# Patient Record
Sex: FEMALE | Race: WHITE | NOT HISPANIC OR LATINO | Employment: FULL TIME | URBAN - METROPOLITAN AREA
[De-identification: names, ages, dates, MRNs, and addresses within clinical notes are randomized per-mention and may not be internally consistent; named-entity substitution may affect disease eponyms.]

---

## 2024-10-24 ENCOUNTER — HOSPITAL ENCOUNTER (EMERGENCY)
Facility: HOSPITAL | Age: 24
Discharge: HOME | End: 2024-10-24
Attending: EMERGENCY MEDICINE
Payer: COMMERCIAL

## 2024-10-24 ENCOUNTER — APPOINTMENT (OUTPATIENT)
Dept: CARDIOLOGY | Facility: HOSPITAL | Age: 24
End: 2024-10-24
Payer: COMMERCIAL

## 2024-10-24 ENCOUNTER — APPOINTMENT (OUTPATIENT)
Dept: RADIOLOGY | Facility: HOSPITAL | Age: 24
End: 2024-10-24
Payer: COMMERCIAL

## 2024-10-24 VITALS
HEART RATE: 95 BPM | RESPIRATION RATE: 21 BRPM | DIASTOLIC BLOOD PRESSURE: 84 MMHG | BODY MASS INDEX: 20.81 KG/M2 | SYSTOLIC BLOOD PRESSURE: 113 MMHG | HEIGHT: 60 IN | OXYGEN SATURATION: 95 % | TEMPERATURE: 97.3 F | WEIGHT: 106 LBS

## 2024-10-24 DIAGNOSIS — R07.9 CHEST PAIN, UNSPECIFIED TYPE: Primary | ICD-10-CM

## 2024-10-24 LAB
ALBUMIN SERPL BCP-MCNC: 4.5 G/DL (ref 3.4–5)
ALP SERPL-CCNC: 42 U/L (ref 33–110)
ALT SERPL W P-5'-P-CCNC: 8 U/L (ref 7–45)
ANION GAP SERPL CALC-SCNC: 16 MMOL/L
AST SERPL W P-5'-P-CCNC: 20 U/L (ref 9–39)
ATRIAL RATE: 102 BPM
BASOPHILS # BLD AUTO: 0.04 X10*3/UL (ref 0–0.1)
BASOPHILS NFR BLD AUTO: 0.5 %
BILIRUB SERPL-MCNC: 0.6 MG/DL (ref 0–1.2)
BUN SERPL-MCNC: 10 MG/DL (ref 6–23)
CALCIUM SERPL-MCNC: 8.9 MG/DL (ref 8.6–10.3)
CARDIAC TROPONIN I PNL SERPL HS: <3 NG/L (ref 0–13)
CARDIAC TROPONIN I PNL SERPL HS: <3 NG/L (ref 0–13)
CHLORIDE SERPL-SCNC: 100 MMOL/L (ref 98–107)
CO2 SERPL-SCNC: 23 MMOL/L (ref 21–32)
CREAT SERPL-MCNC: 0.59 MG/DL (ref 0.5–1.05)
D DIMER PPP FEU-MCNC: <215 NG/ML FEU
EGFRCR SERPLBLD CKD-EPI 2021: >90 ML/MIN/1.73M*2
EOSINOPHIL # BLD AUTO: 0.04 X10*3/UL (ref 0–0.7)
EOSINOPHIL NFR BLD AUTO: 0.5 %
ERYTHROCYTE [DISTWIDTH] IN BLOOD BY AUTOMATED COUNT: 11.8 % (ref 11.5–14.5)
GLUCOSE SERPL-MCNC: 74 MG/DL (ref 74–99)
HCT VFR BLD AUTO: 39.3 % (ref 36–46)
HGB BLD-MCNC: 13.1 G/DL (ref 12–16)
IMM GRANULOCYTES # BLD AUTO: 0.04 X10*3/UL (ref 0–0.7)
IMM GRANULOCYTES NFR BLD AUTO: 0.5 % (ref 0–0.9)
LYMPHOCYTES # BLD AUTO: 2.14 X10*3/UL (ref 1.2–4.8)
LYMPHOCYTES NFR BLD AUTO: 24.3 %
MCH RBC QN AUTO: 29.7 PG (ref 26–34)
MCHC RBC AUTO-ENTMCNC: 33.3 G/DL (ref 32–36)
MCV RBC AUTO: 89 FL (ref 80–100)
MONOCYTES # BLD AUTO: 0.36 X10*3/UL (ref 0.1–1)
MONOCYTES NFR BLD AUTO: 4.1 %
NEUTROPHILS # BLD AUTO: 6.19 X10*3/UL (ref 1.2–7.7)
NEUTROPHILS NFR BLD AUTO: 70.1 %
NRBC BLD-RTO: 0 /100 WBCS (ref 0–0)
P AXIS: 75 DEGREES
P OFFSET: 206 MS
P ONSET: 159 MS
PLATELET # BLD AUTO: 362 X10*3/UL (ref 150–450)
POTASSIUM SERPL-SCNC: 3.7 MMOL/L (ref 3.5–5.3)
PR INTERVAL: 124 MS
PROT SERPL-MCNC: 7.7 G/DL (ref 6.4–8.2)
Q ONSET: 221 MS
QRS COUNT: 16 BEATS
QRS DURATION: 76 MS
QT INTERVAL: 338 MS
QTC CALCULATION(BAZETT): 440 MS
QTC FREDERICIA: 403 MS
R AXIS: 88 DEGREES
RBC # BLD AUTO: 4.41 X10*6/UL (ref 4–5.2)
SODIUM SERPL-SCNC: 135 MMOL/L (ref 136–145)
T AXIS: 18 DEGREES
T OFFSET: 390 MS
VENTRICULAR RATE: 102 BPM
WBC # BLD AUTO: 8.8 X10*3/UL (ref 4.4–11.3)

## 2024-10-24 PROCEDURE — 36415 COLL VENOUS BLD VENIPUNCTURE: CPT

## 2024-10-24 PROCEDURE — 80053 COMPREHEN METABOLIC PANEL: CPT

## 2024-10-24 PROCEDURE — 85379 FIBRIN DEGRADATION QUANT: CPT

## 2024-10-24 PROCEDURE — 93005 ELECTROCARDIOGRAM TRACING: CPT

## 2024-10-24 PROCEDURE — 84484 ASSAY OF TROPONIN QUANT: CPT

## 2024-10-24 PROCEDURE — 99283 EMERGENCY DEPT VISIT LOW MDM: CPT | Mod: 25

## 2024-10-24 PROCEDURE — 71046 X-RAY EXAM CHEST 2 VIEWS: CPT

## 2024-10-24 PROCEDURE — 71046 X-RAY EXAM CHEST 2 VIEWS: CPT | Performed by: RADIOLOGY

## 2024-10-24 PROCEDURE — 85025 COMPLETE CBC W/AUTO DIFF WBC: CPT

## 2024-10-24 ASSESSMENT — COLUMBIA-SUICIDE SEVERITY RATING SCALE - C-SSRS
2. HAVE YOU ACTUALLY HAD ANY THOUGHTS OF KILLING YOURSELF?: NO
1. IN THE PAST MONTH, HAVE YOU WISHED YOU WERE DEAD OR WISHED YOU COULD GO TO SLEEP AND NOT WAKE UP?: NO
6. HAVE YOU EVER DONE ANYTHING, STARTED TO DO ANYTHING, OR PREPARED TO DO ANYTHING TO END YOUR LIFE?: NO

## 2024-10-24 ASSESSMENT — PAIN DESCRIPTION - DESCRIPTORS: DESCRIPTORS: HEAVINESS

## 2024-10-24 ASSESSMENT — PAIN - FUNCTIONAL ASSESSMENT: PAIN_FUNCTIONAL_ASSESSMENT: 0-10

## 2024-10-24 ASSESSMENT — PAIN SCALES - GENERAL: PAINLEVEL_OUTOF10: 7

## 2024-10-24 NOTE — Clinical Note
Essence Almodovar was seen and treated in our emergency department on 10/24/2024.  She may return to work on 10/25/2024.       If you have any questions or concerns, please don't hesitate to call.      Tony Carroll, DO

## 2024-10-24 NOTE — DISCHARGE INSTRUCTIONS
You have been diagnosed with chest pain in the emergency department today.  Your labs and imaging did not show any concerning findings today and you will be discharged home.  You can take Tylenol and ibuprofen as needed for your pain.  A referral for primary care has been placed and you can expect a call from them in the next few days to set up an appointment.  Please return to the emergency department if you begin experiencing any severe crushing chest pain, shortness of breath on exertion, passout, severe/sudden onset headache, or strokelike symptoms.

## 2024-10-24 NOTE — ED PROVIDER NOTES
History of Present Illness   History provided by: Patient  Limitations to History: None  External Records Reviewed with Brief Summary: Outpatient progress note from 10/23/2024 which showed workup for palpitations and near syncopal episode with nausea where she was prescribed Zofran.    HPI:  Essence Almodovar is a 24 y.o. female with with a past medical history of SLE that presents emerged apartment for chest pain.  The patient states that this pain is located in the center of her chest and does not radiate anywhere else.  She describes the pain as pressure in nature with occasional bouts of sharpness but denies any ripping/tearing pain.  She does state that the pain is worse when she stands for prolonged periods of time but is not worse with exertion.  She does complain of some mild nausea with no vomiting.  2 days ago she did have an episode where she became flushed and had some associated dizziness and near syncope but the symptoms have completely resolved and have not recurred.  She does complain of some mild neck pain that is worse with changes in position but denies any associated shortness of breath, fevers, chills, cough, hemoptysis, lower extremity pain/swelling, abdominal pain, focal weakness, numbness/tingling, vision changes, loss of bowel or bladder dysfunction or any other concerning symptoms.  The patient is currently on birth control.  She denies any chance of being pregnant as she has not engaged in sexual activity yet.    Physical Exam   Triage vitals:  T 36.3 °C (97.3 °F)  HR (!) 107  /87  RR 16  O2 97 %      Vital signs reviewed in nursing triage note, EMR flow sheets, and at patient's bedside.   General: Awake, alert, in no acute distress  Eyes: Gaze conjugate.  No scleral icterus or injection  HENT: Normo-cephalic, atraumatic. No stridor. No rhinorrhea or epistaxis.  CV: Tachycardic with regular rhythm. No murmurs appreciated.  2+ radial and DP pulses bilaterally.  Respiratory: Breathing  non-labored, speaking in full sentences.  Lungs clear to auscultation bilaterally.  GI: Soft, non-distended, non-tender. No rebound or guarding.  MSK/Extremities: No gross bony deformities. Moving all extremities. No lower extremity edema.  Skin: Warm. Appropriate color  Neuro: Alert. Oriented. Face symmetric. Speech is fluent.  Gross strength and sensation intact in b/l UE and LEs  Psych: Appropriate mood and affect      Medical Decision Making & ED Course   Medical Decision Makin y.o. female with the above-stated past medical history that presents to the emergency department for chest pain.  Upon arrival to the emergency department the patient was tachycardic with a heart rate of 107 but had otherwise stable vital signs, was afebrile and saturating well on room air.  History and physical exam are as above but notable for nontoxic-appearing patient in no acute distress.  Exam otherwise is grossly unremarkable.  Patient had normal and equal pulses in all 4 extremities and a nonfocal neurologic exam and signs and symptoms are not consistent with aortic dissection as this is extremely unlikely in a patient this age without significant comorbidities.  There is concern for pulmonary embolism as the patient is on birth control and does have a history of lupus and will obtain D-dimer to rule out this pathology.  Appropriate chest pain workup has been ordered including EKG, chest x-ray and troponins x 2.  EKG was without evidence of ischemia.  Chest x-ray was grossly unremarkable.  Point-of-care ultrasound was personally performed and interpreted by me showing no evidence of pericardial effusion, depressed cardiac function, right heart strain or other concerning findings.  Given the overall negative workup, the patient is appropriate for discharge home.  See ED course below for further discharge details.    Going to use: Heart score 1    Differential diagnoses considered include but are not limited to: ACS, PE,  pneumonia, pericardial effusion, pneumothorax, reflux disease, anxiety, costochondritis, aortic dissection    ED Course:  ED Course as of 10/24/24 2203   Thu Oct 24, 2024   1035 EKG personally interpreted by me showing sinus tachycardia at a rate of 102 bpm with normal axis.  Intervals are within normal limits.  There is mild ST depression appreciated in V5 with no contiguous changes.  T wave inversions noted in the anterior and inferior leads.  There is no prior EKGs for comparison.  No GARCÍA. [RS]   1248 D-Dimer, VTE Exclusion  D-dimer negative.  Repeat troponin pending at this time. [RS]   1319 Troponin I Series, High Sensitivity (0, 1 HR)  Repeat troponin negative.  The patient is appropriate for discharge at this time and was provided with a prescription for Tylenol and ibuprofen which was sent to their pharmacy.  Strict return precautions were provided including  severe/crushing chest pain, shortness of breath on exertion, syncope, severe/sudden onset headaches or strokelike symptoms.  The patient verbalized their understanding of this and was discharged in stable condition.  Instructions to follow-up with their primary care provider within 5 days for reevaluation of her symptoms were provided. [RS]      ED Course User Index  [RS] Tony Carroll,          Diagnoses as of 10/24/24 2203   Chest pain, unspecified type        Social Determinants of Health which Significantly Impact Care: None identified     EKG Independent Interpretation: EKG interpreted by myself. Please see ED Course for full interpretation.    Independent Result Review and Interpretation: Relevant laboratory and radiographic results were reviewed and independently interpreted by myself.  As necessary, they are commented on in the ED Course.    Chronic conditions affecting the patient's care: As documented in the HPI    The patient was discussed with the following consultants/services: None    Care Considerations: As documented above in  MDM    Disposition   As a result of the work-up, the patient was discharged home.  she was informed of her diagnosis and instructed to come back with any concerns or worsening of condition.  she and was agreeable to the plan as discussed above.  she was given the opportunity to ask questions.  All of the patient's questions were answered.    Procedures   Procedures    Patient seen and discussed with ED attending physician.    Tony Carroll DO   Emergency Medicine, PGY-2     Disclaimer: This note was dictated by speech recognition. Minor errors in transcription may be present.     [unfilled] ? SmartLinks last updated 10/24/2024 10:41 AM        Tony Carroll DO  Resident  10/24/24 [id number]

## 2024-10-25 NOTE — ED PROCEDURE NOTE
Procedure    Performed by: Tony Carroll DO  Authorized by: Derian Newton MD  Cardiac Indications: chest pain                Procedure: Cardiac Ultrasound    Findings:   Views: parasternal long, parasternal short and apical four  The pericardial space was visualized and was NEGATIVE for a significant pericardial effusion.  Activity: Ventricular contractions were visualized.  LV: LV systolic function was NORMAL.  RV: RV size was NORMAL.    Impression:  Cardiac: The focused cardiac ultrasound exam was NORMAL.                   Tony Carroll DO  Resident  10/24/24 7559

## 2024-10-31 ENCOUNTER — APPOINTMENT (OUTPATIENT)
Dept: PRIMARY CARE | Facility: CLINIC | Age: 24
End: 2024-10-31
Payer: COMMERCIAL

## 2024-10-31 VITALS
BODY MASS INDEX: 20.81 KG/M2 | DIASTOLIC BLOOD PRESSURE: 72 MMHG | HEART RATE: 82 BPM | SYSTOLIC BLOOD PRESSURE: 124 MMHG | RESPIRATION RATE: 17 BRPM | HEIGHT: 60 IN | WEIGHT: 106 LBS

## 2024-10-31 DIAGNOSIS — R07.9 CHEST PAIN, UNSPECIFIED TYPE: Primary | ICD-10-CM

## 2024-10-31 DIAGNOSIS — M32.9 SYSTEMIC LUPUS ERYTHEMATOSUS, UNSPECIFIED SLE TYPE, UNSPECIFIED ORGAN INVOLVEMENT STATUS (MULTI): ICD-10-CM

## 2024-10-31 DIAGNOSIS — R94.31 ABNORMAL EKG: ICD-10-CM

## 2024-10-31 PROBLEM — I49.9 IRREGULAR HEART BEAT: Status: RESOLVED | Noted: 2020-09-02 | Resolved: 2024-10-31

## 2024-10-31 PROBLEM — F90.9 ADHD: Status: RESOLVED | Noted: 2020-09-02 | Resolved: 2024-10-31

## 2024-10-31 LAB
ATRIAL RATE: 102 BPM
P AXIS: 75 DEGREES
P OFFSET: 206 MS
P ONSET: 159 MS
PR INTERVAL: 124 MS
Q ONSET: 221 MS
QRS COUNT: 16 BEATS
QRS DURATION: 76 MS
QT INTERVAL: 338 MS
QTC CALCULATION(BAZETT): 440 MS
QTC FREDERICIA: 403 MS
R AXIS: 88 DEGREES
T AXIS: 18 DEGREES
T OFFSET: 390 MS
VENTRICULAR RATE: 102 BPM

## 2024-10-31 PROCEDURE — 99204 OFFICE O/P NEW MOD 45 MIN: CPT | Performed by: NURSE PRACTITIONER

## 2024-10-31 PROCEDURE — 3008F BODY MASS INDEX DOCD: CPT | Performed by: NURSE PRACTITIONER

## 2024-10-31 PROCEDURE — 1036F TOBACCO NON-USER: CPT | Performed by: NURSE PRACTITIONER

## 2024-10-31 RX ORDER — ONDANSETRON 4 MG/1
4 TABLET, ORALLY DISINTEGRATING ORAL EVERY 8 HOURS PRN
COMMUNITY
Start: 2024-10-23

## 2024-10-31 RX ORDER — NORELGESTROMIN AND ETHINYL ESTRADIOL 35; 150 UG/D; UG/D
1 PATCH TRANSDERMAL
COMMUNITY
Start: 2024-10-26

## 2024-10-31 RX ORDER — HYDROXYCHLOROQUINE SULFATE 200 MG/1
1 TABLET, FILM COATED ORAL DAILY
COMMUNITY

## 2024-10-31 ASSESSMENT — ENCOUNTER SYMPTOMS
TREMORS: 1
FATIGUE: 1
WHEEZING: 0
FEVER: 0
PALPITATIONS: 1
COUGH: 0
NAUSEA: 1
VOMITING: 0
NERVOUS/ANXIOUS: 0
CHILLS: 0
SHORTNESS OF BREATH: 0

## 2024-11-01 ENCOUNTER — TELEPHONE (OUTPATIENT)
Dept: CARDIOLOGY | Facility: CLINIC | Age: 24
End: 2024-11-01
Payer: COMMERCIAL

## 2024-11-01 NOTE — TELEPHONE ENCOUNTER
Rec'v a voice message from Janelle Fry CNP's office requesting a New Patient Visit for Chest Pain.  They requested we call the patient directly.    I called Essence, had to leave a detailed vm.  I asked that Essence call us back to schedule.    We will try calling her again later.  ---ssd.

## 2024-11-12 ENCOUNTER — HOSPITAL ENCOUNTER (EMERGENCY)
Facility: HOSPITAL | Age: 24
Discharge: LEFT BEFORE DISCHARGE | End: 2024-11-12
Attending: INTERNAL MEDICINE
Payer: COMMERCIAL

## 2024-11-12 VITALS
SYSTOLIC BLOOD PRESSURE: 114 MMHG | WEIGHT: 106 LBS | TEMPERATURE: 99.1 F | HEART RATE: 87 BPM | BODY MASS INDEX: 20.81 KG/M2 | OXYGEN SATURATION: 98 % | DIASTOLIC BLOOD PRESSURE: 87 MMHG | HEIGHT: 60 IN | RESPIRATION RATE: 18 BRPM

## 2024-11-12 DIAGNOSIS — R00.2 PALPITATIONS: Primary | ICD-10-CM

## 2024-11-12 DIAGNOSIS — R07.9 CHEST PAIN, UNSPECIFIED TYPE: ICD-10-CM

## 2024-11-12 DIAGNOSIS — R42 DIZZINESS: ICD-10-CM

## 2024-11-12 PROCEDURE — 99281 EMR DPT VST MAYX REQ PHY/QHP: CPT

## 2024-11-12 ASSESSMENT — COLUMBIA-SUICIDE SEVERITY RATING SCALE - C-SSRS
2. HAVE YOU ACTUALLY HAD ANY THOUGHTS OF KILLING YOURSELF?: NO
6. HAVE YOU EVER DONE ANYTHING, STARTED TO DO ANYTHING, OR PREPARED TO DO ANYTHING TO END YOUR LIFE?: NO
1. IN THE PAST MONTH, HAVE YOU WISHED YOU WERE DEAD OR WISHED YOU COULD GO TO SLEEP AND NOT WAKE UP?: NO

## 2024-11-12 ASSESSMENT — PAIN SCALES - GENERAL: PAINLEVEL_OUTOF10: 0 - NO PAIN

## 2024-11-12 ASSESSMENT — PAIN - FUNCTIONAL ASSESSMENT: PAIN_FUNCTIONAL_ASSESSMENT: 0-10

## 2024-11-12 NOTE — ED PROVIDER NOTES
"HPI     CC: Dizziness and Nausea     HPI: Essence Almodovar is a 24 y.o. female with a history of SLE on Plaquenil who presents with 2 weeks of intermittent chest pain, dizziness, and nausea.  She has been having intermittent spells of dizziness, palpitations, substernal chest pain, shakiness, and nausea.  It happened suddenly, without provocation, lasts up to 30 minutes at a time.  She describes the dizziness as both a lightheaded and \"room rocking\" sensation.  She intermittently has some shortness of breath associated with this.  A few days ago at work she lost consciousness briefly.  She denies leg pain or swelling.  She is on birth control.  She was seen for similar symptoms in the ED 2 weeks ago, had a workup that included labs, ECG, point-of-care echo and D-dimer which were reportedly negative.  Her p.o. intake has been good.  She is not on any new medications.  She states that she has a history of low blood pressure but her blood pressure has been running high during his episodes.    ROS: 10-point review of systems was performed and is otherwise negative except as noted in HPI.    Limitations to history: N/A    Independent Historians: N/A    External Records Reviewed: Outpatient notes in EMR    Past Medical History: Noncontributory except per HPI     Past Surgical History: Noncontributory except per HPI     Family History: Reviewed and noncontributory     Social History:  Denies tobacco. Denies ETOH. Denies illicit drugs.    Social Determinants Affecting Care: N/A    Allergies   Allergen Reactions    Pineapple Anaphylaxis, Unknown and Swelling    Amoxicillin Hives       Home Meds:   Current Outpatient Medications   Medication Instructions    hydroxychloroquine (Plaquenil) 200 mg tablet 1 tablet, Daily    ondansetron ODT (ZOFRAN-ODT) 4 mg, Every 8 hours PRN    Zafemy 150-35 mcg/24 hr 1 patch        Physical Exam     ED Triage Vitals [11/12/24 1200]   Temperature Heart Rate Respirations BP   37.3 °C (99.1 °F) 88 " 18 115/79      Pulse Ox Temp Source Heart Rate Source Patient Position   100 % Temporal Monitor Sitting      BP Location FiO2 (%)     Left arm --         Heart Rate:  [88]   Temperature:  [37.3 °C (99.1 °F)]   Respirations:  [18]   BP: (115)/(79)   Height:  [152.4 cm (5')]   Weight:  [48.1 kg (106 lb)]   Pulse Ox:  [100 %]      Physical Exam  Vitals and nursing note reviewed.     CONSTITUTIONAL: Well appearing, well nourished, in no acute distress.   HENMT: Head atraumatic. Airway patent. Nasal mucosa clear. Mouth with normal mucosa, clear oropharynx. Uvula midline. Neck supple.    EYES: Clear bilaterally, pupils equally round and reactive to light.   CARDIOVASCULAR: Normal rate, regular rhythm.  Heart sounds S1, S2.  No murmurs, rubs or gallops. Normal pulses. Capillary refill < 2 sec.   RESPIRATORY: No increased work of breathing. Breath sounds clear and equal bilaterally.  GASTROINTESTINAL: Abdomen soft, non-distended, non-tender. No rebound, no guarding. Normal bowel sounds. No palpable masses.  GENITOURINARY:  No CVA tenderness.  MUSCULOSKELETAL: Spine appears normal, range of motion is not limited, no muscle or joint tenderness. No edema.   NEUROLOGICAL: Alert and oriented, no asymmetry, moving all extremities equally.  SKIN: Warm, dry and intact. No rash or notable lesions.  PSYCHIATRIC: Normal mood and affect.  HEME/LYMPH: No adenopathy or splenomegaly.    Diagnostic Results      ECG: ECGs read and interpreted by me. See ED Course, below, for interpretation.    Labs Reviewed - No data to display      No orders to display                 No data recorded                Procedure  Procedures    ED Course & MDM   Assessment/Plan:   Essence Almodovar is a 24 y.o. female with a history of SLE on Plaquenil who presents with 2 weeks of intermittent isolated episodes of chest pain, dizziness, and nausea over the past 2 weeks with some associated shortness of breath and a syncopal event a few days ago.  She is  hemodynamically stable and well-appearing with an unremarkable exam.  ECG shows nonspecific T wave changes unchanged from prior.  Differential is broad, includes arrhythmia, orthostasis, vasovagal syncope, PE, less likely ACS.  I recommended repeating labs including a D-dimer and serial troponins as well as an x-ray.  She is not interested in repeating the workup from 2 weeks ago and actually wants to leave AGAINST MEDICAL ADVICE and return to work.  Patient was discharged home with anticipatory guidance and strict return precautions.    Medications - No data to display     ED Course as of 11/13/24 2037 Tue Nov 12, 2024   1345 ECG read interpreted by me.  Normal sinus rhythm, rate 96.  Rightward axis.  Normal intervals.  Isolated T wave inversion V3 and V4.  Unchanged from prior. [CG]      ED Course User Index  [CG] Marina Mendoza MD         Diagnoses as of 11/13/24 2037   Palpitations   Chest pain, unspecified type   Dizziness       Disposition:   Left against medical advice.. In my opinion, the patient has capacity to leave AMA. The patient is clinically sober, free from distracting injury, appears to have intact insight and judgment and reason, and in my opinion has capacity to make decisions. I explained to the patient that these symptoms may represent a serious underlying medical condition  and the patient verbalized understanding of my concerns and understands the consequences of leaving without complete evaluation.    I had a discussion with the patient about their workup and results, and informed the patient what the next step in diagnosis and treatment would be, and they verbalized understanding. I explained the risks of leaving without further workup or treatment, which included reasonably foreseeable complications such as death, serious injury, and permanent disability. I also offered alternatives to departing AMA such as assigning the patient a different provider or an alternate workup  pathway.    I have asked them to return as soon as possible to complete their evaluation, and also explained that they are welcome to return to the ED for further evaluation whenever they choose. I have asked the patient to follow up with their primary doctor as soon as possible. All of their questions were answered and they were given both oral and written discharge instructions.     ED Prescriptions    None         Marina Mendoza MD  EM/IM/Peds    This note was dictated by speech recognition. Minor errors in transcription may be present.     Marina Mendoza MD  11/13/24 2041

## 2024-11-12 NOTE — DISCHARGE INSTRUCTIONS
You were seen today for episodic dizziness, nausea, and palpitations.  You have opted to leave AGAINST MEDICAL ADVICE.  Keep your appointment with cardiology next week.  You may benefit from a Holter monitor.  Please see the attached information sheet for information about your condition, how to care for your condition at home, and reasons to return to the emergency department. Take any prescriptions written today as prescribed. You should call your primary care provider within 24 hours to tell them about today's visit, including any new medications or medication changes, as he or she may want to see you in the office for further evaluation. If you do not have a primary care provider, call  [phone] for an appointment. We offer in-person office visits as well as virtual options. Please do not hesitate to call  [phone] or return to the emergency department with any new or unresolved concerns or symptoms. Thank you for choosing [facility] for your care.

## 2024-11-18 ENCOUNTER — APPOINTMENT (OUTPATIENT)
Dept: CARDIOLOGY | Facility: CLINIC | Age: 24
End: 2024-11-18
Payer: COMMERCIAL

## 2024-11-18 VITALS
HEART RATE: 82 BPM | HEIGHT: 60 IN | DIASTOLIC BLOOD PRESSURE: 68 MMHG | BODY MASS INDEX: 21.2 KG/M2 | SYSTOLIC BLOOD PRESSURE: 110 MMHG | WEIGHT: 108 LBS

## 2024-11-18 DIAGNOSIS — R07.89 ATYPICAL CHEST PAIN: ICD-10-CM

## 2024-11-18 DIAGNOSIS — M32.19 OTHER SYSTEMIC LUPUS ERYTHEMATOSUS WITH OTHER ORGAN INVOLVEMENT: ICD-10-CM

## 2024-11-18 DIAGNOSIS — R55 SYNCOPE AND COLLAPSE: Primary | ICD-10-CM

## 2024-11-18 DIAGNOSIS — R07.9 CHEST PAIN, UNSPECIFIED TYPE: ICD-10-CM

## 2024-11-18 DIAGNOSIS — R00.2 PALPITATIONS: ICD-10-CM

## 2024-11-18 PROCEDURE — 1036F TOBACCO NON-USER: CPT | Performed by: INTERNAL MEDICINE

## 2024-11-18 PROCEDURE — 3008F BODY MASS INDEX DOCD: CPT | Performed by: INTERNAL MEDICINE

## 2024-11-18 PROCEDURE — 99204 OFFICE O/P NEW MOD 45 MIN: CPT | Performed by: INTERNAL MEDICINE

## 2024-11-18 PROCEDURE — 93000 ELECTROCARDIOGRAM COMPLETE: CPT | Performed by: INTERNAL MEDICINE

## 2024-11-18 RX ORDER — DIPHENHYDRAMINE HCL 25 MG
25 TABLET ORAL DAILY PRN
COMMUNITY

## 2024-11-18 RX ORDER — PSEUDOEPHEDRINE HCL 120 MG/1
120 TABLET, FILM COATED, EXTENDED RELEASE ORAL EVERY 12 HOURS PRN
COMMUNITY

## 2024-11-18 NOTE — PROGRESS NOTES
Referred by Dr. Wang ref. provider found for Please see below.     History Of Present Illness:    Essence Almodovar is a 24 y.o. 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  In the ER she had a variety of labs including D-dimer and troponins, and CXR, and EKGs.  She was released.  During the November 12, 2024 ER visit, she left AGAINST MEDICAL ADVICE.    The aforementioned symptoms occurred randomly at work.  On one occasion, the chest discomfort, palpitations, dizziness, and feeling hot occurred after she ate.  On the second occasion, she was taking to a patient.  She also had a feeling like an elephant sitting on her chest, with duration being about 30 minutes.  The chest discomfort was not pleuritic in nature.    She runs or does an aerobic dance activity for 30 minutes a day, five days a week.  Usually she feels well, but sometimes gets dizzy and nauseated during exercise as well.      She drinks about 64 oz of water per day.  She rarely consumes caffeine or alcohol.  She does not use recreational drugs.    She is taking Allegra and pseudoephedrine over-the-counter for allergy symptoms.      PMH: lupus (malar rash, fatigue, arthralgias), seasonal allergies (on Allegra, and pseudoephedrine)      Past Medical History:  She has a past medical history of ADHD (09/02/2020), ADHD (attention deficit hyperactivity disorder), Allergic, Eating disorder, Eczema, Headache, Irregular heart beat (09/02/2020), Scoliosis, and Visual impairment.    Past Surgical History:  She has a past surgical history that includes [place] tooth extraction.      Social History:  She reports that she has never smoked. She has never used smokeless tobacco. She reports current alcohol use.  "She reports that she does not use drugs.    Family History:  Family History   Problem Relation Name Age of Onset    No Known Problems Mother      No Known Problems Father          Allergies:  Pineapple and Amoxicillin    Outpatient Medications:  Current Outpatient Medications   Medication Instructions    diphenhydrAMINE (ALLERGY) 25 mg, Daily PRN    hydroxychloroquine (Plaquenil) 200 mg tablet 1 tablet, Daily    ondansetron ODT (ZOFRAN-ODT) 4 mg, Every 8 hours PRN    pseudoephedrine ER (SUDAFED-12 HOUR) 120 mg, Every 12 hours PRN    Zafemy 150-35 mcg/24 hr 1 patch        Last Recorded Vitals:  Vitals:    11/18/24 1300   BP: 110/68   BP Location: Left arm   Patient Position: Sitting   Pulse: 82   Weight: 49 kg (108 lb)   Height: 1.524 m (5')       Physical Exam:  GENERAL:  pleasant 24 year-old  HEENT: No xanthelasma  NECK: Supple, no palpable adenopathy or thyromegaly  CHEST: Clear to auscultation, respiratory effort unlabored; her chest discomfort is reproducible with palpation in the midsternal region.  CARDIAC: RRR, normal S1 and S2, no audible murmur, rub, gallop, carotids are brisk, PMI is not displaced  ABD: Active bowel sounds, nontender, no organomegaly, no evidence of ascites  EXT: No clubbing, cyanosis, edema, or tenderness  NEURO: Awake, alert, appropriate, speech is fluent         Last Labs:  CBC -  Lab Results   Component Value Date    WBC 8.8 10/24/2024    HGB 13.1 10/24/2024    HCT 39.3 10/24/2024    MCV 89 10/24/2024     10/24/2024       CMP -  Lab Results   Component Value Date    CALCIUM 8.9 10/24/2024    PROT 7.7 10/24/2024    ALBUMIN 4.5 10/24/2024    AST 20 10/24/2024    ALT 8 10/24/2024    ALKPHOS 42 10/24/2024    BILITOT 0.6 10/24/2024       LIPID PANEL -   No results found for: \"CHOL\", \"TRIG\", \"HDL\", \"CHHDL\", \"LDLF\", \"VLDL\", \"NHDL\"    RENAL FUNCTION PANEL -   Lab Results   Component Value Date    GLUCOSE 74 10/24/2024     (L) 10/24/2024    K 3.7 10/24/2024     10/24/2024    " "CO2 23 10/24/2024    ANIONGAP 16 10/24/2024    BUN 10 10/24/2024    CREATININE 0.59 10/24/2024    CALCIUM 8.9 10/24/2024    ALBUMIN 4.5 10/24/2024        No results found for: \"BNP\", \"HGBA1C\"      Lab review: I have Chemistry CMP:   Lab Results   Component Value Date    ALBUMIN 4.5 10/24/2024    CALCIUM 8.9 10/24/2024    CO2 23 10/24/2024    CREATININE 0.59 10/24/2024    GLUCOSE 74 10/24/2024    BILITOT 0.6 10/24/2024    PROT 7.7 10/24/2024    ALT 8 10/24/2024    AST 20 10/24/2024    ALKPHOS 42 10/24/2024   , Chemistry BMP   Lab Results   Component Value Date    GLUCOSE 74 10/24/2024    CALCIUM 8.9 10/24/2024    CO2 23 10/24/2024    CREATININE 0.59 10/24/2024   , CBC:  Lab Results   Component Value Date    WBC 8.8 10/24/2024    RBC 4.41 10/24/2024    HGB 13.1 10/24/2024    HCT 39.3 10/24/2024    MCV 89 10/24/2024    MCH 29.7 10/24/2024    MCHC 33.3 10/24/2024    RDW 11.8 10/24/2024    NRBC 0.0 10/24/2024   , and Cardiac Enzymes: No results found for: \"CKTOTAL\", \"CKMB\", \"CKMBINDEX\", \"TROPONINT\"  Diagnostic review: I have independently interpreted the EKG .  My findings are as summarized in the reports.    Assessment/Plan   Assessment & Plan  Chest pain, unspecified type  Symptoms of chest pain sound atypical, and are reproducible on exam.  Because of her history of lupus, the issue of pericardial disease is raised.  An echocardiogram will help us address this.  Orders:    ECG 12 lead (Clinic Performed)    Syncope and collapse  The episodes of syncope sound somewhat vasovagal in origin, or may be related to POTS.  I advised her to increase her intake of noncaffeinated, nonalcoholic beverages to at least 100-120 ounces daily.      I advised the patient that  the following should be avoided: driving, operating power equipment, climbing ladders, and situations where if the patient were to pass out, there could be potential harm to the patient or bystanders.     I advised the patient to contact me should the patient " develop persistent symptoms after making these lifestyle modifications.  The patient understands and agrees.    Orders:    Holter Or Event Cardiac Monitor; Future    Follow Up In Cardiology; Future    Atypical chest pain  As above, echocardiogram will be done to assess for possible pericardial effusion in light of her history of lupus.  Orders:    Transthoracic Echo Complete; Future    Follow Up In Cardiology; Future    Other systemic lupus erythematosus with other organ involvement    Orders:    Transthoracic Echo Complete; Future    Palpitations  Advised her to stop using pseudoephedrine and Allegra and other antihistamines.               Israel Silva MD

## 2024-11-18 NOTE — PATIENT INSTRUCTIONS
"It was my pleasure to meet you.  I look forward to being your cardiologist.  I am a huge believer in communicating with my patients.  Please contact me at any time, if anything is not clear to you regarding anything we have discussed, or if new questions occur to you.     You should increase your intake of fresh fruits and vegetables.  Try to consume 9-12 servings per day of such foods.  You should increase your intake of deep sea fish such as salmon and tuna.  Try to get two servings per week of fish, but if you are a pregnant woman, talk to your obstetrician before increasing your fish intake.  You should increase your intake of unprocessed nuts such as walnuts or almonds.  Increase your intake of plant-based protein.  You should avoid fried foods.  Don't consume sugary or starchy foods and sugary drinks.  Avoid saturated fats.  Try not to dine at restaurants more than once per month, and don't dine at fast food places.  Try to get 7-9 hours of sleep every night.  Try to get 150 minutes per week of moderate intensity exercise (after I have cleared you to start an exercise program).  Try to maintain the appropriate weight for your height based on body mass index (BMI). Maintain your cholesterol, blood sugar, and blood pressure in the recommended respective normal ranges.  There is a wealth of information on the American Heart Association's website regarding this.  Just Google \"Life's Essential 8\" for more information.   Ask me about any of these details  if you have questions.    As your cardiologist, I will be available to you at any time to answer any question you have concerning your heart health.  My staff, Juan can also answer any questions you may have.  Best of luck.       It is important for us to have an accurate list of the medications, supplements, and their doses.  It is also important for us to have an accurate list of your allergies.  Please bring this information to every appointment.  " This is a vital part of the quality of care you receive through all of your providers.

## 2024-11-18 NOTE — ASSESSMENT & PLAN NOTE
As above, echocardiogram will be done to assess for possible pericardial effusion in light of her history of lupus.  Orders:    Transthoracic Echo Complete; Future    Follow Up In Cardiology; Future

## 2024-11-18 NOTE — ASSESSMENT & PLAN NOTE
Symptoms of chest pain sound atypical, and are reproducible on exam.  Because of her history of lupus, the issue of pericardial disease is raised.  An echocardiogram will help us address this.  Orders:    ECG 12 lead (Clinic Performed)

## 2024-11-20 ENCOUNTER — HOSPITAL ENCOUNTER (OUTPATIENT)
Dept: CARDIOLOGY | Facility: HOSPITAL | Age: 24
Discharge: HOME | End: 2024-11-20
Payer: COMMERCIAL

## 2024-11-20 DIAGNOSIS — R55 SYNCOPE AND COLLAPSE: ICD-10-CM

## 2024-11-20 PROCEDURE — 93225 XTRNL ECG REC<48 HRS REC: CPT

## 2024-11-27 ENCOUNTER — HOSPITAL ENCOUNTER (OUTPATIENT)
Dept: CARDIOLOGY | Facility: HOSPITAL | Age: 24
Discharge: HOME | End: 2024-11-27
Payer: COMMERCIAL

## 2024-11-27 DIAGNOSIS — R07.9 CHEST PAIN, UNSPECIFIED: ICD-10-CM

## 2024-11-27 DIAGNOSIS — M32.19 OTHER SYSTEMIC LUPUS ERYTHEMATOSUS WITH OTHER ORGAN INVOLVEMENT: ICD-10-CM

## 2024-11-27 DIAGNOSIS — R07.89 ATYPICAL CHEST PAIN: ICD-10-CM

## 2024-11-27 LAB
AORTIC VALVE MEAN GRADIENT: 3 MMHG
AORTIC VALVE PEAK VELOCITY: 1.33 M/S
AV PEAK GRADIENT: 7 MMHG
AVA (PEAK VEL): 2.2 CM2
AVA (VTI): 2.26 CM2
EJECTION FRACTION APICAL 4 CHAMBER: 64.5
EJECTION FRACTION: 67 %
LEFT ATRIUM VOLUME AREA LENGTH INDEX BSA: 30.8 ML/M2
LEFT VENTRICLE INTERNAL DIMENSION DIASTOLE: 4.62 CM (ref 3.5–6)
LEFT VENTRICULAR OUTFLOW TRACT DIAMETER: 1.81 CM
MITRAL VALVE E/A RATIO: 1.57
RIGHT VENTRICLE FREE WALL PEAK S': 17 CM/S
RIGHT VENTRICLE PEAK SYSTOLIC PRESSURE: 18.6 MMHG
TRICUSPID ANNULAR PLANE SYSTOLIC EXCURSION: 2.3 CM

## 2024-11-27 PROCEDURE — 93306 TTE W/DOPPLER COMPLETE: CPT | Performed by: INTERNAL MEDICINE

## 2024-11-27 PROCEDURE — 93306 TTE W/DOPPLER COMPLETE: CPT

## 2024-12-02 ENCOUNTER — APPOINTMENT (OUTPATIENT)
Dept: CARDIOLOGY | Facility: CLINIC | Age: 24
End: 2024-12-02
Payer: COMMERCIAL

## 2024-12-02 VITALS
DIASTOLIC BLOOD PRESSURE: 60 MMHG | BODY MASS INDEX: 21.4 KG/M2 | HEIGHT: 60 IN | WEIGHT: 109 LBS | SYSTOLIC BLOOD PRESSURE: 100 MMHG | HEART RATE: 76 BPM

## 2024-12-02 DIAGNOSIS — G90.A POTS (POSTURAL ORTHOSTATIC TACHYCARDIA SYNDROME): Primary | ICD-10-CM

## 2024-12-02 DIAGNOSIS — R55 SYNCOPE AND COLLAPSE: ICD-10-CM

## 2024-12-02 DIAGNOSIS — R07.89 ATYPICAL CHEST PAIN: ICD-10-CM

## 2024-12-02 PROCEDURE — 1036F TOBACCO NON-USER: CPT | Performed by: INTERNAL MEDICINE

## 2024-12-02 PROCEDURE — 99215 OFFICE O/P EST HI 40 MIN: CPT | Performed by: INTERNAL MEDICINE

## 2024-12-02 PROCEDURE — 3008F BODY MASS INDEX DOCD: CPT | Performed by: INTERNAL MEDICINE

## 2024-12-02 NOTE — PROGRESS NOTES
"Chief Complaint:   Please see below.     History Of Present Illness:    Essence Almodovar is a 24 y.o. female presenting with palpitations, syncope.    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  In the ER she had a variety of labs including D-dimer and troponins, and CXR, and EKGs.  She was released.  During the November 12, 2024 ER visit, she left AGAINST MEDICAL ADVICE.  The aforementioned symptoms occurred randomly at work.  On one occasion, the chest discomfort, palpitations, dizziness, and feeling hot occurred after she ate.  On the second occasion, she was taking to a patient.  She also had a feeling like an elephant sitting on her chest, with duration being about 30 minutes.  The chest discomfort was not pleuritic in nature.    At her previous visit in November 2024, I ordered an echocardiogram in light of her history of lupus to assess for possible pericardial effusion, and a monitor study.  The patient's echocardiogram dated November 27, 2024 revealed an EF of 67%, without significant valvular or pericardial disease.  There is no evidence of pericardial effusion.  The patient's monitor study done November 20-22 2024 disclosed rare PACs, rare PVCs, no sustained pathologic arrhythmia, heart rate ranged from 55-1 52, with an average heart rate of 86.    She continues to experience symptoms of shortness ofr breath, dizziness and chest pain.  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  She drinks 40-60 oz of water daily.  She rarely consumes alcohol, and does not use recreatrional drugs.      She stopped her antihistamine for 10 days, \"and I was miserable, so I started taking it again\".  She has stopped the pseudoephedrine.          Last Recorded Vitals:  Vitals:    12/02/24 1500 "   BP: 100/60   BP Location: Left arm   Patient Position: Sitting   Pulse: 76   Weight: 49.4 kg (109 lb)   Height: 1.524 m (5')       Past Medical History:  She has a past medical history of ADHD (09/02/2020), ADHD (attention deficit hyperactivity disorder), Allergic, Eating disorder, Eczema, Headache, Irregular heart beat (09/02/2020), Scoliosis, and Visual impairment.    Past Surgical History:  She has a past surgical history that includes [place] tooth extraction.      Social History:  She reports that she has never smoked. She has never used smokeless tobacco. She reports current alcohol use. She reports that she does not use drugs.    Family History:  Family History   Problem Relation Name Age of Onset    No Known Problems Mother      No Known Problems Father          Allergies:  Pineapple and Amoxicillin    Outpatient Medications:  Current Outpatient Medications   Medication Instructions    diphenhydrAMINE (ALLERGY) 25 mg, Daily PRN    hydroxychloroquine (Plaquenil) 200 mg tablet 1 tablet, Daily    ondansetron ODT (ZOFRAN-ODT) 4 mg, Every 8 hours PRN    pseudoephedrine ER (SUDAFED-12 HOUR) 120 mg, Every 12 hours PRN    Zafemy 150-35 mcg/24 hr 1 patch       Physical Exam:  GENERAL:  pleasant 24 year-old  HEENT: No xanthelasma  NECK: Supple, no palpable adenopathy or thyromegaly  CHEST: Clear to auscultation, respiratory effort unlabored  CARDIAC: RRR, normal S1 and S2, no audible murmur, rub, gallop, carotids are brisk, PMI is not displaced  ABD: Active bowel sounds, nontender, no organomegaly, no evidence of ascites  EXT: No clubbing, cyanosis, edema, or tenderness  NEURO: Awake, alert, appropriate, speech is fluent       Last Labs:  CBC -  Lab Results   Component Value Date    WBC 8.8 10/24/2024    HGB 13.1 10/24/2024    HCT 39.3 10/24/2024    MCV 89 10/24/2024     10/24/2024       CMP -  Lab Results   Component Value Date    CALCIUM 8.9 10/24/2024    PROT 7.7 10/24/2024    ALBUMIN 4.5 10/24/2024    AST  "20 10/24/2024    ALT 8 10/24/2024    ALKPHOS 42 10/24/2024    BILITOT 0.6 10/24/2024       LIPID PANEL -   No results found for: \"CHOL\", \"TRIG\", \"HDL\", \"CHHDL\", \"LDLF\", \"VLDL\", \"NHDL\"    RENAL FUNCTION PANEL -   Lab Results   Component Value Date    GLUCOSE 74 10/24/2024     (L) 10/24/2024    K 3.7 10/24/2024     10/24/2024    CO2 23 10/24/2024    ANIONGAP 16 10/24/2024    BUN 10 10/24/2024    CREATININE 0.59 10/24/2024    CALCIUM 8.9 10/24/2024    ALBUMIN 4.5 10/24/2024        No results found for: \"BNP\", \"HGBA1C\"      Diagnostic review: I have independently interpreted the Echocardiogram and Holter Monitor .  My findings are as summarized in the reports.    Assessment/Plan   Assessment & Plan  Syncope and collapse    Orders:    Follow Up In Cardiology    Referral to Cardiac Electrophysiology; Future    Atypical chest pain    Orders:    Follow Up In Cardiology    POTS (postural orthostatic tachycardia syndrome)    Orders:    Referral to Cardiac Electrophysiology; Future    The recommendations we discussed at the previous visit were once again reiterated.      Israel Silva MD  "

## 2024-12-02 NOTE — PATIENT INSTRUCTIONS

## 2024-12-12 ENCOUNTER — APPOINTMENT (OUTPATIENT)
Dept: CARDIOLOGY | Facility: CLINIC | Age: 24
End: 2024-12-12
Payer: COMMERCIAL

## 2024-12-12 ENCOUNTER — APPOINTMENT (OUTPATIENT)
Dept: CARDIOLOGY | Facility: HOSPITAL | Age: 24
End: 2024-12-12
Payer: COMMERCIAL

## 2024-12-12 ENCOUNTER — HOSPITAL ENCOUNTER (EMERGENCY)
Facility: HOSPITAL | Age: 24
Discharge: HOME | End: 2024-12-12
Attending: EMERGENCY MEDICINE
Payer: COMMERCIAL

## 2024-12-12 ENCOUNTER — APPOINTMENT (OUTPATIENT)
Dept: RADIOLOGY | Facility: HOSPITAL | Age: 24
End: 2024-12-12
Payer: COMMERCIAL

## 2024-12-12 VITALS
SYSTOLIC BLOOD PRESSURE: 129 MMHG | HEART RATE: 81 BPM | OXYGEN SATURATION: 100 % | DIASTOLIC BLOOD PRESSURE: 82 MMHG | BODY MASS INDEX: 21.4 KG/M2 | RESPIRATION RATE: 18 BRPM | WEIGHT: 109 LBS | TEMPERATURE: 98 F | HEIGHT: 60 IN

## 2024-12-12 DIAGNOSIS — R55 VASOVAGAL SYNCOPE: Primary | ICD-10-CM

## 2024-12-12 LAB
ALBUMIN SERPL BCP-MCNC: 3.8 G/DL (ref 3.4–5)
ALP SERPL-CCNC: 38 U/L (ref 33–110)
ALT SERPL W P-5'-P-CCNC: 9 U/L (ref 7–45)
ANION GAP SERPL CALC-SCNC: 9 MMOL/L (ref 10–20)
AST SERPL W P-5'-P-CCNC: 17 U/L (ref 9–39)
BASOPHILS # BLD AUTO: 0.05 X10*3/UL (ref 0–0.1)
BASOPHILS NFR BLD AUTO: 0.7 %
BILIRUB DIRECT SERPL-MCNC: 0 MG/DL (ref 0–0.3)
BILIRUB SERPL-MCNC: 0.3 MG/DL (ref 0–1.2)
BUN SERPL-MCNC: 8 MG/DL (ref 6–23)
CALCIUM SERPL-MCNC: 8.5 MG/DL (ref 8.6–10.3)
CARDIAC TROPONIN I PNL SERPL HS: <3 NG/L (ref 0–13)
CHLORIDE SERPL-SCNC: 106 MMOL/L (ref 98–107)
CO2 SERPL-SCNC: 25 MMOL/L (ref 21–32)
CREAT SERPL-MCNC: 0.59 MG/DL (ref 0.5–1.05)
EGFRCR SERPLBLD CKD-EPI 2021: >90 ML/MIN/1.73M*2
EOSINOPHIL # BLD AUTO: 0.07 X10*3/UL (ref 0–0.7)
EOSINOPHIL NFR BLD AUTO: 1 %
ERYTHROCYTE [DISTWIDTH] IN BLOOD BY AUTOMATED COUNT: 12.1 % (ref 11.5–14.5)
GLUCOSE SERPL-MCNC: 82 MG/DL (ref 74–99)
HCT VFR BLD AUTO: 35.1 % (ref 36–46)
HGB BLD-MCNC: 12.3 G/DL (ref 12–16)
IMM GRANULOCYTES # BLD AUTO: 0.03 X10*3/UL (ref 0–0.7)
IMM GRANULOCYTES NFR BLD AUTO: 0.4 % (ref 0–0.9)
LYMPHOCYTES # BLD AUTO: 3.11 X10*3/UL (ref 1.2–4.8)
LYMPHOCYTES NFR BLD AUTO: 45.6 %
MCH RBC QN AUTO: 30 PG (ref 26–34)
MCHC RBC AUTO-ENTMCNC: 35 G/DL (ref 32–36)
MCV RBC AUTO: 86 FL (ref 80–100)
MONOCYTES # BLD AUTO: 0.44 X10*3/UL (ref 0.1–1)
MONOCYTES NFR BLD AUTO: 6.5 %
NEUTROPHILS # BLD AUTO: 3.12 X10*3/UL (ref 1.2–7.7)
NEUTROPHILS NFR BLD AUTO: 45.8 %
NRBC BLD-RTO: 0 /100 WBCS (ref 0–0)
PLATELET # BLD AUTO: 372 X10*3/UL (ref 150–450)
POTASSIUM SERPL-SCNC: 3.3 MMOL/L (ref 3.5–5.3)
PROT SERPL-MCNC: 7 G/DL (ref 6.4–8.2)
RBC # BLD AUTO: 4.1 X10*6/UL (ref 4–5.2)
SODIUM SERPL-SCNC: 137 MMOL/L (ref 136–145)
WBC # BLD AUTO: 6.8 X10*3/UL (ref 4.4–11.3)

## 2024-12-12 PROCEDURE — 72125 CT NECK SPINE W/O DYE: CPT | Performed by: RADIOLOGY

## 2024-12-12 PROCEDURE — 99285 EMERGENCY DEPT VISIT HI MDM: CPT | Mod: 25 | Performed by: EMERGENCY MEDICINE

## 2024-12-12 PROCEDURE — 85025 COMPLETE CBC W/AUTO DIFF WBC: CPT | Performed by: EMERGENCY MEDICINE

## 2024-12-12 PROCEDURE — 2500000004 HC RX 250 GENERAL PHARMACY W/ HCPCS (ALT 636 FOR OP/ED): Performed by: EMERGENCY MEDICINE

## 2024-12-12 PROCEDURE — 70450 CT HEAD/BRAIN W/O DYE: CPT

## 2024-12-12 PROCEDURE — 96361 HYDRATE IV INFUSION ADD-ON: CPT

## 2024-12-12 PROCEDURE — 80053 COMPREHEN METABOLIC PANEL: CPT | Performed by: EMERGENCY MEDICINE

## 2024-12-12 PROCEDURE — 70450 CT HEAD/BRAIN W/O DYE: CPT | Performed by: RADIOLOGY

## 2024-12-12 PROCEDURE — 96360 HYDRATION IV INFUSION INIT: CPT

## 2024-12-12 PROCEDURE — 72125 CT NECK SPINE W/O DYE: CPT

## 2024-12-12 PROCEDURE — 84484 ASSAY OF TROPONIN QUANT: CPT | Performed by: EMERGENCY MEDICINE

## 2024-12-12 PROCEDURE — 93005 ELECTROCARDIOGRAM TRACING: CPT

## 2024-12-12 PROCEDURE — 36415 COLL VENOUS BLD VENIPUNCTURE: CPT | Performed by: EMERGENCY MEDICINE

## 2024-12-12 RX ADMIN — SODIUM CHLORIDE 500 ML: 9 INJECTION, SOLUTION INTRAVENOUS at 14:40

## 2024-12-12 ASSESSMENT — COLUMBIA-SUICIDE SEVERITY RATING SCALE - C-SSRS
6. HAVE YOU EVER DONE ANYTHING, STARTED TO DO ANYTHING, OR PREPARED TO DO ANYTHING TO END YOUR LIFE?: NO
2. HAVE YOU ACTUALLY HAD ANY THOUGHTS OF KILLING YOURSELF?: NO
1. IN THE PAST MONTH, HAVE YOU WISHED YOU WERE DEAD OR WISHED YOU COULD GO TO SLEEP AND NOT WAKE UP?: NO

## 2024-12-12 ASSESSMENT — PAIN DESCRIPTION - DESCRIPTORS: DESCRIPTORS: ACHING

## 2024-12-12 ASSESSMENT — PAIN DESCRIPTION - LOCATION: LOCATION: HEAD

## 2024-12-12 ASSESSMENT — PAIN DESCRIPTION - PAIN TYPE: TYPE: ACUTE PAIN

## 2024-12-12 ASSESSMENT — PAIN SCALES - GENERAL: PAINLEVEL_OUTOF10: 4

## 2024-12-12 ASSESSMENT — PAIN - FUNCTIONAL ASSESSMENT: PAIN_FUNCTIONAL_ASSESSMENT: 0-10

## 2024-12-12 NOTE — ED TRIAGE NOTES
Pt to ed with c/o syncopal episode. Pt states she felt dizzy and lowered her self to the ground. Pt got back up since she felt better but then had a syncopal episode as soon as she got back up. Pt denies hitting head.

## 2024-12-12 NOTE — ED PROVIDER NOTES
HPI   Chief Complaint   Patient presents with    Syncope       HPI: []  24-year-old white female history of lupus on Plaquenil comes in with syncopal event.  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  Questionable head trauma.  It was brief.  She had no chest pain pressure heaviness.  She had some shortness of breath earlier but not anymore.  No headache vision changes.  Head neck pain.  She denies abdominal pain nausea vomiting history of POTS syndrome questionable.  She denies any tongue biting denies any incontinence and denies any altered mental status.    Past history: Lupus  Social: Patient denies current tobacco alcohol drug use.  REVIEW OF SYSTEMS:    GENERAL.: No weight loss, fatigue, anorexia, insomnia, fever.    EYES: No vision loss, double vision, drainage, eye pain.    ENT: No pharyngitis, dry mouth.    CARDIOPULMONARY: No chest pain, palpitations, positive for syncope, near syncope. No shortness of breath, cough, hemoptysis.    GI: No abdominal pain, change in bowel habits, melena, hematemesis, hematochezia, nausea, vomiting, diarrhea.    : No discharge, dysuria, frequency, urgency, hematuria.    MS: No limb pain, joint pain, joint swelling.  Positive neck pain    SKIN: No rashes.    PSYCH: No depression, anxiety, suicidality, homicidality.    Review of systems is otherwise negative unless stated above or in history of present illness.  Social history, family history, allergies reviewed.  PHYSICAL EXAM:    GENERAL: Vitals noted, no distress. Alert and oriented  x 3. Non-toxic.      EENT: TMs clear. Posterior oropharynx unremarkable. No meningismus. No LAD.     NECK: Supple. Nontender. No midline tenderness.     CARDIAC: Regular, rate, rhythm. No murmurs rubs or gallops. No JVD    PULMONARY: Lungs clear bilaterally with good aeration. No wheezes rales or rhonchi. No respiratory distress.     ABDOMEN: Soft, nonsurgical.  Nontender. No peritoneal signs. Normoactive bowel sounds. No pulsatile masses.     EXTREMITIES: No peripheral edema. Negative Homans bilaterally, no cords.  2+ bounding pulses well-perfused  Musculoskeletal: Patient no midline C, T, L-spine tenderness.  Pelvis stable good range of motion of both hips and ankles.  SKIN: No rash. Intact.     NEURO: No focal neurologic deficits, NIH score of 0. Cranial nerves normal as tested from II through XII.     MEDICAL DECISION MAKING:  EKG on my interpretation shows normal sinus rhythm normal axis rate in the mid 70s with no acute ischemic change.    CBC with differential chemistry LFTs are within normal limits troponin negative CT head negative CT C-spine negative.    Treatment: IV established placed on cardiac monitor given IV fluids    ED course: Patient kyaw stable hemodynamic.    Impression: Syncope mostly vasovagal    Plan set MDM: 24-year-old female with history of lupus comes in with syncopal event was most likely vasovagal event low concern for stroke TIA STEMI NSTEMI dissection pulm embolism or a malignant cardiac arrhythmia, no red flags in the history of examination that warrant any further workup or hospitalization patient feels better will be discharged home outpatient follow-up recommended with primary doctor with strict return precaution.              Patient History   Past Medical History:   Diagnosis Date    ADHD 09/02/2020    ADHD (attention deficit hyperactivity disorder)     Allergic     Eating disorder     Eczema     Headache     Irregular heart beat 09/02/2020    Scoliosis     Visual impairment      Past Surgical History:   Procedure Laterality Date    WISDOM TOOTH EXTRACTION       Family History   Problem Relation Name Age of Onset    No Known Problems Mother      No Known Problems Father       Social History     Tobacco Use    Smoking status: Never    Smokeless tobacco: Never   Substance Use Topics    Alcohol use: Yes     Comment: Occasional / Every  other Month.    Drug use: Never       Physical Exam   ED Triage Vitals [12/12/24 1418]   Temperature Heart Rate Respirations BP   36.7 °C (98 °F) 81 18 129/82      Pulse Ox Temp src Heart Rate Source Patient Position   100 % -- -- --      BP Location FiO2 (%)     -- --       Physical Exam      ED Course & [facility]   ED Course as of 12/12/24 1702   Thu Dec 12, 2024   1700 EKG has no acute ischemic changes.  CBC with differential chemistries LFTs are unremarkable potassium 3.3 troponin negative CT head negative CT C-spine negative patient reassured be discharged with supportive care mostly he had a vasovagal event low suspicion for STEMI NSTEMI dissection or malignant cardiac arrhythmia or pulm embolism. [MT]      ED Course User Index  [MT] Derian Newton MD         Diagnoses as of 12/12/24 1702   Vasovagal syncope                 No data recorded                                 Medical Decision Making      Procedure  Procedures     Derian Newton MD  12/12/24 1704

## 2024-12-15 LAB
ATRIAL RATE: 77 BPM
P AXIS: 74 DEGREES
P OFFSET: 204 MS
P ONSET: 157 MS
PR INTERVAL: 124 MS
Q ONSET: 219 MS
QRS COUNT: 13 BEATS
QRS DURATION: 76 MS
QT INTERVAL: 358 MS
QTC CALCULATION(BAZETT): 405 MS
QTC FREDERICIA: 389 MS
R AXIS: 87 DEGREES
T AXIS: 66 DEGREES
T OFFSET: 398 MS
VENTRICULAR RATE: 77 BPM

## 2024-12-16 ENCOUNTER — HOSPITAL ENCOUNTER (EMERGENCY)
Facility: HOSPITAL | Age: 24
Discharge: LEFT BEFORE DISCHARGE | End: 2024-12-16
Payer: COMMERCIAL

## 2024-12-16 ENCOUNTER — APPOINTMENT (OUTPATIENT)
Dept: CARDIOLOGY | Facility: HOSPITAL | Age: 24
End: 2024-12-16
Payer: COMMERCIAL

## 2024-12-16 ENCOUNTER — APPOINTMENT (OUTPATIENT)
Dept: RADIOLOGY | Facility: HOSPITAL | Age: 24
End: 2024-12-16
Payer: COMMERCIAL

## 2024-12-16 VITALS
WEIGHT: 109 LBS | TEMPERATURE: 99 F | OXYGEN SATURATION: 95 % | HEART RATE: 68 BPM | RESPIRATION RATE: 18 BRPM | SYSTOLIC BLOOD PRESSURE: 135 MMHG | BODY MASS INDEX: 21.4 KG/M2 | DIASTOLIC BLOOD PRESSURE: 97 MMHG | HEIGHT: 60 IN

## 2024-12-16 PROCEDURE — 71045 X-RAY EXAM CHEST 1 VIEW: CPT | Performed by: RADIOLOGY

## 2024-12-16 PROCEDURE — 71045 X-RAY EXAM CHEST 1 VIEW: CPT

## 2024-12-16 PROCEDURE — 99284 EMERGENCY DEPT VISIT MOD MDM: CPT

## 2024-12-16 PROCEDURE — 93005 ELECTROCARDIOGRAM TRACING: CPT

## 2024-12-16 ASSESSMENT — PAIN SCALES - GENERAL: PAINLEVEL_OUTOF10: 0 - NO PAIN

## 2024-12-16 ASSESSMENT — PAIN - FUNCTIONAL ASSESSMENT: PAIN_FUNCTIONAL_ASSESSMENT: 0-10

## 2024-12-16 NOTE — ED TRIAGE NOTES
This patient was seen in triage.     Vitals are noted.      HPI:  Patient is a 24-year-old female, presenting to the emergency department with near syncope, felt lightheaded, dizzy with some shortness of breath, chest discomfort, palpitations.  Was seen last week for syncopal episode while at work, had a negative workup at that time.  Takes Plaquenil, oral contraceptives.  Denies any history of PE or DVT.  Denies any fevers, chills, cough, hemoptysis.  Denies any unilateral leg pain or swelling.  Denies any chance of pregnancy.    Focused PE:  Gen: Well-appearing, not in acute distress  Cardiovascular: Regular rate, normal rhythm, no murmur, no gallop  Respiratory: No adventitious lung sounds auscultated.  Abdomen: No reproducible abdominal tenderness upon palpation,  physical exam may be limited by patient positioning sitting up in a chair  Neuro:  Alert and Oriented, speech clear and coherent     Plan:  IV, lab work, EKG, imaging        For the remainder of the patient's workup and ED course, please see the main ED provider note.  We discussed need for diagnostic testing including lab studies and imaging.  We also discussed that they may be asked to wait in the waiting room while these test are pending.  They understand that if they choose to leave without having the testing completed or resulted that we cannot rule out acute life-threatening illnesses and the risks involved to lead to worsening condition, permanent disability or even death.

## 2024-12-17 ENCOUNTER — TELEPHONE (OUTPATIENT)
Dept: PRIMARY CARE | Facility: CLINIC | Age: 24
End: 2024-12-17
Payer: COMMERCIAL

## 2024-12-17 LAB
ATRIAL RATE: 70 BPM
P AXIS: 68 DEGREES
P OFFSET: 206 MS
P ONSET: 159 MS
PR INTERVAL: 126 MS
Q ONSET: 222 MS
QRS COUNT: 11 BEATS
QRS DURATION: 78 MS
QT INTERVAL: 390 MS
QTC CALCULATION(BAZETT): 421 MS
QTC FREDERICIA: 410 MS
R AXIS: 82 DEGREES
T AXIS: 69 DEGREES
T OFFSET: 417 MS
VENTRICULAR RATE: 70 BPM

## 2024-12-18 ENCOUNTER — APPOINTMENT (OUTPATIENT)
Dept: RADIOLOGY | Facility: HOSPITAL | Age: 24
End: 2024-12-18
Payer: COMMERCIAL

## 2024-12-18 ENCOUNTER — HOSPITAL ENCOUNTER (OUTPATIENT)
Facility: HOSPITAL | Age: 24
Setting detail: OBSERVATION
Discharge: HOME | End: 2024-12-19
Attending: STUDENT IN AN ORGANIZED HEALTH CARE EDUCATION/TRAINING PROGRAM | Admitting: HOSPITALIST
Payer: COMMERCIAL

## 2024-12-18 DIAGNOSIS — R42 DIZZINESS: Primary | ICD-10-CM

## 2024-12-18 DIAGNOSIS — R51.9 ACUTE INTRACTABLE HEADACHE, UNSPECIFIED HEADACHE TYPE: ICD-10-CM

## 2024-12-18 DIAGNOSIS — R55 NEAR SYNCOPE: ICD-10-CM

## 2024-12-18 LAB
ALBUMIN SERPL BCP-MCNC: 4.1 G/DL (ref 3.4–5)
ALP SERPL-CCNC: 37 U/L (ref 33–110)
ALT SERPL W P-5'-P-CCNC: 9 U/L (ref 7–45)
AMPHETAMINES UR QL SCN: NORMAL
ANION GAP SERPL CALC-SCNC: 11 MMOL/L (ref 10–20)
APPEARANCE UR: CLEAR
AST SERPL W P-5'-P-CCNC: 19 U/L (ref 9–39)
B-HCG SERPL-ACNC: <2 MIU/ML
BACTERIA #/AREA URNS AUTO: ABNORMAL /HPF
BARBITURATES UR QL SCN: NORMAL
BASOPHILS # BLD AUTO: 0.07 X10*3/UL (ref 0–0.1)
BASOPHILS NFR BLD AUTO: 0.7 %
BENZODIAZ UR QL SCN: NORMAL
BILIRUB SERPL-MCNC: 0.4 MG/DL (ref 0–1.2)
BILIRUB UR STRIP.AUTO-MCNC: NEGATIVE MG/DL
BNP SERPL-MCNC: 13 PG/ML (ref 0–99)
BUN SERPL-MCNC: 11 MG/DL (ref 6–23)
BZE UR QL SCN: NORMAL
CALCIUM SERPL-MCNC: 8.7 MG/DL (ref 8.6–10.3)
CANNABINOIDS UR QL SCN: NORMAL
CARDIAC TROPONIN I PNL SERPL HS: <3 NG/L (ref 0–13)
CHLORIDE SERPL-SCNC: 105 MMOL/L (ref 98–107)
CO2 SERPL-SCNC: 25 MMOL/L (ref 21–32)
COLOR UR: COLORLESS
CREAT SERPL-MCNC: 0.59 MG/DL (ref 0.5–1.05)
CRP SERPL-MCNC: 0.58 MG/DL
EGFRCR SERPLBLD CKD-EPI 2021: >90 ML/MIN/1.73M*2
EOSINOPHIL # BLD AUTO: 0.11 X10*3/UL (ref 0–0.7)
EOSINOPHIL NFR BLD AUTO: 1 %
ERYTHROCYTE [DISTWIDTH] IN BLOOD BY AUTOMATED COUNT: 12.2 % (ref 11.5–14.5)
ERYTHROCYTE [SEDIMENTATION RATE] IN BLOOD BY WESTERGREN METHOD: 5 MM/H (ref 0–20)
FENTANYL+NORFENTANYL UR QL SCN: NORMAL
GLUCOSE SERPL-MCNC: 73 MG/DL (ref 74–99)
GLUCOSE UR STRIP.AUTO-MCNC: NORMAL MG/DL
HCG UR QL IA.RAPID: NEGATIVE
HCT VFR BLD AUTO: 35.9 % (ref 36–46)
HGB BLD-MCNC: 12 G/DL (ref 12–16)
IMM GRANULOCYTES # BLD AUTO: 0.06 X10*3/UL (ref 0–0.7)
IMM GRANULOCYTES NFR BLD AUTO: 0.6 % (ref 0–0.9)
KETONES UR STRIP.AUTO-MCNC: ABNORMAL MG/DL
LEUKOCYTE ESTERASE UR QL STRIP.AUTO: NEGATIVE
LYMPHOCYTES # BLD AUTO: 3.24 X10*3/UL (ref 1.2–4.8)
LYMPHOCYTES NFR BLD AUTO: 30.2 %
MAGNESIUM SERPL-MCNC: 2.28 MG/DL (ref 1.6–2.4)
MCH RBC QN AUTO: 30.6 PG (ref 26–34)
MCHC RBC AUTO-ENTMCNC: 33.4 G/DL (ref 32–36)
MCV RBC AUTO: 92 FL (ref 80–100)
METHADONE UR QL SCN: NORMAL
MONOCYTES # BLD AUTO: 0.62 X10*3/UL (ref 0.1–1)
MONOCYTES NFR BLD AUTO: 5.8 %
MUCOUS THREADS #/AREA URNS AUTO: ABNORMAL /LPF
NEUTROPHILS # BLD AUTO: 6.62 X10*3/UL (ref 1.2–7.7)
NEUTROPHILS NFR BLD AUTO: 61.7 %
NITRITE UR QL STRIP.AUTO: NEGATIVE
NRBC BLD-RTO: 0 /100 WBCS (ref 0–0)
OPIATES UR QL SCN: NORMAL
OXYCODONE+OXYMORPHONE UR QL SCN: NORMAL
PCP UR QL SCN: NORMAL
PH UR STRIP.AUTO: 7 [PH]
PLATELET # BLD AUTO: 372 X10*3/UL (ref 150–450)
POTASSIUM SERPL-SCNC: 4.2 MMOL/L (ref 3.5–5.3)
PROT SERPL-MCNC: 7.5 G/DL (ref 6.4–8.2)
PROT UR STRIP.AUTO-MCNC: ABNORMAL MG/DL
RBC # BLD AUTO: 3.92 X10*6/UL (ref 4–5.2)
RBC # UR STRIP.AUTO: ABNORMAL /UL
RBC #/AREA URNS AUTO: >20 /HPF
SODIUM SERPL-SCNC: 137 MMOL/L (ref 136–145)
SP GR UR STRIP.AUTO: >1.05
SQUAMOUS #/AREA URNS AUTO: ABNORMAL /HPF
TSH SERPL-ACNC: 0.57 MIU/L (ref 0.44–3.98)
UROBILINOGEN UR STRIP.AUTO-MCNC: NORMAL MG/DL
WBC # BLD AUTO: 10.7 X10*3/UL (ref 4.4–11.3)
WBC #/AREA URNS AUTO: ABNORMAL /HPF

## 2024-12-18 PROCEDURE — 96374 THER/PROPH/DIAG INJ IV PUSH: CPT

## 2024-12-18 PROCEDURE — 84443 ASSAY THYROID STIM HORMONE: CPT | Performed by: STUDENT IN AN ORGANIZED HEALTH CARE EDUCATION/TRAINING PROGRAM

## 2024-12-18 PROCEDURE — 2500000001 HC RX 250 WO HCPCS SELF ADMINISTERED DRUGS (ALT 637 FOR MEDICARE OP): Performed by: HOSPITALIST

## 2024-12-18 PROCEDURE — 70496 CT ANGIOGRAPHY HEAD: CPT | Performed by: RADIOLOGY

## 2024-12-18 PROCEDURE — 84702 CHORIONIC GONADOTROPIN TEST: CPT | Performed by: STUDENT IN AN ORGANIZED HEALTH CARE EDUCATION/TRAINING PROGRAM

## 2024-12-18 PROCEDURE — 84484 ASSAY OF TROPONIN QUANT: CPT | Performed by: STUDENT IN AN ORGANIZED HEALTH CARE EDUCATION/TRAINING PROGRAM

## 2024-12-18 PROCEDURE — 80307 DRUG TEST PRSMV CHEM ANLYZR: CPT | Performed by: STUDENT IN AN ORGANIZED HEALTH CARE EDUCATION/TRAINING PROGRAM

## 2024-12-18 PROCEDURE — G0378 HOSPITAL OBSERVATION PER HR: HCPCS

## 2024-12-18 PROCEDURE — 96375 TX/PRO/DX INJ NEW DRUG ADDON: CPT

## 2024-12-18 PROCEDURE — 2500000004 HC RX 250 GENERAL PHARMACY W/ HCPCS (ALT 636 FOR OP/ED): Performed by: EMERGENCY MEDICINE

## 2024-12-18 PROCEDURE — 83880 ASSAY OF NATRIURETIC PEPTIDE: CPT | Performed by: STUDENT IN AN ORGANIZED HEALTH CARE EDUCATION/TRAINING PROGRAM

## 2024-12-18 PROCEDURE — 2550000001 HC RX 255 CONTRASTS: Performed by: STUDENT IN AN ORGANIZED HEALTH CARE EDUCATION/TRAINING PROGRAM

## 2024-12-18 PROCEDURE — 85652 RBC SED RATE AUTOMATED: CPT | Performed by: STUDENT IN AN ORGANIZED HEALTH CARE EDUCATION/TRAINING PROGRAM

## 2024-12-18 PROCEDURE — 81025 URINE PREGNANCY TEST: CPT | Performed by: STUDENT IN AN ORGANIZED HEALTH CARE EDUCATION/TRAINING PROGRAM

## 2024-12-18 PROCEDURE — 70553 MRI BRAIN STEM W/O & W/DYE: CPT

## 2024-12-18 PROCEDURE — 86140 C-REACTIVE PROTEIN: CPT | Performed by: STUDENT IN AN ORGANIZED HEALTH CARE EDUCATION/TRAINING PROGRAM

## 2024-12-18 PROCEDURE — 80053 COMPREHEN METABOLIC PANEL: CPT | Performed by: STUDENT IN AN ORGANIZED HEALTH CARE EDUCATION/TRAINING PROGRAM

## 2024-12-18 PROCEDURE — 85025 COMPLETE CBC W/AUTO DIFF WBC: CPT | Performed by: STUDENT IN AN ORGANIZED HEALTH CARE EDUCATION/TRAINING PROGRAM

## 2024-12-18 PROCEDURE — 99285 EMERGENCY DEPT VISIT HI MDM: CPT | Mod: 25 | Performed by: STUDENT IN AN ORGANIZED HEALTH CARE EDUCATION/TRAINING PROGRAM

## 2024-12-18 PROCEDURE — 83735 ASSAY OF MAGNESIUM: CPT | Performed by: STUDENT IN AN ORGANIZED HEALTH CARE EDUCATION/TRAINING PROGRAM

## 2024-12-18 PROCEDURE — A9575 INJ GADOTERATE MEGLUMI 0.1ML: HCPCS | Performed by: STUDENT IN AN ORGANIZED HEALTH CARE EDUCATION/TRAINING PROGRAM

## 2024-12-18 PROCEDURE — 70496 CT ANGIOGRAPHY HEAD: CPT

## 2024-12-18 PROCEDURE — 36415 COLL VENOUS BLD VENIPUNCTURE: CPT | Performed by: STUDENT IN AN ORGANIZED HEALTH CARE EDUCATION/TRAINING PROGRAM

## 2024-12-18 PROCEDURE — 81001 URINALYSIS AUTO W/SCOPE: CPT | Mod: 59 | Performed by: STUDENT IN AN ORGANIZED HEALTH CARE EDUCATION/TRAINING PROGRAM

## 2024-12-18 PROCEDURE — 70553 MRI BRAIN STEM W/O & W/DYE: CPT | Performed by: STUDENT IN AN ORGANIZED HEALTH CARE EDUCATION/TRAINING PROGRAM

## 2024-12-18 PROCEDURE — G0426 INPT/ED TELECONSULT50: HCPCS | Performed by: HOSPITALIST

## 2024-12-18 PROCEDURE — 2500000004 HC RX 250 GENERAL PHARMACY W/ HCPCS (ALT 636 FOR OP/ED): Performed by: STUDENT IN AN ORGANIZED HEALTH CARE EDUCATION/TRAINING PROGRAM

## 2024-12-18 RX ORDER — KETOROLAC TROMETHAMINE 30 MG/ML
15 INJECTION, SOLUTION INTRAMUSCULAR; INTRAVENOUS EVERY 6 HOURS PRN
Status: DISCONTINUED | OUTPATIENT
Start: 2024-12-18 | End: 2024-12-19 | Stop reason: HOSPADM

## 2024-12-18 RX ORDER — GADOTERATE MEGLUMINE 376.9 MG/ML
10 INJECTION INTRAVENOUS
Status: COMPLETED | OUTPATIENT
Start: 2024-12-18 | End: 2024-12-18

## 2024-12-18 RX ORDER — ACETAMINOPHEN 325 MG/1
650 TABLET ORAL EVERY 6 HOURS PRN
Status: DISCONTINUED | OUTPATIENT
Start: 2024-12-18 | End: 2024-12-18

## 2024-12-18 RX ORDER — HYDROXYCHLOROQUINE SULFATE 200 MG/1
200 TABLET, FILM COATED ORAL DAILY
Status: DISCONTINUED | OUTPATIENT
Start: 2024-12-19 | End: 2024-12-19 | Stop reason: HOSPADM

## 2024-12-18 RX ORDER — POLYETHYLENE GLYCOL 3350 17 G/17G
17 POWDER, FOR SOLUTION ORAL 2 TIMES DAILY PRN
Status: DISCONTINUED | OUTPATIENT
Start: 2024-12-18 | End: 2024-12-19 | Stop reason: HOSPADM

## 2024-12-18 RX ORDER — BUTALBITAL, ACETAMINOPHEN AND CAFFEINE 50; 325; 40 MG/1; MG/1; MG/1
1 TABLET ORAL EVERY 4 HOURS PRN
Status: DISCONTINUED | OUTPATIENT
Start: 2024-12-18 | End: 2024-12-19 | Stop reason: HOSPADM

## 2024-12-18 RX ORDER — ACETAMINOPHEN 325 MG/1
650 TABLET ORAL EVERY 6 HOURS PRN
Status: DISCONTINUED | OUTPATIENT
Start: 2024-12-18 | End: 2024-12-19 | Stop reason: HOSPADM

## 2024-12-18 RX ORDER — ALUMINUM HYDROXIDE, MAGNESIUM HYDROXIDE, AND SIMETHICONE 1200; 120; 1200 MG/30ML; MG/30ML; MG/30ML
20 SUSPENSION ORAL EVERY 4 HOURS PRN
Status: DISCONTINUED | OUTPATIENT
Start: 2024-12-18 | End: 2024-12-19 | Stop reason: HOSPADM

## 2024-12-18 RX ORDER — SCOPOLAMINE 1 MG/3D
1 PATCH, EXTENDED RELEASE TRANSDERMAL
Status: DISCONTINUED | OUTPATIENT
Start: 2024-12-18 | End: 2024-12-19 | Stop reason: HOSPADM

## 2024-12-18 RX ORDER — DIPHENHYDRAMINE HYDROCHLORIDE 50 MG/ML
10 INJECTION INTRAMUSCULAR; INTRAVENOUS ONCE
Status: COMPLETED | OUTPATIENT
Start: 2024-12-18 | End: 2024-12-18

## 2024-12-18 RX ORDER — KETOROLAC TROMETHAMINE 30 MG/ML
15 INJECTION, SOLUTION INTRAMUSCULAR; INTRAVENOUS ONCE
Status: COMPLETED | OUTPATIENT
Start: 2024-12-18 | End: 2024-12-18

## 2024-12-18 RX ORDER — DIAZEPAM 5 MG/1
5 TABLET ORAL ONCE
Status: DISCONTINUED | OUTPATIENT
Start: 2024-12-18 | End: 2024-12-19

## 2024-12-18 RX ORDER — DEXAMETHASONE SODIUM PHOSPHATE 10 MG/ML
10 INJECTION INTRAMUSCULAR; INTRAVENOUS ONCE
Status: COMPLETED | OUTPATIENT
Start: 2024-12-18 | End: 2024-12-18

## 2024-12-18 RX ORDER — DIPHENHYDRAMINE HCL 25 MG
25 CAPSULE ORAL DAILY PRN
Status: DISCONTINUED | OUTPATIENT
Start: 2024-12-18 | End: 2024-12-19 | Stop reason: HOSPADM

## 2024-12-18 RX ORDER — TALC
9 POWDER (GRAM) TOPICAL NIGHTLY PRN
Status: DISCONTINUED | OUTPATIENT
Start: 2024-12-18 | End: 2024-12-19 | Stop reason: HOSPADM

## 2024-12-18 RX ORDER — ONDANSETRON HYDROCHLORIDE 2 MG/ML
4 INJECTION, SOLUTION INTRAVENOUS EVERY 4 HOURS PRN
Status: DISCONTINUED | OUTPATIENT
Start: 2024-12-18 | End: 2024-12-19 | Stop reason: HOSPADM

## 2024-12-18 RX ORDER — MECLIZINE HYDROCHLORIDE 25 MG/1
25 TABLET ORAL 3 TIMES DAILY PRN
Status: DISCONTINUED | OUTPATIENT
Start: 2024-12-18 | End: 2024-12-19 | Stop reason: HOSPADM

## 2024-12-18 RX ORDER — METOCLOPRAMIDE HYDROCHLORIDE 5 MG/ML
10 INJECTION INTRAMUSCULAR; INTRAVENOUS ONCE
Status: COMPLETED | OUTPATIENT
Start: 2024-12-18 | End: 2024-12-18

## 2024-12-18 SDOH — SOCIAL STABILITY: SOCIAL INSECURITY: HAS ANYONE EVER THREATENED TO HURT YOUR FAMILY OR YOUR PETS?: NO

## 2024-12-18 SDOH — SOCIAL STABILITY: SOCIAL INSECURITY
WITHIN THE LAST YEAR, HAVE YOU BEEN RAPED OR FORCED TO HAVE ANY KIND OF SEXUAL ACTIVITY BY YOUR PARTNER OR EX-PARTNER?: NO

## 2024-12-18 SDOH — SOCIAL STABILITY: SOCIAL INSECURITY: HAVE YOU HAD ANY THOUGHTS OF HARMING ANYONE ELSE?: NO

## 2024-12-18 SDOH — ECONOMIC STABILITY: FOOD INSECURITY: WITHIN THE PAST 12 MONTHS, YOU WORRIED THAT YOUR FOOD WOULD RUN OUT BEFORE YOU GOT THE MONEY TO BUY MORE.: OFTEN TRUE

## 2024-12-18 SDOH — SOCIAL STABILITY: SOCIAL INSECURITY: DO YOU FEEL UNSAFE GOING BACK TO THE PLACE WHERE YOU ARE LIVING?: NO

## 2024-12-18 SDOH — SOCIAL STABILITY: SOCIAL INSECURITY: ARE YOU OR HAVE YOU BEEN THREATENED OR ABUSED PHYSICALLY, EMOTIONALLY, OR SEXUALLY BY ANYONE?: NO

## 2024-12-18 SDOH — SOCIAL STABILITY: SOCIAL INSECURITY: ARE THERE ANY APPARENT SIGNS OF INJURIES/BEHAVIORS THAT COULD BE RELATED TO ABUSE/NEGLECT?: NO

## 2024-12-18 SDOH — ECONOMIC STABILITY: INCOME INSECURITY: IN THE PAST 12 MONTHS HAS THE ELECTRIC, GAS, OIL, OR WATER COMPANY THREATENED TO SHUT OFF SERVICES IN YOUR HOME?: NO

## 2024-12-18 SDOH — ECONOMIC STABILITY: FOOD INSECURITY: WITHIN THE PAST 12 MONTHS, THE FOOD YOU BOUGHT JUST DIDN'T LAST AND YOU DIDN'T HAVE MONEY TO GET MORE.: OFTEN TRUE

## 2024-12-18 SDOH — SOCIAL STABILITY: SOCIAL INSECURITY: WITHIN THE LAST YEAR, HAVE YOU BEEN AFRAID OF YOUR PARTNER OR EX-PARTNER?: NO

## 2024-12-18 SDOH — SOCIAL STABILITY: SOCIAL INSECURITY: WITHIN THE LAST YEAR, HAVE YOU BEEN HUMILIATED OR EMOTIONALLY ABUSED IN OTHER WAYS BY YOUR PARTNER OR EX-PARTNER?: NO

## 2024-12-18 SDOH — SOCIAL STABILITY: SOCIAL INSECURITY: ABUSE: ADULT

## 2024-12-18 SDOH — SOCIAL STABILITY: SOCIAL INSECURITY: DO YOU FEEL ANYONE HAS EXPLOITED OR TAKEN ADVANTAGE OF YOU FINANCIALLY OR OF YOUR PERSONAL PROPERTY?: NO

## 2024-12-18 SDOH — SOCIAL STABILITY: SOCIAL INSECURITY: HAVE YOU HAD THOUGHTS OF HARMING ANYONE ELSE?: NO

## 2024-12-18 SDOH — SOCIAL STABILITY: SOCIAL INSECURITY: DOES ANYONE TRY TO KEEP YOU FROM HAVING/CONTACTING OTHER FRIENDS OR DOING THINGS OUTSIDE YOUR HOME?: NO

## 2024-12-18 SDOH — SOCIAL STABILITY: SOCIAL INSECURITY
WITHIN THE LAST YEAR, HAVE YOU BEEN KICKED, HIT, SLAPPED, OR OTHERWISE PHYSICALLY HURT BY YOUR PARTNER OR EX-PARTNER?: NO

## 2024-12-18 ASSESSMENT — ACTIVITIES OF DAILY LIVING (ADL)
BATHING: INDEPENDENT
GROOMING: INDEPENDENT
FEEDING YOURSELF: INDEPENDENT
WALKS IN HOME: INDEPENDENT
HEARING - RIGHT EAR: FUNCTIONAL
JUDGMENT_ADEQUATE_SAFELY_COMPLETE_DAILY_ACTIVITIES: YES
ASSISTIVE_DEVICE: EYEGLASSES
ADEQUATE_TO_COMPLETE_ADL: YES
HEARING - LEFT EAR: FUNCTIONAL
DRESSING YOURSELF: INDEPENDENT
TOILETING: INDEPENDENT
PATIENT'S MEMORY ADEQUATE TO SAFELY COMPLETE DAILY ACTIVITIES?: YES
LACK_OF_TRANSPORTATION: NO

## 2024-12-18 ASSESSMENT — COGNITIVE AND FUNCTIONAL STATUS - GENERAL
DAILY ACTIVITIY SCORE: 24
MOBILITY SCORE: 24
PATIENT BASELINE BEDBOUND: NO

## 2024-12-18 ASSESSMENT — LIFESTYLE VARIABLES
AUDIT-C TOTAL SCORE: 1
HOW MANY STANDARD DRINKS CONTAINING ALCOHOL DO YOU HAVE ON A TYPICAL DAY: 1 OR 2
SKIP TO QUESTIONS 9-10: 1
AUDIT-C TOTAL SCORE: 1
HOW OFTEN DO YOU HAVE A DRINK CONTAINING ALCOHOL: MONTHLY OR LESS
HOW OFTEN DO YOU HAVE 6 OR MORE DRINKS ON ONE OCCASION: NEVER

## 2024-12-18 ASSESSMENT — PATIENT HEALTH QUESTIONNAIRE - PHQ9
SUM OF ALL RESPONSES TO PHQ9 QUESTIONS 1 & 2: 0
2. FEELING DOWN, DEPRESSED OR HOPELESS: NOT AT ALL
1. LITTLE INTEREST OR PLEASURE IN DOING THINGS: NOT AT ALL

## 2024-12-18 ASSESSMENT — PAIN SCALES - GENERAL
PAINLEVEL_OUTOF10: 0 - NO PAIN
PAINLEVEL_OUTOF10: 5 - MODERATE PAIN
PAINLEVEL_OUTOF10: 4
PAINLEVEL_OUTOF10: 5 - MODERATE PAIN
PAINLEVEL_OUTOF10: 5 - MODERATE PAIN

## 2024-12-18 ASSESSMENT — PAIN - FUNCTIONAL ASSESSMENT
PAIN_FUNCTIONAL_ASSESSMENT: 0-10
PAIN_FUNCTIONAL_ASSESSMENT: 0-10

## 2024-12-18 ASSESSMENT — PAIN SCALES - PAIN ASSESSMENT IN ADVANCED DEMENTIA (PAINAD): TOTALSCORE: MEDICATION (SEE MAR)

## 2024-12-18 ASSESSMENT — PAIN DESCRIPTION - LOCATION: LOCATION: HEAD

## 2024-12-18 NOTE — ED PROVIDER NOTES
HPI   Chief Complaint   Patient presents with    Dizziness    Chest Pain       24-year-old female with a pertinent past medical history of SLE on Plaquenil who presents with recurrent dizziness, headaches, neck pain and hallucinations.  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  She denies any other trauma, denies any fevers that been documented but notes that she will get shaking chills.  Denies any specific visual changes.  She does note that since she has been getting more and more dizzy she has noticed that she is getting a headache with these dizzy episodes and it is worse with standing up.  The pain radiates down her neck and into her back.  She describes the hallucinations as hearing things behind her and when she turns nothing is there or seeing things on her skin. She denies any drug use, any changes in medications. Denies any history of this occurring prior to October.              Patient History   Past Medical History:   Diagnosis Date    ADHD 09/02/2020    ADHD (attention deficit hyperactivity disorder)     Allergic     Eating disorder     Eczema     Headache     Irregular heart beat 09/02/2020    Scoliosis     Visual impairment      Past Surgical History:   Procedure Laterality Date    WISDOM TOOTH EXTRACTION       Family History   Problem Relation Name Age of Onset    No Known Problems Mother      No Known Problems Father       Social History     Tobacco Use    Smoking status: Never    Smokeless tobacco: Never   Substance Use Topics    Alcohol use: Yes     Comment: Occasional / Every other Month.    Drug use: Never       Physical Exam   ED Triage Vitals [12/18/24 1433]   Temperature Heart Rate Respirations BP   36.9 °C (98.5 °F) 74 16 108/80      Pulse Ox Temp Source Heart Rate Source Patient  Position   97 % Temporal Monitor Sitting      BP Location FiO2 (%)     Right arm --       Physical Exam  Vitals and nursing note reviewed.   Constitutional:       Appearance: She is not ill-appearing.   HENT:      Head: Atraumatic.      Nose: Nose normal.      Mouth/Throat:      Pharynx: Oropharynx is clear.   Eyes:      Extraocular Movements: Extraocular movements intact.      Pupils: Pupils are equal, round, and reactive to light.   Neck:      Vascular: No JVD.   Cardiovascular:      Rate and Rhythm: Normal rate.      Heart sounds: Normal heart sounds. No murmur heard.  Pulmonary:      Effort: Pulmonary effort is normal.      Breath sounds: Normal breath sounds.   Abdominal:      General: Abdomen is flat.      Palpations: Abdomen is soft.      Tenderness: There is no abdominal tenderness.   Musculoskeletal:         General: Normal range of motion.      Cervical back: Normal range of motion and neck supple.      Right lower leg: No edema.      Left lower leg: No edema.   Skin:     General: Skin is warm and dry.      Capillary Refill: Capillary refill takes less than 2 seconds.   Neurological:      General: No focal deficit present.      Mental Status: She is alert and oriented to person, place, and time.      GCS: GCS eye subscore is 4. GCS verbal subscore is 5. GCS motor subscore is 6.      Cranial Nerves: No cranial nerve deficit.      Sensory: No sensory deficit.      Motor: No weakness or pronator drift.      Coordination: Coordination normal. Finger-Nose-Finger Test normal. Rapid alternating movements normal.      Gait: Gait normal.           ED Course & MDM   ED Course as of 12/18/24 2320   Wed Dec 18, 2024   [id number] Patient is a 24-year-old female with a pertinent past medical history of SLE on Plaquenil who presents with recurrent dizziness, headaches, neck pain and hallucinations.  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  She denies any other trauma, denies any fevers that been documented but notes that she will get shaking chills.  Denies any specific visual changes.  She does note that since she has been getting more and more dizzy she has noticed that she is getting a headache with these dizzy episodes and it is worse with standing up.  The pain radiates down her neck and into her back.  She describes the hallucinations as hearing things behind her and when she turns nothing is there or seeing things on her skin. She denies any drug use, any changes in medications. Denies any history of this occurring prior to October.    On exam, patient is resting comfortably in no acute distress, neuroexam showing no focal deficits.  Not tachycardic or tachypneic, with standing does not have any orthostatic hypotension on the monitor, no increased tachycardia.  Does have increase in her headache with pain radiating down her neck and into her back with bring her chin to her chest, otherwise neck is supple.    Differential includes but not limited to dural venous thrombus, elevated intracranial pressures, low suspicion for mass with recent imaging, no concern for intracranial hemorrhage, could also consider vertigo, psychiatric illness, drug use, medication side effect, lower suspicion for meningitis. [KK]   1921 CT venogram head w and wo iv contrast  IMPRESSION:  1. Patent major intracranial venous structures.   [KK]   2025 Discussed the case with Dr. Ordoñez who agrees with the current management and feels that outpatient MRI and follow-up with a specialist would be the most appropriate management particular for an autonomic workup.  I agree with his assessment of the patient's current condition. [KK]      ED Course User Index  [KK] Jairo Pollack,          Diagnoses as of 12/18/24 2320   Dizziness   Near syncope    Acute intractable headache, unspecified headache type                 No data recorded     Armani Coma Scale Score: 15 (12/18/24 2302 : John Perez RN)                           Medical Decision Making  Patient presented after multiple prior workups for her headache and dizziness.  Exam largely unremarkable for any acute lateralizing process.  Lab work and CT venogram of the head to rule out insidious etiology unremarkable.  No concern for meningitis, intracranial abnormality largely ruled out.  I did discuss next steps and potential etiology of patient's symptoms with Dr. Ordoñez who agrees with the current management recommending an autonomic workup.  After discussion with the patient and she feels unsafe going home due to her dizziness and would have a hard time getting transport to and from appointments.  Because of her continued dizziness despite multiple medications, will admit for MRI imaging and neurology consult.        Procedure  Procedures     Jairo Pollack, DO  12/18/24 5146

## 2024-12-18 NOTE — ED TRIAGE NOTES
Pt reports to ED from work with complaint of chest pain and dizziness. Pt states it started about a month ago and she has been seen here 3 times but the dizziness is getting worse and the chest pain is not resolving.     Also experiencing nausea, chills, fatigue and palpitations. Pt also experiencing (visual and audio)  hallucinations (described as noises behind her, but when she turns nothing is there. Denies voices speaking to her).     Pt also LOC on Thursday (pt came to ED on Monday but left after triage), denies hitting head.       Hx: Lupus

## 2024-12-18 NOTE — LETTER
December 19, 2024     Patient: Essence Almodovar   YOB: 2000   Date of Visit: 12/18/2024       To Whom It May Concern:    Essence Almodovar was seen in the hospital on 12/19/2024. Please excuse Essence for her absence from work and can return to work on 12/23/2024.    If you have any questions or concerns, please don't hesitate to call.         Sincerely,         Matthew Tucker MD        CC: No Recipients

## 2024-12-19 VITALS
DIASTOLIC BLOOD PRESSURE: 73 MMHG | RESPIRATION RATE: 17 BRPM | OXYGEN SATURATION: 98 % | WEIGHT: 104.5 LBS | SYSTOLIC BLOOD PRESSURE: 110 MMHG | HEIGHT: 60 IN | BODY MASS INDEX: 20.52 KG/M2 | TEMPERATURE: 98.1 F | HEART RATE: 65 BPM

## 2024-12-19 LAB — GLUCOSE BLD MANUAL STRIP-MCNC: 121 MG/DL (ref 74–99)

## 2024-12-19 PROCEDURE — 99238 HOSP IP/OBS DSCHRG MGMT 30/<: CPT | Performed by: NURSE PRACTITIONER

## 2024-12-19 PROCEDURE — 2500000001 HC RX 250 WO HCPCS SELF ADMINISTERED DRUGS (ALT 637 FOR MEDICARE OP): Performed by: INTERNAL MEDICINE

## 2024-12-19 PROCEDURE — 99223 1ST HOSP IP/OBS HIGH 75: CPT | Performed by: PSYCHIATRY & NEUROLOGY

## 2024-12-19 PROCEDURE — 2500000001 HC RX 250 WO HCPCS SELF ADMINISTERED DRUGS (ALT 637 FOR MEDICARE OP): Performed by: HOSPITALIST

## 2024-12-19 PROCEDURE — 82947 ASSAY GLUCOSE BLOOD QUANT: CPT

## 2024-12-19 PROCEDURE — G0378 HOSPITAL OBSERVATION PER HR: HCPCS

## 2024-12-19 RX ORDER — SODIUM CHLORIDE 1000 MG
1000 TABLET, SOLUBLE MISCELLANEOUS
Status: DISCONTINUED | OUTPATIENT
Start: 2024-12-19 | End: 2024-12-19 | Stop reason: HOSPADM

## 2024-12-19 RX ORDER — SODIUM CHLORIDE 1000 MG
1000 TABLET, SOLUBLE MISCELLANEOUS
Qty: 60 TABLET | Refills: 0 | Status: SHIPPED | OUTPATIENT
Start: 2024-12-19 | End: 2025-01-18

## 2024-12-19 ASSESSMENT — PAIN SCALES - GENERAL: PAINLEVEL_OUTOF10: 0 - NO PAIN

## 2024-12-19 ASSESSMENT — ACTIVITIES OF DAILY LIVING (ADL): LACK_OF_TRANSPORTATION: NO

## 2024-12-19 NOTE — DISCHARGE SUMMARY
Discharge Diagnosis  Dizziness    Issues Requiring Follow-Up  Follow up with PCP    Hospital Course   Essence Almodovar is a 24 y.o. female with history of lupus on Plaquenil presents to [facility] emergency room  For dizziness and chest pain.  Patient has had multiple emergency room visits over the last last 2 months for episodes of chest pain, dizziness, and near syncope..  Her workups were unremarkable.  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  Today she reports similar complaints.  She states her headaches are worse with standing..  It is associated with pain radiating down her neck.  In addition, she is now reporting that she has been having auditory and visual hallucinations.  She hears someone speaking to her from behind, and and says she sees things on her skin.     On arrival to the emergency room, temperature 36.9, pulse 74, respirate 16, blood pressure 108/80, saturation 97% room air.  Orthstatic vitals: /81, HR 72, spo2 96% (lying) /76 HR 76 Spo2 96% (sitting) /82 HR 90 Spo2 98% (standing)      Lab work only remarkable for 1+ ketones in urine.  Urine drug screen negative.  Urine hCG negative.  Patient underwent a CT venogram of the head with and without contrast which showed patent major intracranial structures.      In the ER she was given Reglan 10 mg IV, Toradol 15 mg IV, Benadryl 10 mg IV, and Decadron 10 mg IV.     Patient states that she was diagnosed with migraine headaches approximately 5 years ago while living in [place].  She was only taking Excedrin for migraine relief.  She said her migraine headaches improved when she was started on Plaquenil last March.  Prior to leaving [place] she was seen by a neurologist early in the summer. and was told to follow-up with a neurologist in [place].  She states that she has been having hallucinations for months but has not expressed this to  anyone in [place] with her recent emergency room visits.  She states that she was never referred to a psychiatrist while living in [place].    Overnight patient felt much better headache. She was seen by neurology.  She deferred psych and is aware as an employee can reach out to employee assistance if needed as sessions allowed. She discussed with Dr Scott wallace. Will discharge with salt tabs and recommendations for TEDs/compression support.   Discharge       Pertinent Physical Exam At Time of Discharge  Physical Exam  Constitutional:       Appearance: Normal appearance.      Comments: thin   HENT:      Mouth/Throat:      Mouth: Mucous membranes are moist.   Cardiovascular:      Rate and Rhythm: Regular rhythm.   Pulmonary:      Effort: Pulmonary effort is normal.   Skin:     General: Skin is warm and dry.   Neurological:      General: No focal deficit present.      Mental Status: She is alert and oriented to person, place, and time.         Home Medications     Medication List      START taking these medications     sodium chloride 1,000 mg tablet; Take 1 tablet (1 g) by mouth 2 times   daily (morning and late afternoon).     ASK your doctor about these medications     Allergy 25 mg tablet; Generic drug: diphenhydrAMINE   hydroxychloroquine 200 mg tablet; Commonly known as: Plaquenil   ondansetron ODT 4 mg disintegrating tablet; Commonly known as:   Zofran-ODT   Zafemy 150-35 mcg/24 hr; Generic drug: norelgestromin-ethin.estradioL       Outpatient Follow-Up  Future Appointments   Date Time Provider Department Center   1/2/2025  3:20 PM MICHAEL Owusu [id number] [place]   1/27/2025  1:00 PM Dl Ott MD [id number] [place]       MICHAEL Campbell  Seen and discussed with Dr Franklin

## 2024-12-19 NOTE — CONSULTS
"INITIAL NEUROLOGY CONSULT NOTE    IMPRESSION:  Intermittent lightheadedness.  Given no change in BP or pulse with standing on orthostatics, POTS is unlikely.  Has cervicocranial syndrome and sinus headaches currently.  Completely normal neurological exam today.    RECOMMENDATIONS:  No acute neurological workup needed.  OK for discharge from neurological standpoint.  I advised cervical stretching exercises and sleeping with cervical support for the cervicocranial syndrome.  If not helpful, can use muscle relaxant, preferably tizanidine to start.  Management of sinus issues as the admitting team prefers.  Agree with referred to Cards-EP on an outpatient basis.  Discussed in person with Dr. Tucker.    Huey Ordoñez Jr., M.D., FAAN       History Of Present Illness  Essence Almodovar is a 24 y.o. female presenting with multiple somatic symptoms.    Positional dizziness daily, but not constantly, for the last two weeks, but more episodic for several weeks prior.  By \"dizzy\", she means she is lightheaded and imbalanced with sensation of heat, weak all over, severe fatigue, dyspnea, pounding and racing heart, but no blurred vision, chest pain/pressure.    The first episode was at work in October.  She presented to a [facility] ED the next day with palpitations, nausea and fatigue, an was recommended to go to an ED with worse symptoms.  The next day, she presented to the [facility] ED with chest pain, nausea, tachicardia, and neck pain.  EKG revealed ST depression but d-dimer and troponins were negative.  She saw her PCP a few days later, who referred her to Cardiology.    She was in the [facility] ED again on 11/12 for another lightheaded episode, but she left AMA.    She saw Cardiology shortly thereafter, and had workup including echo and Holter, both unremarkable.  She was then referred to Cards-Electrophysiology, and she hasn't had that appointment yet.    In the meantime, she has had more frequent episodes, which brought her to the ED " yesterday.    In addition, she has had headaches about a week ago are different in that they are crown and frontal pressure with nasal congestion.  Also has occipital pressure radiating down the posterior.    Her usual migraines are bifrontal and bitemporal pounding with photophobia, mild phonophobia, occasional nausea, no emesis.  Current frequency is once every three weeks.  Of note is that she was in urgent care with sinus headache three months ago.    She denies other focal neurological symptoms including dysarthria, dysphagia, diplopia, focal weakness, focal sensory change, ataxia, vertigo, or bowel/bladder incontinence, among others.      Past Medical History  Past Medical History:   Diagnosis Date    ADHD 09/02/2020    ADHD (attention deficit hyperactivity disorder)     Allergic     Eating disorder     Eczema     Headache     Irregular heart beat 09/02/2020    Scoliosis     Visual impairment      Surgical History  Past Surgical History:   Procedure Laterality Date    WISDOM TOOTH EXTRACTION       Social History  Social History     Tobacco Use    Smoking status: Never    Smokeless tobacco: Never   Substance Use Topics    Alcohol use: Yes     Comment: Occasional / Every other Month.    Drug use: Never       Scheduled medications  diazePAM, 5 mg, oral, Once  hydroxychloroquine, 200 mg, oral, Daily  scopolamine, 1 patch, transdermal, q72h      Continuous medications     PRN medications  PRN medications: acetaminophen, alum-mag hydroxide-simeth, benzocaine-menthol, butalbital-acetaminophen-caff, diphenhydrAMINE, ketorolac, meclizine, melatonin, ondansetron, polyethylene glycol      Family History   Problem Relation Name Age of Onset    No Known Problems Mother      No Known Problems Father       Allergies  Pineapple and Amoxicillin  Medications Prior to Admission   Medication Sig Dispense Refill Last Dose/Taking    diphenhydrAMINE (Allergy) 25 mg tablet Take 1 tablet (25 mg) by mouth once daily as needed for  sleep.   12/18/2024    hydroxychloroquine (Plaquenil) 200 mg tablet Take 1 tablet (200 mg) by mouth once daily.   12/18/2024 Morning    ondansetron ODT (Zofran-ODT) 4 mg disintegrating tablet Dissolve 1 tablet (4 mg) in the mouth every 8 hours if needed.   Past Month    Zafemy 150-35 mcg/24 hr Place 1 patch on the skin.   12/18/2024       Scheduled medications  diazePAM, 5 mg, oral, Once  hydroxychloroquine, 200 mg, oral, Daily  scopolamine, 1 patch, transdermal, q72h      Continuous medications     PRN medications  PRN medications: acetaminophen, alum-mag hydroxide-simeth, benzocaine-menthol, butalbital-acetaminophen-caff, diphenhydrAMINE, ketorolac, meclizine, melatonin, ondansetron, polyethylene glycol    Review of Systems    Last Recorded Vitals  Blood pressure 110/73, pulse 65, temperature 36.7 °C (98.1 °F), temperature source Temporal, resp. rate 17, height 1.524 m (5'), weight 47.4 kg (104 lb 8 oz), SpO2 98%.    CONSTITUTIONAL:  No acute distress    CARDIOVASCULAR:  Normal pulses in the distal legs, no edema of either arm or either leg.  No carotid bruits.    MENTAL STATUS:  Awake, alert, fully oriented to self, place, and time, with present short-term memory, good awareness of recent events, normal attention span, concentration, and fund of knowledge.    SPEECH AND LANGUAGE:  Can name and repeat, follows all commands, has no dysarthria    FUNDOSCOPIC:  No papilledema    CRANIAL NERVES:  II-Vision present, visual fields full to confrontational testing    III/IV/VI--EOMs are present in all directions.  Pupils are symmetrically reactive in dim light.  No ptosis.    V--Normal facial sensation.    VII--No facial asymmetry.    VIII--Hearing present to voice bilaterally.    IX/X--Symmetric soft palate rise.    XI--Normal trapezius power bilaterally.    XII--Tongue protrudes without deviation.    MOTOR:  Normal power, tone, and bulk in both arms and both legs.    SENSORY:  Normal pin sensation in both arms and both  legs without distal-proximal gradient, asymmetry, or spinal sensory level.    COORDINATION:  Normal finger-to-nose and heel-to-shin testing in both arms and both legs.    REFLEXES are normal and symmetric at the biceps, triceps, brachioradialis, patella, and ankle.  The plantar responses are flexor.    GAIT is normal, without steppage, ataxia, shuffling, or spasticity.    Relevant Results  Results for orders placed or performed during the hospital encounter of 12/18/24 (from the past 24 hours)   CBC and Auto Differential   Result Value Ref Range    WBC 10.7 4.4 - 11.3 x10*3/uL    nRBC 0.0 0.0 - 0.0 /100 WBCs    RBC 3.92 (L) 4.00 - 5.20 x10*6/uL    Hemoglobin 12.0 12.0 - 16.0 g/dL    Hematocrit 35.9 (L) 36.0 - 46.0 %    MCV 92 80 - 100 fL    MCH 30.6 26.0 - 34.0 pg    MCHC 33.4 32.0 - 36.0 g/dL    RDW 12.2 11.5 - 14.5 %    Platelets 372 150 - 450 x10*3/uL    Neutrophils % 61.7 40.0 - 80.0 %    Immature Granulocytes %, Automated 0.6 0.0 - 0.9 %    Lymphocytes % 30.2 13.0 - 44.0 %    Monocytes % 5.8 2.0 - 10.0 %    Eosinophils % 1.0 0.0 - 6.0 %    Basophils % 0.7 0.0 - 2.0 %    Neutrophils Absolute 6.62 1.20 - 7.70 x10*3/uL    Immature Granulocytes Absolute, Automated 0.06 0.00 - 0.70 x10*3/uL    Lymphocytes Absolute 3.24 1.20 - 4.80 x10*3/uL    Monocytes Absolute 0.62 0.10 - 1.00 x10*3/uL    Eosinophils Absolute 0.11 0.00 - 0.70 x10*3/uL    Basophils Absolute 0.07 0.00 - 0.10 x10*3/uL   Magnesium   Result Value Ref Range    Magnesium 2.28 1.60 - 2.40 mg/dL   Comprehensive metabolic panel   Result Value Ref Range    Glucose 73 (L) 74 - 99 mg/dL    Sodium 137 136 - 145 mmol/L    Potassium 4.2 3.5 - 5.3 mmol/L    Chloride 105 98 - 107 mmol/L    Bicarbonate 25 21 - 32 mmol/L    Anion Gap 11 10 - 20 mmol/L    Urea Nitrogen 11 6 - 23 mg/dL    Creatinine 0.59 0.50 - 1.05 mg/dL    eGFR >90 >60 mL/min/1.73m*2    Calcium 8.7 8.6 - 10.3 mg/dL    Albumin 4.1 3.4 - 5.0 g/dL    Alkaline Phosphatase 37 33 - 110 U/L    Total Protein  7.5 6.4 - 8.2 g/dL    AST 19 9 - 39 U/L    Bilirubin, Total 0.4 0.0 - 1.2 mg/dL    ALT 9 7 - 45 U/L   Troponin I, High Sensitivity   Result Value Ref Range    Troponin I, High Sensitivity <3 0 - 13 ng/L   C-Reactive Protein   Result Value Ref Range    C-Reactive Protein 0.58 <1.00 mg/dL   Sedimentation Rate   Result Value Ref Range    Sedimentation Rate 5 0 - 20 mm/h   B-Type Natriuretic Peptide   Result Value Ref Range    BNP 13 0 - 99 pg/mL   TSH with reflex to Free T4 if abnormal   Result Value Ref Range    Thyroid Stimulating Hormone 0.57 0.44 - 3.98 mIU/L   Human Chorionic Gonadotropin, Serum Quantitative   Result Value Ref Range    HCG, Beta-Quantitative <2 <5 mIU/mL   Urinalysis with Reflex Culture and Microscopic   Result Value Ref Range    Color, Urine Colorless (N) Light-Yellow, Yellow, Dark-Yellow    Appearance, Urine Clear Clear    Specific Gravity, Urine >1.050 (N) 1.005 - 1.035    pH, Urine 7.0 5.0, 5.5, 6.0, 6.5, 7.0, 7.5, 8.0    Protein, Urine 10 (TRACE) NEGATIVE, 10 (TRACE), 20 (TRACE) mg/dL    Glucose, Urine Normal Normal mg/dL    Blood, Urine 0.5 (2+) (A) NEGATIVE    Ketones, Urine 10 (1+) (A) NEGATIVE mg/dL    Bilirubin, Urine NEGATIVE NEGATIVE    Urobilinogen, Urine Normal Normal mg/dL    Nitrite, Urine NEGATIVE NEGATIVE    Leukocyte Esterase, Urine NEGATIVE NEGATIVE   Urinalysis Microscopic   Result Value Ref Range    WBC, Urine 6-10 (A) 1-5, NONE /HPF    RBC, Urine >20 (A) NONE, 1-2, 3-5 /HPF    Squamous Epithelial Cells, Urine 1-9 (SPARSE) Reference range not established. /HPF    Bacteria, Urine 1+ (A) NONE SEEN /HPF    Mucus, Urine FEW Reference range not established. /LPF   hCG, Urine, Qualitative   Result Value Ref Range    HCG, Urine NEGATIVE NEGATIVE   Drug Screen, Urine   Result Value Ref Range    Amphetamine Screen, Urine Presumptive Negative Presumptive Negative    Barbiturate Screen, Urine Presumptive Negative Presumptive Negative    Benzodiazepines Screen, Urine Presumptive Negative  Presumptive Negative    Cannabinoid Screen, Urine Presumptive Negative Presumptive Negative    Cocaine Metabolite Screen, Urine Presumptive Negative Presumptive Negative    Fentanyl Screen, Urine Presumptive Negative Presumptive Negative    Opiate Screen, Urine Presumptive Negative Presumptive Negative    Oxycodone Screen, Urine Presumptive Negative Presumptive Negative    PCP Screen, Urine Presumptive Negative Presumptive Negative    Methadone Screen, Urine Presumptive Negative Presumptive Negative   POCT GLUCOSE   Result Value Ref Range    POCT Glucose 121 (H) 74 - 99 mg/dL         I have personally reviewed the following imaging results:  CT head wo IV contrast    Result Date: 12/12/2024  Interpreted By:  Usha Wilson, STUDY: CT HEAD WO IV CONTRAST;  12/12/2024 3:01 pm   INDICATION: Signs/Symptoms:syncope.     COMPARISON: None.   ACCESSION NUMBER(S): [id number]   ORDERING CLINICIAN: AUGUSTUS ROGERS   TECHNIQUE: Noncontrast axial CT scan of head was performed. Angled reformats in brain and bone windows were generated. The images were reviewed in bone, brain, blood and soft tissue windows.   FINDINGS: CSF Spaces: The ventricles, sulci and basal cisterns are within normal limits. There is no extraaxial fluid collection.   Parenchyma:  The grey-white differentiation is intact. There is no mass effect or midline shift.  There is no intracranial hemorrhage.   Calvarium: The calvarium is unremarkable.   Paranasal sinuses and mastoids: Visualized paranasal sinuses and mastoids are clear.       No evidence of acute cortical infarct or intracranial hemorrhage.   No evidence of intracranial hemorrhage or displaced skull fracture.   MACRO: None   Signed by: Usha Wilson 12/12/2024 3:11 PM Dictation workstation:   [id number]    MR brain w and wo IV contrast    Result Date: 12/18/2024  Interpreted By:  Montrell Brown, STUDY: MR BRAIN W AND WO IV CONTRAST;  12/18/2024 9:33 pm   INDICATION: Signs/Symptoms:dizziness.      COMPARISON: Same day CT venogram of the head.   ACCESSION NUMBER(S): [id number]   ORDERING CLINICIAN: DERIC IVAN   TECHNIQUE: Multisequence and multiplanar MRI of the brain was performed before and after intravenous administration of 10 mL of Dotarem gadolinium contrast.   FINDINGS: No diffusion restriction abnormality is present intracranially to suggest an acute infarct.   There is no evidence of recent intracranial hemorrhage. No mass effect or midline shift is identified.   No abnormal ventricular dilatation is present. Basal cisterns are patent. No extra-axial fluid collections are identified.   There are several subtle punctate foci of nonenhancing hyperintense FLAIR signal are present in the periventricular and subcortical white matter of bilateral cerebral hemispheres (axial FLAIR images 16 of 41) without evidence of pathologic intracranial enhancement.   Visualized large arterial flow voids are preserved.   No abnormal fluid signal is present in the paranasal sinuses or mastoid air cells.       1.  No acute intracranial abnormality is identified. 2. Several tiny nonenhancing foci of hyperintense FLAIR signal are present in the periventricular white matter of bilateral cerebral hemispheres, nonspecific findings and may be seen in the setting of frequent migraine type headaches.     MACRO: None   Signed by: Montrell Brown 12/18/2024 10:20 PM Dictation workstation:   [id number]    No results found for this or any previous visit.          Huey Ordoñez Jr., M.D., FAAN

## 2024-12-19 NOTE — PROGRESS NOTES
Emergency Medicine Transition of Care Note.    I received Essence Almodovar in signout from Dr. Pollack.  Please see the previous ED provider note for all HPI, PE and MDM up to the time of signout. This is in addition to the primary record.    In brief Essence Almodovar is an 24 y.o. female presenting for   Chief Complaint   Patient presents with    Dizziness    Chest Pain     At the time of signout we were awaiting: admission    ED Course as of 12/18/24 2132   Wed Dec 18, 2024   1604 Patient is a 24-year-old female with a pertinent past medical history of SLE on Plaquenil who presents with recurrent dizziness, headaches, neck pain and hallucinations.  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  She denies any other trauma, denies any fevers that been documented but notes that she will get shaking chills.  Denies any specific visual changes.  She does note that since she has been getting more and more dizzy she has noticed that she is getting a headache with these dizzy episodes and it is worse with standing up.  The pain radiates down her neck and into her back.  She describes the hallucinations as hearing things behind her and when she turns nothing is there or seeing things on her skin. She denies any drug use, any changes in medications. Denies any history of this occurring prior to October.    On exam, patient is resting comfortably in no acute distress, neuroexam showing no focal deficits.  Not tachycardic or tachypneic, with standing does not have any orthostatic hypotension on the monitor, no increased tachycardia.  Does have increase in her headache with pain radiating down her neck and into her back with bring her chin to her chest, otherwise neck is supple.    Differential includes but not limited to dural  venous thrombus, elevated intracranial pressures, low suspicion for mass with recent imaging, no concern for intracranial hemorrhage, could also consider vertigo, psychiatric illness, drug use, medication side effect, lower suspicion for meningitis. [KK]   1921 CT venogram head w and wo iv contrast  IMPRESSION:  1. Patent major intracranial venous structures.   [KK]   2025 Discussed the case with Dr. Ordoñez who agrees with the current management and feels that outpatient MRI and follow-up with a specialist would be the most appropriate management particular for an autonomic workup.  I agree with his assessment of the patient's current condition. [KK]      ED Course User Index  [KK] Jairo Pollack, DO         Diagnoses as of 12/18/24 2132   Dizziness   Near syncope   Acute intractable headache, unspecified headache type       Medical Decision Making  Patient to be admitted.    Final diagnoses:   [R42] Dizziness   [R55] Near syncope   [R51.9] Acute intractable headache, unspecified headache type           Procedure  Procedures    Franck Kraft MD

## 2024-12-19 NOTE — PROGRESS NOTES
Transitional Care Coordination Progress Note:  Plan per Medical/Surgical team: treatment of CP, HA, syncope, neck pain with benadryl, decadron, toradol, reglan, scopolamine patch & neuro consult   Status: Observation  Payor source: [place]   Discharge disposition: Home alone  Potential Barriers: audible hallucinations with psych consult pending   ADOD: 12/19/2024  SHARLA Jimenez RN, BSN Transitional Care Coordinator ED# [phone]     When patient is medically ready for discharge  They are being discharged to: home  Friend will pick patient up  No SNF  No HHC  No DME  No O2  No Wounds      12/19/24 0906   Discharge Planning   Living Arrangements Alone   Support Systems Friends/neighbors   Assistance Needed Neuro & psych consults   Type of Residence Private residence   Number of Stairs to Enter Residence 0   Number of Stairs Within Residence 0   Do you have animals or pets at home? No   Home or Post Acute Services None   Expected Discharge Disposition Home   Does the patient need discharge transport arranged? No   Financial Resource Strain   How hard is it for you to pay for the very basics like food, housing, medical care, and heating? Not hard   Housing Stability   In the last 12 months, was there a time when you were not able to pay the mortgage or rent on time? N   In the past 12 months, how many times have you moved where you were living? 1   At any time in the past 12 months, were you homeless or living in a shelter (including now)? N   Transportation Needs   In the past 12 months, has lack of transportation kept you from medical appointments or from getting medications? no   In the past 12 months, has lack of transportation kept you from meetings, work, or from getting things needed for daily living? No   Patient Choice   Patient / Family choosing to utilize agency / facility established prior to hospitalization No   Stroke Family Assessment   Stroke Family Assessment Needed No   Intensity of Service    Intensity of Service 0-30 min

## 2024-12-19 NOTE — H&P
History Of Present Illness    Essence Almodovar is a 24 y.o. female with history of lupus on Plaquenil presents to [facility] emergency room  For dizziness and chest pain.  Patient has had multiple emergency room visits over the last last 2 months for episodes of chest pain, dizziness, and near syncope..  Her workups were unremarkable.  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  Today she reports similar complaints.  She states her headaches are worse with standing..  It is associated with pain radiating down her neck.  In addition, she is now reporting that she has been having auditory and visual hallucinations.  She hears someone speaking to her from behind, and and says she sees things on her skin.    On arrival to the emergency room, temperature 36.9, pulse 74, respirate 16, blood pressure 108/80, saturation 97% room air.  Orthstatic vitals: /81, HR 72, spo2 96% (lying) /76 HR 76 Spo2 96% (sitting) /82 HR 90 Spo2 98% (standing)     Lab work only remarkable for 1+ ketones in urine.  Urine drug screen negative.  Urine hCG negative.  Patient underwent a CT venogram of the head with and without contrast which showed patent major intracranial structures.     In the ER she was given Reglan 10 mg IV, Toradol 15 mg IV, Benadryl 10 mg IV, and Decadron 10 mg IV.    Patient states that she was diagnosed with migraine headaches approximately 5 years ago while living in [place].  She was only taking Excedrin for migraine relief.  She said her migraine headaches improved when she was started on Plaquenil last March.  Prior to leaving [place] she was seen by a neurologist early in the summer. and was told to follow-up with a neurologist in [place].  She states that she has been having hallucinations for months but has not expressed this to anyone in [place] with her recent emergency room visits.  She states that  she was never referred to a psychiatrist while living in [place].      Past Medical History  She has a past medical history of ADHD (09/02/2020), ADHD (attention deficit hyperactivity disorder), Allergic, Eating disorder, Eczema, Headache, Irregular heart beat (09/02/2020), Scoliosis, and Visual impairment.    Surgical History  She has a past surgical history that includes [place] tooth extraction.     Social History  She reports that she has never smoked. She has never used smokeless tobacco. She reports current alcohol use. She reports that she does not use drugs.    Family History  Family History   Problem Relation Name Age of Onset    No Known Problems Mother      No Known Problems Father          Allergies  Pineapple and Amoxicillin    Review of Systems  10 point organ system reviewed, pertinent positives mentioned HPI, others negative.    Physical Exam  Gen.: Alert and oriented ×3, no acute distress  Head: Normocephalic atraumatic  Eyes:  Pupils equal reactive to light, extraocular muscle intact  Neck: No cervical lymphadenopathy thyromegaly, trachea midline   Heart: Regular rate and rhythm, no murmurs rubs or gallops  Lungs: Clear to auscultation bilaterally, no wheezes, rales, or rhonchi  Abdomen: Soft nontender positive bowel sounds, no rebound or guarding  Extremities: No peripheral edema cyanosis or clubbing  Skin: Warm and intact  Neuro: Cranial nerves II 2 through 12 grossly intact, no focal deficits  Psych: Insight and judgment intact, flat affect     Last Recorded Vitals  /72   Pulse 82   Temp 36.9 °C (98.5 °F) (Temporal)   Resp 18   Wt 49.4 kg (109 lb)   SpO2 98%     Relevant Results  Results for orders placed or performed during the hospital encounter of 12/18/24 (from the past 96 hours)   CBC and Auto Differential   Result Value Ref Range    WBC 10.7 4.4 - 11.3 x10*3/uL    nRBC 0.0 0.0 - 0.0 /100 WBCs    RBC 3.92 (L) 4.00 - 5.20 x10*6/uL    Hemoglobin 12.0 12.0 - 16.0 g/dL    Hematocrit  35.9 (L) 36.0 - 46.0 %    MCV 92 80 - 100 fL    MCH 30.6 26.0 - 34.0 pg    MCHC 33.4 32.0 - 36.0 g/dL    RDW 12.2 11.5 - 14.5 %    Platelets 372 150 - 450 x10*3/uL    Neutrophils % 61.7 40.0 - 80.0 %    Immature Granulocytes %, Automated 0.6 0.0 - 0.9 %    Lymphocytes % 30.2 13.0 - 44.0 %    Monocytes % 5.8 2.0 - 10.0 %    Eosinophils % 1.0 0.0 - 6.0 %    Basophils % 0.7 0.0 - 2.0 %    Neutrophils Absolute 6.62 1.20 - 7.70 x10*3/uL    Immature Granulocytes Absolute, Automated 0.06 0.00 - 0.70 x10*3/uL    Lymphocytes Absolute 3.24 1.20 - 4.80 x10*3/uL    Monocytes Absolute 0.62 0.10 - 1.00 x10*3/uL    Eosinophils Absolute 0.11 0.00 - 0.70 x10*3/uL    Basophils Absolute 0.07 0.00 - 0.10 x10*3/uL   Magnesium   Result Value Ref Range    Magnesium 2.28 1.60 - 2.40 mg/dL   Comprehensive metabolic panel   Result Value Ref Range    Glucose 73 (L) 74 - 99 mg/dL    Sodium 137 136 - 145 mmol/L    Potassium 4.2 3.5 - 5.3 mmol/L    Chloride 105 98 - 107 mmol/L    Bicarbonate 25 21 - 32 mmol/L    Anion Gap 11 10 - 20 mmol/L    Urea Nitrogen 11 6 - 23 mg/dL    Creatinine 0.59 0.50 - 1.05 mg/dL    eGFR >90 >60 mL/min/1.73m*2    Calcium 8.7 8.6 - 10.3 mg/dL    Albumin 4.1 3.4 - 5.0 g/dL    Alkaline Phosphatase 37 33 - 110 U/L    Total Protein 7.5 6.4 - 8.2 g/dL    AST 19 9 - 39 U/L    Bilirubin, Total 0.4 0.0 - 1.2 mg/dL    ALT 9 7 - 45 U/L   Troponin I, High Sensitivity   Result Value Ref Range    Troponin I, High Sensitivity <3 0 - 13 ng/L   C-Reactive Protein   Result Value Ref Range    C-Reactive Protein 0.58 <1.00 mg/dL   Sedimentation Rate   Result Value Ref Range    Sedimentation Rate 5 0 - 20 mm/h   B-Type Natriuretic Peptide   Result Value Ref Range    BNP 13 0 - 99 pg/mL   TSH with reflex to Free T4 if abnormal   Result Value Ref Range    Thyroid Stimulating Hormone 0.57 0.44 - 3.98 mIU/L   Human Chorionic Gonadotropin, Serum Quantitative   Result Value Ref Range    HCG, Beta-Quantitative <2 <5 mIU/mL   Urinalysis with  Reflex Culture and Microscopic   Result Value Ref Range    Color, Urine Colorless (N) Light-Yellow, Yellow, Dark-Yellow    Appearance, Urine Clear Clear    Specific Gravity, Urine >1.050 (N) 1.005 - 1.035    pH, Urine 7.0 5.0, 5.5, 6.0, 6.5, 7.0, 7.5, 8.0    Protein, Urine 10 (TRACE) NEGATIVE, 10 (TRACE), 20 (TRACE) mg/dL    Glucose, Urine Normal Normal mg/dL    Blood, Urine 0.5 (2+) (A) NEGATIVE    Ketones, Urine 10 (1+) (A) NEGATIVE mg/dL    Bilirubin, Urine NEGATIVE NEGATIVE    Urobilinogen, Urine Normal Normal mg/dL    Nitrite, Urine NEGATIVE NEGATIVE    Leukocyte Esterase, Urine NEGATIVE NEGATIVE   Urinalysis Microscopic   Result Value Ref Range    WBC, Urine 6-10 (A) 1-5, NONE /HPF    RBC, Urine >20 (A) NONE, 1-2, 3-5 /HPF    Squamous Epithelial Cells, Urine 1-9 (SPARSE) Reference range not established. /HPF    Bacteria, Urine 1+ (A) NONE SEEN /HPF    Mucus, Urine FEW Reference range not established. /LPF   hCG, Urine, Qualitative   Result Value Ref Range    HCG, Urine NEGATIVE NEGATIVE   Drug Screen, Urine   Result Value Ref Range    Amphetamine Screen, Urine Presumptive Negative Presumptive Negative    Barbiturate Screen, Urine Presumptive Negative Presumptive Negative    Benzodiazepines Screen, Urine Presumptive Negative Presumptive Negative    Cannabinoid Screen, Urine Presumptive Negative Presumptive Negative    Cocaine Metabolite Screen, Urine Presumptive Negative Presumptive Negative    Fentanyl Screen, Urine Presumptive Negative Presumptive Negative    Opiate Screen, Urine Presumptive Negative Presumptive Negative    Oxycodone Screen, Urine Presumptive Negative Presumptive Negative    PCP Screen, Urine Presumptive Negative Presumptive Negative    Methadone Screen, Urine Presumptive Negative Presumptive Negative      CT venogram head w and wo iv contrast  Result Date: 12/18/2024  Interpreted By:  Waldo Acosta, STUDY: CT VENOGRAM HEAD W AND WO IV CONTRAST AND POST PROCEDURE;  12/18/2024 6:38 pm    INDICATION: Signs/Symptoms:hallucinations and headaches with pain down the spine, r/o dural venous thrombosis.     COMPARISON: CT head 12/12/2024.   ACCESSION NUMBER(S): [id number]   ORDERING CLINICIAN: DERIC IVAN   TECHNIQUE: Noncontrast axial CT scan of the head performed. Subsequently, 90 ML of Omnipaque 350 was administered intravenously. Axial images of the head were reacquired time to the predominant venous phase of contrast. Coronal, sagittal, and 3-D reconstructions were provided for review.   FINDINGS: Vascular: Major intracranial venous structures opacify including superior sagittal sinus, transverse sinuses, sigmoid sinus, straight sinus, vein of Elpidio, internal cerebral veins, basal veins of Desirae. Both cavernous sinuses opacify.   There is gross patency of the zbtxke-ar-Pjsqol structures. No aneurysms identified. No vascular malformations.   Nonvascular: No intracranial mass, hydrocephalus or evidence of large evolving cortical infarction. No intracranial hemorrhage. Osseous structures are grossly intact.     1. Patent major intracranial venous structures.     MACRO: None   Signed by: Waldo Acosta 12/18/2024 7:16 PM Dictation workstation:   [id number]    XR chest 1 view  Result Date: 12/16/2024  Interpreted By:  Lizbeth Pierre, STUDY: Chest, single AP view.   INDICATION: Signs/Symptoms:near syncope.   COMPARISON: 10/24/2024     1. No acute cardiopulmonary process.   MACRO: None.   Signed by: Lizbeth Pierre 12/16/2024 7:12 PM Dictation workstation:   [id number]    CT cervical spine wo IV contrast  Result Date: 12/12/2024  Interpreted By:  Usha Wilson, STUDY: CT CERVICAL SPINE WO IV CONTRAST;  12/12/2024 3:01 pm   INDICATION: Signs/Symptoms:syncope neck pain. No inferior no warranted     COMPARISON: None.   ACCESSION NUMBER(S): [id number]   ORDERING CLINICIAN: AUGUSTUS ROGERS   TECHNIQUE: Axial CT images of the cervical spine are obtained. Axial, coronal and sagittal reconstructions  are provided for review.   FINDINGS:     Fractures: There is no evidence for an acute fracture of the cervical spine.   Vertebral Alignment: Partial fusion of C5 and C6 vertebral bodies.   Craniocervical Junction: The odontoid process and craniocervical junction are intact.   Vertebrae/Disc Spaces:  The cervical vertebral body heights are intact and the disc spaces are preserved.   Prevertebral/Paraspinal Soft Tissues: The prevertebral and paraspinal soft tissues are unremarkable.       No evidence for an acute fracture or subluxation of the cervical spine.   MACRO: None   Signed by: Usha Wilson 12/12/2024 3:15 PM Dictation workstation:   [id number]    CT head wo IV contrast  Result Date: 12/12/2024  Interpreted By:  Usha Wilson, STUDY: CT HEAD WO IV CONTRAST;  12/12/2024 3:01 pm   INDICATION: Signs/Symptoms:syncope.       No evidence of acute cortical infarct or intracranial hemorrhage.   No evidence of intracranial hemorrhage or displaced skull fracture.   MACRO: None   Signed by: Usha Wilson 12/12/2024 3:11 PM Dictation workstation:   [id number]    Holter Or Event Cardiac Monitor  Result Date: 12/2/2024  1.  Short-term Holter monitoring was performed between November 20 and November 22, 2024.  Heart rate ranged from , with an average heart rate of 86. 2.  Rare PACs were recorded.  There is no evidence of SVT or PAF. 3.  Rare PVCs were recorded.  There is no evidence of VT. 4.  There is no evidence of high-grade AV block or pauses. 5.  A total of 11 events were recorded, 3 correlating with sinus tachycardia or PVCs, and the remainder correlating with artifact.  No sustained pathologic arrhythmia was recorded.     Transthoracic Echo Complete  Result Date: 11/27/2024  CONCLUSIONS:  1. Left ventricular ejection fraction is normal, calculated by Salazar's biplane at 67%.  2. There is normal right ventricular global systolic function.  3. The left atrium is mildly dilated.  4. Right ventricular  systolic pressure is within normal limits. **        Assessment/Plan   Migraine headache  Dizziness  Near syncope  Auditory/visual hallucinations  Lupus  DVT prophylaxis    PLAN  Admission to the observation unit.  Consultation for neurology and psychiatry.  Check MRI of the brain.  Treat headache with Tylenol, Fioricet, and Toradol as needed.  Meclizine as needed and scopolamine patch have been ordered.  Continue Plaquenil.  Pharmacological DVT prophylaxis not indicated, patient low risk.  Encourage ambulation.    Hilton Carmen, DO

## 2024-12-19 NOTE — CARE PLAN
The clinical goals for the shift include remain hemodynamically stable    Problem: Pain - Adult  Goal: Verbalizes/displays adequate comfort level or baseline comfort level  Outcome: Progressing     Problem: Safety - Adult  Goal: Free from fall injury  Outcome: Progressing     Problem: Discharge Planning  Goal: Discharge to home or other facility with appropriate resources  Outcome: Progressing     Problem: Fall/Injury  Goal: Not fall by end of shift  Outcome: Progressing  Goal: Be free from injury by end of the shift  Outcome: Progressing     Problem: Pain  Goal: Walks with improved pain control throughout the shift  Outcome: Progressing  Goal: Performs ADL's with improved pain control throughout shift  Outcome: Progressing  Goal: Free from opioid side effects throughout the shift  Outcome: Progressing

## 2024-12-19 NOTE — CARE PLAN
The patient's goals for the shift include      The clinical goals for the shift include to remain HDS      Problem: Pain - Adult  Goal: Verbalizes/displays adequate comfort level or baseline comfort level  12/19/2024 1151 by Candelaria Dupree RN  Outcome: Met  12/19/2024 0834 by Candelaria Dupree RN  Outcome: Progressing     Problem: Safety - Adult  Goal: Free from fall injury  12/19/2024 1151 by Candelaria Dupree RN  Outcome: Met  12/19/2024 0834 by Candelaria Dupree RN  Outcome: Progressing     Problem: Discharge Planning  Goal: Discharge to home or other facility with appropriate resources  12/19/2024 1151 by Candelaria Dupree RN  Outcome: Met  12/19/2024 0834 by Candelaria Dupree RN  Outcome: Progressing     Problem: Fall/Injury  Goal: Not fall by end of shift  12/19/2024 1151 by Candelaria Dupree RN  Outcome: Met  12/19/2024 0834 by Candelaria Dupree RN  Outcome: Progressing  Goal: Be free from injury by end of the shift  12/19/2024 1151 by Candelaria Dupree RN  Outcome: Met  12/19/2024 0834 by Candelaria Dupree RN  Outcome: Progressing     Problem: Pain  Goal: Walks with improved pain control throughout the shift  12/19/2024 1151 by Candelaria Dupree RN  Outcome: Met  12/19/2024 0834 by Candelaria Dupree RN  Outcome: Progressing  Goal: Performs ADL's with improved pain control throughout shift  12/19/2024 1151 by Candelaria Dupree RN  Outcome: Met  12/19/2024 0834 by Candelaria Dupree RN  Outcome: Progressing  Goal: Free from opioid side effects throughout the shift  12/19/2024 1151 by Candelaria Dupree RN  Outcome: Met  12/19/2024 0834 by Candelaria Dupree RN  Outcome: Progressing

## 2024-12-19 NOTE — PROGRESS NOTES
12/19/24 0905   [facility] Disability Status   Are you deaf or do you have serious difficulty hearing? N   Are you blind or do you have serious difficulty seeing, even when wearing glasses? N   Because of a physical, mental, or emotional condition, do you have serious difficulty concentrating, remembering, or making decisions? (5 years old or older) Y  (visual & audible hallucinations)   Do you have serious difficulty walking or climbing stairs? Y  (dizziness since October)   Do you have serious difficulty dressing or bathing? N   Because of a physical, mental, or emotional condition, do you have serious difficulty doing errands alone such as visiting the doctor? N        Event Note

## 2024-12-19 NOTE — CARE PLAN
The patient's goals for the shift include      The clinical goals for the shift include remain hemodynamically stable      Problem: Pain - Adult  Goal: Verbalizes/displays adequate comfort level or baseline comfort level  Outcome: Progressing     Problem: Safety - Adult  Goal: Free from fall injury  Outcome: Progressing     Problem: Discharge Planning  Goal: Discharge to home or other facility with appropriate resources  Outcome: Progressing     Problem: Fall/Injury  Goal: Not fall by end of shift  Outcome: Progressing  Goal: Be free from injury by end of the shift  Outcome: Progressing     Problem: Pain  Goal: Walks with improved pain control throughout the shift  Outcome: Progressing  Goal: Performs ADL's with improved pain control throughout shift  Outcome: Progressing  Goal: Free from opioid side effects throughout the shift  Outcome: Progressing

## 2024-12-20 ENCOUNTER — PATIENT OUTREACH (OUTPATIENT)
Dept: PRIMARY CARE | Facility: CLINIC | Age: 24
End: 2024-12-20
Payer: COMMERCIAL

## 2024-12-20 ENCOUNTER — APPOINTMENT (OUTPATIENT)
Dept: CARDIOLOGY | Facility: HOSPITAL | Age: 24
End: 2024-12-20
Payer: COMMERCIAL

## 2024-12-20 NOTE — PROGRESS NOTES
Discharge Facility: [facility]  Discharge Diagnosis: dizziness and headache  Admission Date: 12/18/2024  Discharge Date: 12/19/2024    PCP Appointment Date: 1/2/2025  Specialist Appointment Date:    -rheum 1/2/2025  -cardiology 1/27/2025    Hospital Encounter and Summary Linked: Yes  See discharge assessment below for further details      Issues Requiring Follow-Up  Follow up with PCP    START taking these medications     sodium chloride 1,000 mg tablet; Take 1 tablet (1 g) by mouth 2 times daily (morning and late afternoon).    Engagement  Call Start Time: 1423 (12/20/2024  2:23 PM)    Medications  Medications reviewed with patient/caregiver?: Yes (12/20/2024  2:23 PM)  Is the patient having any side effects they believe may be caused by any medication additions or changes?: No (12/20/2024  2:23 PM)  Care Management Interventions: No intervention needed (12/20/2024  2:23 PM)  Prescription Comments: new: sodium chloride (12/20/2024  2:23 PM)  Is the patient taking all medications as directed (includes completed medication regime)?: Yes (12/20/2024  2:23 PM)  Medication Comments: see med list (12/20/2024  2:23 PM)    Appointments  Does the patient have a primary care provider?: Yes (12/20/2024  2:23 PM)  Care Management Interventions: Verified appointment date/time/provider (12/20/2024  2:23 PM)  Has the patient kept scheduled appointments due by today?: Yes (12/20/2024  2:23 PM)  Care Management Interventions: Advised patient to keep appointment (12/20/2024  2:23 PM)    Self Management  What is the home health agency?: none (12/20/2024  2:23 PM)  Has home health visited the patient within 72 hours of discharge?: Not applicable (12/20/2024  2:23 PM)  What Durable Medical Equipment (DME) was ordered?: n/a (12/20/2024  2:23 PM)    Patient Teaching  Does the patient have access to their discharge instructions?: Yes (12/20/2024  2:23 PM)  Care Management Interventions: Reviewed instructions with patient (12/20/2024  2:23  PM)  What is the patient's perception of their health status since discharge?: Same (12/20/2024  2:23 PM)  Is the patient/caregiver able to teach back the hierarchy of who to call/visit for symptoms/problems? PCP, Specialist, Home Health nurse, Urgent Care, ED, 911: Yes (12/20/2024  2:23 PM)  Patient/Caregiver Education Comments: see wrap up (12/20/2024  2:23 PM)    Wrap Up  Wrap Up Additional Comments: CTS spoke with patient. She was admitted to [facility] on 12/18/2024 with headache and dizziness. Discharged on 12/19/2024 with no home care needs. Patient stated that she still has a headache and some slight dizziness. It seems to be getting a little better. Reviewed medications., There were charges to meds. Added a sodium chloride tablet. No issues with obtaining medications. Understands discharge instructions. Patient is scheduled to see PCP in January 2025 . There were no available appts sooner than January. No questions or concerns at this time. (12/20/2024  2:23 PM)  Call End Time: 1428 (12/20/2024  2:23 PM)

## 2024-12-27 ENCOUNTER — APPOINTMENT (OUTPATIENT)
Dept: CARDIOLOGY | Facility: CLINIC | Age: 24
End: 2024-12-27
Payer: COMMERCIAL

## 2025-01-02 ENCOUNTER — APPOINTMENT (OUTPATIENT)
Dept: PRIMARY CARE | Facility: CLINIC | Age: 25
End: 2025-01-02
Payer: COMMERCIAL

## 2025-01-02 VITALS
OXYGEN SATURATION: 98 % | DIASTOLIC BLOOD PRESSURE: 71 MMHG | TEMPERATURE: 98.4 F | WEIGHT: 106 LBS | HEART RATE: 82 BPM | HEIGHT: 60 IN | BODY MASS INDEX: 20.81 KG/M2 | RESPIRATION RATE: 18 BRPM | SYSTOLIC BLOOD PRESSURE: 104 MMHG

## 2025-01-02 DIAGNOSIS — R42 DIZZINESS: ICD-10-CM

## 2025-01-02 DIAGNOSIS — R44.1 HALLUCINATIONS, VISUAL: ICD-10-CM

## 2025-01-02 DIAGNOSIS — R44.0 AUDITORY HALLUCINATIONS: ICD-10-CM

## 2025-01-02 DIAGNOSIS — Z86.59 H/O ANOREXIA NERVOSA: ICD-10-CM

## 2025-01-02 DIAGNOSIS — M32.9 SYSTEMIC LUPUS ERYTHEMATOSUS, UNSPECIFIED SLE TYPE, UNSPECIFIED ORGAN INVOLVEMENT STATUS (MULTI): ICD-10-CM

## 2025-01-02 DIAGNOSIS — R55 SYNCOPE AND COLLAPSE: Primary | ICD-10-CM

## 2025-01-02 PROBLEM — G90.A POTS (POSTURAL ORTHOSTATIC TACHYCARDIA SYNDROME): Status: ACTIVE | Noted: 2025-01-02

## 2025-01-02 PROCEDURE — 99214 OFFICE O/P EST MOD 30 MIN: CPT | Performed by: NURSE PRACTITIONER

## 2025-01-02 ASSESSMENT — ENCOUNTER SYMPTOMS
DIZZINESS: 1
SHORTNESS OF BREATH: 0
DYSPHORIC MOOD: 0
COUGH: 0
PALPITATIONS: 1
NERVOUS/ANXIOUS: 0
SLEEP DISTURBANCE: 1
LIGHT-HEADEDNESS: 1
APPETITE CHANGE: 1
FATIGUE: 1
UNEXPECTED WEIGHT CHANGE: 1

## 2025-01-02 NOTE — PROGRESS NOTES
Subjective   Essence Almodovar is a 24 y.o. female who presents for Dizziness and Nausea.    HPI  Review of Systems   Constitutional:  Positive for appetite change (decreased), fatigue and unexpected weight change (weight loss).   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    Cardiovascular:  Positive for palpitations. Negative for chest pain.   Musculoskeletal:  Positive for arthralgias.   Neurological:  Positive for dizziness, syncope and light-headedness.   Psychiatric/Behavioral:  Positive for hallucinations (auditory and visual since approx July or August 2024 per pt.) and sleep disturbance. Negative for dysphoric mood and suicidal ideas. The patient is not nervous/anxious.    Objective     Physical Exam  Vitals reviewed.   Constitutional:       Appearance: Normal appearance. She is not ill-appearing.   HENT:      Head: Normocephalic.   Eyes:      Conjunctiva/sclera: Conjunctivae normal.   Cardiovascular:      Rate and Rhythm: Normal rate and regular rhythm.      Pulses: Normal pulses.      Heart sounds: Normal heart sounds. No murmur heard.  Pulmonary:      Effort: Pulmonary effort is normal.      Breath sounds: Normal breath sounds.   Abdominal:      General: Bowel sounds are normal.      Palpations: Abdomen is soft.   Musculoskeletal:      Cervical back: Neck supple.      Right lower leg: No edema.      Left lower leg: No edema.   Skin:     General: Skin is warm and dry.   Neurological:      General: No focal deficit present.      Mental Status: She is alert and oriented to person, place, and time.      Motor: No weakness.   Psychiatric:         Attention and Perception: Attention normal. She does not perceive auditory or visual hallucinations.         Mood and Affect: Mood normal. Mood is not anxious or depressed.         Speech: Speech normal.         Behavior: Behavior normal. Behavior is cooperative.          Thought Content: Thought content normal. Thought content is not paranoid. Thought content does not include homicidal or suicidal ideation.         Cognition and Memory: Cognition normal.         Judgment: Judgment is not impulsive.   /71   Pulse 82   Temp 36.9 °C (98.4 °F)   Resp 18   Ht 1.524 m (5')   Wt 48.1 kg (106 lb)   SpO2 98%   BMI 20.70 kg/m²     Assessment/Plan     Problem List Items Addressed This Visit       SLE (systemic lupus erythematosus) (Multi)    Overview     Managed w/ Plaquenil 200 mg daily since 2/2024.  Follows w/ Dr. Jyoti Hansen, Rheumatology [facility]         Current Assessment & Plan     Pt. Saw her rheumatologist earlier today and was referred to ophthalmology for exam. Rheumatology note reviewed, and Dr. Hansen is aware of patient's visual and auditory hallucinations.         Syncope and collapse - Primary    Overview     Multiple ER visits starting mid-October, 2024 for c/o dizziness, near-syncope, and syncope with collapse. Has seen Dr. Silva for cardiology. Echo completed 11/27/2024 showed EF 67% and no valvular abnormalities or pericardial effusion. 48 hour monitor study showed rare PACs and PVCs but no sustained pathologic arrhythmias.  Dr. Silva suspects POTS and referred to EP.          Current Assessment & Plan     Pt. Scheduled w/ EP Dr. Ott 1/7/2025. She was also started on sodium chloride 1 gm tablets at most recent ER visit at [facility]. Follow-up w/ EP as ordered by cardiology. Pt. Was also referred to neuromuscular specialist through [facility] by her rheumatologist, Dr. Jyoti Hansen at [facility], for further evaluation.         Relevant Orders    Referral to Neurology    Dizziness    Hallucinations, visual    Overview     Pt. Admits to visual and auditory hallucinations for several months (onset approx. 6/2024).         Current Assessment & Plan     Unclear etiology, may be a neurological manifestation of her lupus, adverse effect of  hydroxychloroquine, or an underlying psychiatric disorder. Rheumatology is aware of the hallucinations and has referred pt to a neuromuscular specialist at [facility]. Pt. Also referred to psychiatry through  and also was referred to psychiatry at [facility], and she will go to whichever can get her on the schedule first. She is also scheduled w/ ophthalmology on 1/17/2025.         Relevant Orders    Referral to Psychiatry    Auditory hallucinations    Overview     Pt. Admits to visual and auditory hallucinations for several months (onset approx. 6/2024).         Current Assessment & Plan     As described above, etiology has not yet been determined. Referred to psychiatry for eval. Will consider adding antipsychotic if symptoms persist and if          Relevant Orders    Referral to Psychiatry    H/O anorexia nervosa    Overview     This was during HS. She did not have formal treatment at the time.

## 2025-01-02 NOTE — ASSESSMENT & PLAN NOTE
Suspected POTS per Dr. Silva (cardiology). Has appt w/ EP Dr. Ott at [facility] 1/7/2025.

## 2025-01-04 PROBLEM — Z86.59 H/O ANOREXIA NERVOSA: Status: ACTIVE | Noted: 2025-01-04

## 2025-01-04 ASSESSMENT — ENCOUNTER SYMPTOMS
ARTHRALGIAS: 1
HALLUCINATIONS: 1

## 2025-01-04 NOTE — ASSESSMENT & PLAN NOTE
Pt. Scheduled w/ EP Dr. Ott 1/7/2025. She was also started on sodium chloride 1 gm tablets at most recent ER visit at [facility]. Follow-up w/ EP as ordered by cardiology. Pt. Was also referred to neuromuscular specialist through [facility] by her rheumatologist, Dr. Jyoti Hansen at [facility], for further evaluation.

## 2025-01-04 NOTE — ASSESSMENT & PLAN NOTE
Pt. Saw her rheumatologist earlier today and was referred to ophthalmology for exam. Rheumatology note reviewed, and Dr. Hansen is aware of patient's visual and auditory hallucinations.

## 2025-01-05 NOTE — ASSESSMENT & PLAN NOTE
Unclear etiology, may be a neurological manifestation of her lupus, adverse effect of hydroxychloroquine, or an underlying psychiatric disorder. Rheumatology is aware of the hallucinations and has referred pt to a neuromuscular specialist at [facility]. Pt. Also referred to psychiatry through  and also was referred to psychiatry at [facility], and she will go to whichever can get her on the schedule first. She is also scheduled w/ ophthalmology on 1/17/2025.

## 2025-01-05 NOTE — ASSESSMENT & PLAN NOTE
As described above, etiology has not yet been determined. Referred to psychiatry for eval. Would like recommendations from psychiatry prior to adding antipsychotics and may also need cardiology clearance for certain antipsychotics, such as Seroquel.

## 2025-01-07 ENCOUNTER — OFFICE VISIT (OUTPATIENT)
Dept: CARDIOLOGY | Facility: HOSPITAL | Age: 25
End: 2025-01-07
Payer: COMMERCIAL

## 2025-01-07 ENCOUNTER — PATIENT OUTREACH (OUTPATIENT)
Dept: PRIMARY CARE | Facility: CLINIC | Age: 25
End: 2025-01-07
Payer: COMMERCIAL

## 2025-01-07 VITALS
DIASTOLIC BLOOD PRESSURE: 87 MMHG | BODY MASS INDEX: 20.79 KG/M2 | HEIGHT: 60 IN | WEIGHT: 105.9 LBS | SYSTOLIC BLOOD PRESSURE: 117 MMHG | HEART RATE: 73 BPM

## 2025-01-07 DIAGNOSIS — G90.A POTS (POSTURAL ORTHOSTATIC TACHYCARDIA SYNDROME): ICD-10-CM

## 2025-01-07 DIAGNOSIS — R55 SYNCOPE AND COLLAPSE: ICD-10-CM

## 2025-01-07 DIAGNOSIS — R00.2 PALPITATIONS: ICD-10-CM

## 2025-01-07 LAB
ATRIAL RATE: 82 BPM
P AXIS: 81 DEGREES
P OFFSET: 207 MS
P ONSET: 159 MS
PR INTERVAL: 128 MS
Q ONSET: 223 MS
QRS COUNT: 14 BEATS
QRS DURATION: 82 MS
QT INTERVAL: 374 MS
QTC CALCULATION(BAZETT): 436 MS
QTC FREDERICIA: 415 MS
R AXIS: 94 DEGREES
T AXIS: 81 DEGREES
T OFFSET: 410 MS
VENTRICULAR RATE: 82 BPM

## 2025-01-07 PROCEDURE — 3008F BODY MASS INDEX DOCD: CPT | Performed by: INTERNAL MEDICINE

## 2025-01-07 PROCEDURE — 99214 OFFICE O/P EST MOD 30 MIN: CPT | Performed by: INTERNAL MEDICINE

## 2025-01-07 PROCEDURE — 93005 ELECTROCARDIOGRAM TRACING: CPT | Performed by: INTERNAL MEDICINE

## 2025-01-07 PROCEDURE — 93010 ELECTROCARDIOGRAM REPORT: CPT | Performed by: INTERNAL MEDICINE

## 2025-01-07 PROCEDURE — 99204 OFFICE O/P NEW MOD 45 MIN: CPT | Performed by: INTERNAL MEDICINE

## 2025-01-07 PROCEDURE — 1036F TOBACCO NON-USER: CPT | Performed by: INTERNAL MEDICINE

## 2025-01-07 ASSESSMENT — ENCOUNTER SYMPTOMS
LOSS OF SENSATION IN FEET: 0
DEPRESSION: 0
OCCASIONAL FEELINGS OF UNSTEADINESS: 0

## 2025-01-07 ASSESSMENT — PATIENT HEALTH QUESTIONNAIRE - PHQ9
SUM OF ALL RESPONSES TO PHQ9 QUESTIONS 1 AND 2: 0
2. FEELING DOWN, DEPRESSED OR HOPELESS: NOT AT ALL
1. LITTLE INTEREST OR PLEASURE IN DOING THINGS: NOT AT ALL

## 2025-01-07 NOTE — PROGRESS NOTES
Call regarding appt. with PCP on 1/2/2025 after hospitalization.  At time of outreach call the patient feels as if their condition has improved since last visit. Headache is better but not gone.  Reviewed the PCP appointment with the pt and addressed any questions or concerns.

## 2025-01-07 NOTE — PROGRESS NOTES
Referred by Israel Barragan MD provider found for   Chief Complaint   Patient presents with    New Patient Visit     Referral from Shira  Syncope  POTS        Essence Almodovar is a 24 y.o. year old female patient with h/o Syncope and no other cardiac issues. Was seen by cardiologist at [facility] and performed echocardiogram that showed a LVEF 67% and otherwise normal findings. Also underwent a heart monitor with PACs and PVCs of no significance. Was referred to me for evaluation of her syncope.     PMHx/PSHx: As above    FamHx: unremarkable     Allergies:  Allergies   Allergen Reactions    Pineapple Anaphylaxis, Unknown and Swelling    Amoxicillin Hives        Review of Systems    Constitutional: not feeling tired.   Eyes: no eyesight problems.   ENT: no hearing loss and no nosebleeds.   Cardiovascular: no intermittent leg claudication and as noted in HPI.   Respiratory: no chronic cough and no shortness of breath.   Gastrointestinal: no change in bowel habits and no blood in stools.   Genitourinary: no urinary frequency and no hematuria.   Skin: no skin rashes.   Neurological: no seizures and no frequent falls.   Psychiatric: no depression and not suicidal.   All other systems have been reviewed and are negative for complaint.     Outpatient Medications:  Current Outpatient Medications   Medication Instructions    diphenhydrAMINE (ALLERGY) 25 mg, Daily PRN    hydroxychloroquine (Plaquenil) 200 mg tablet 1 tablet, Daily    ondansetron ODT (ZOFRAN-ODT) 4 mg, Every 8 hours PRN    sodium chloride 1 g, oral, 2 times daily (morning and late afternoon)    Zafemy 150-35 mcg/24 hr 1 patch         Last Recorded Vitals:      12/18/2024    11:53 PM 12/19/2024     3:10 AM 12/19/2024     7:34 AM 12/19/2024     9:20 AM 12/19/2024     9:21 AM 12/19/2024     9:22 AM 1/2/2025     4:10 PM   Vitals   Systolic 108 87 105 110 112 110 104   Diastolic 68 50 65 73 70 73 71   BP Location Left arm Left arm Left arm       Heart Rate 63 50  61 61 62 65 82   Temp 36.2 °C (97.2 °F) 36.2 °C (97.2 °F) 36.7 °C (98.1 °F)    36.9 °C (98.4 °F)   Resp 17 17 17    18   Height       1.524 m (5')   Weight (lb)       106   BMI       20.7 kg/m2   BSA (m2)       1.43 m2   Visit Report       Report    Visit Vitals  Smoking Status Never        Physical Exam:  Constitutional: alert and in no acute distress.   Eyes: no erythema, swelling or discharge from the eye .   Neck: neck is supple, symmetric, trachea midline, no masses  and no thyromegaly .   Pulmonary: no increased work of breathing or signs of respiratory distress  and lungs clear to auscultation.    Cardiovascular: carotid pulses 2+ bilaterally with no bruit , JVP was normal, no thrills , regular rhythm, normal S1 and S2, no murmurs , pedal pulses 2+ bilaterally  and no edema .   Abdomen: abdomen non-tender, no masses  and no hepatomegaly .   Skin: skin warm and dry, normal skin turgor .   Psychiatric judgment and insight is normal  and oriented to person, place and time .        Assessment/Plan   Problem List Items Addressed This Visit             ICD-10-CM    Syncope and collapse R55    Palpitations R00.2    Relevant Orders    ECG 12 lead (Clinic Performed)    POTS (postural orthostatic tachycardia syndrome) [id number] Almodovar is a 24 y.o. year old female patient with h/o Syncope and no other cardiac issues. Was seen by cardiologist at [facility] and performed echocardiogram that showed a LVEF 67% and otherwise normal findings. Also underwent a heart monitor with PACs and PVCs of no significance. Was referred to me for evaluation of her syncope.   Her current ECG shows NSR with narrow QRS and HR of 82 bpm. At this time is still unclear the reason of her episodes and POTS is within the possible diagnoses. Will request a tilt table test ad follow up with the results.         Dl Ott MD  Cardiac Electrophysiology      Thank you very much for allowing me to participate in the care of this pleasant patient.  Please do not hesitate to contact me with any further questions or concerns regarding his care.    **Disclaimer: This note was dictated by speech recognition, and every effort has been made to prevent any error in transcription, however minor errors may be present**

## 2025-01-10 ENCOUNTER — OFFICE VISIT (OUTPATIENT)
Dept: URGENT CARE | Age: 25
End: 2025-01-10
Payer: COMMERCIAL

## 2025-01-10 VITALS
SYSTOLIC BLOOD PRESSURE: 117 MMHG | TEMPERATURE: 97.6 F | RESPIRATION RATE: 16 BRPM | OXYGEN SATURATION: 98 % | HEART RATE: 59 BPM | DIASTOLIC BLOOD PRESSURE: 70 MMHG

## 2025-01-10 DIAGNOSIS — Z20.822 ENCOUNTER FOR LABORATORY TESTING FOR COVID-19 VIRUS: Primary | ICD-10-CM

## 2025-01-10 DIAGNOSIS — R05.1 ACUTE COUGH: ICD-10-CM

## 2025-01-10 DIAGNOSIS — J34.89 SINUS PRESSURE: ICD-10-CM

## 2025-01-10 DIAGNOSIS — R09.81 NASAL CONGESTION: ICD-10-CM

## 2025-01-10 LAB
POC BINAX EXPIRATION: NORMAL
POC BINAX NOW COVID SERIAL NUMBER: NORMAL
POC RAPID INFLUENZA A: NEGATIVE
POC RAPID INFLUENZA B: NEGATIVE
POC SARS-COV-2 AG BINAX: NORMAL

## 2025-01-10 RX ORDER — AZITHROMYCIN 250 MG/1
TABLET, FILM COATED ORAL
Qty: 6 TABLET | Refills: 0 | Status: SHIPPED | OUTPATIENT
Start: 2025-01-10 | End: 2025-01-15

## 2025-01-10 RX ORDER — CETIRIZINE HYDROCHLORIDE 10 MG/1
10 TABLET ORAL DAILY
Qty: 30 TABLET | Refills: 0 | Status: SHIPPED | OUTPATIENT
Start: 2025-01-10 | End: 2025-02-09

## 2025-01-10 RX ORDER — FLUTICASONE PROPIONATE 50 MCG
1 SPRAY, SUSPENSION (ML) NASAL 2 TIMES DAILY
Qty: 16 G | Refills: 0 | Status: SHIPPED | OUTPATIENT
Start: 2025-01-10 | End: 2025-02-09

## 2025-01-10 ASSESSMENT — PATIENT HEALTH QUESTIONNAIRE - PHQ9
1. LITTLE INTEREST OR PLEASURE IN DOING THINGS: NOT AT ALL
2. FEELING DOWN, DEPRESSED OR HOPELESS: NOT AT ALL
SUM OF ALL RESPONSES TO PHQ9 QUESTIONS 1 AND 2: 0

## 2025-01-10 NOTE — PROGRESS NOTES
HPI:  Patient states that for the past 3 days she has congestion, dry cough, ST.  Pt states that she has no smell or taste for a few days.  No cp or sob. No n/v/diarrhea.  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      ROS:  No fever  No cp  No sob  No ear pain    PE:    A&O x3  NCAT  PERRLA, EOMI  TM clear bl  No pharyngeal erythema, +pnd  +boggy mucosa  Mild tndop over maxillary sinuses  RRR  CTAB  MOEx4  No focal deficit  Judgement normal  +submandibular nodes    Results:   Covid-  Flu-    A/P:   Nasal congestion  Cough  Sinus pressure    Increase fluids.  Rest.  If no improvement in 5 days start antibiotics.  Eat yogurt and take probiotics when on medication.  Tylenol as needed for pain.  Keep a diary of symptoms.  Recheck with your doctor in a week if no improvement. Go to the ER if starts getting worse.

## 2025-01-27 ENCOUNTER — APPOINTMENT (OUTPATIENT)
Dept: CARDIOLOGY | Facility: HOSPITAL | Age: 25
End: 2025-01-27
Payer: COMMERCIAL

## 2025-02-05 ENCOUNTER — HOSPITAL ENCOUNTER (OUTPATIENT)
Dept: NEUROLOGY | Facility: HOSPITAL | Age: 25
Discharge: HOME | End: 2025-02-05
Payer: COMMERCIAL

## 2025-02-05 DIAGNOSIS — R55 SYNCOPE AND COLLAPSE: ICD-10-CM

## 2025-02-05 PROCEDURE — 95923 AUTONOMIC NRV SYST FUNJ TEST: CPT | Performed by: STUDENT IN AN ORGANIZED HEALTH CARE EDUCATION/TRAINING PROGRAM

## 2025-02-05 PROCEDURE — 95924 ANS PARASYMP & SYMP W/TILT: CPT | Performed by: STUDENT IN AN ORGANIZED HEALTH CARE EDUCATION/TRAINING PROGRAM

## 2025-02-06 ENCOUNTER — PATIENT OUTREACH (OUTPATIENT)
Dept: PRIMARY CARE | Facility: CLINIC | Age: 25
End: 2025-02-06
Payer: COMMERCIAL

## 2025-02-07 ENCOUNTER — OFFICE VISIT (OUTPATIENT)
Dept: PRIMARY CARE | Facility: CLINIC | Age: 25
End: 2025-02-07
Payer: COMMERCIAL

## 2025-02-07 VITALS
HEIGHT: 60 IN | OXYGEN SATURATION: 98 % | SYSTOLIC BLOOD PRESSURE: 114 MMHG | RESPIRATION RATE: 17 BRPM | HEART RATE: 77 BPM | DIASTOLIC BLOOD PRESSURE: 76 MMHG | BODY MASS INDEX: 20.81 KG/M2 | TEMPERATURE: 98.1 F | WEIGHT: 106 LBS

## 2025-02-07 DIAGNOSIS — B96.89 ACUTE BACTERIAL SINUSITIS: ICD-10-CM

## 2025-02-07 DIAGNOSIS — R05.3 PERSISTENT COUGH FOR 3 WEEKS OR LONGER: Primary | ICD-10-CM

## 2025-02-07 DIAGNOSIS — J01.90 ACUTE BACTERIAL SINUSITIS: ICD-10-CM

## 2025-02-07 DIAGNOSIS — R11.0 NAUSEA: ICD-10-CM

## 2025-02-07 PROCEDURE — 99214 OFFICE O/P EST MOD 30 MIN: CPT | Performed by: NURSE PRACTITIONER

## 2025-02-07 RX ORDER — ONDANSETRON 4 MG/1
4 TABLET, ORALLY DISINTEGRATING ORAL EVERY 8 HOURS PRN
Qty: 20 TABLET | Refills: 0 | Status: SHIPPED | OUTPATIENT
Start: 2025-02-07 | End: 2025-02-14

## 2025-02-07 RX ORDER — BROMPHENIRAMINE MALEATE, PSEUDOEPHEDRINE HYDROCHLORIDE, AND DEXTROMETHORPHAN HYDROBROMIDE 2; 30; 10 MG/5ML; MG/5ML; MG/5ML
10 SYRUP ORAL 4 TIMES DAILY PRN
Qty: 240 ML | Refills: 0 | Status: SHIPPED | OUTPATIENT
Start: 2025-02-07 | End: 2025-02-17

## 2025-02-07 RX ORDER — DOXYCYCLINE 100 MG/1
100 CAPSULE ORAL 2 TIMES DAILY
Qty: 14 CAPSULE | Refills: 0 | Status: SHIPPED | OUTPATIENT
Start: 2025-02-07 | End: 2025-02-14

## 2025-02-07 ASSESSMENT — ENCOUNTER SYMPTOMS
NAUSEA: 1
WHEEZING: 0
TROUBLE SWALLOWING: 0
COUGH: 1
SORE THROAT: 1
SINUS PRESSURE: 1
DIARRHEA: 0
PALPITATIONS: 0
RHINORRHEA: 1
FATIGUE: 1
SHORTNESS OF BREATH: 0

## 2025-02-07 NOTE — PROGRESS NOTES
Subjective   Essence Almodovar is a 24 y.o. female who presents for URI.    HPI The patient was seen in an urgent care on 1/10/2025 for URI symptoms and was negative for COVID-19 and flu A or B at that time. She did complete a Z-pack which temporarily improved her sinus congestion, but the cough never improved and is now worsening. She now has a recurrence of rhinitis, rhinorrhea, sneezing, mild ST, right ear pain.    Review of Systems   Constitutional:  Positive for fatigue.   HENT:  Positive for congestion, ear pain (right ear, sharp pain), postnasal drip, rhinorrhea, sinus pressure, sneezing and sore throat. Negative for ear discharge and trouble swallowing.    Respiratory:  Positive for cough. Negative for shortness of breath and wheezing.    Cardiovascular:  Negative for chest pain and palpitations.   Gastrointestinal:  Positive for nausea. Negative for diarrhea.   Objective     Physical Exam  Vitals reviewed.   Constitutional:       General: She is in acute distress.      Appearance: Normal appearance. She is normal weight. She is ill-appearing. She is not toxic-appearing.   HENT:      Head: Normocephalic.      Right Ear: Ear canal and external ear normal. No drainage or tenderness. A middle ear effusion is present. No mastoid tenderness. No hemotympanum. Tympanic membrane is not perforated or erythematous.      Left Ear: Ear canal and external ear normal. No drainage or tenderness.  No middle ear effusion. No mastoid tenderness. No hemotympanum. Tympanic membrane is not perforated or erythematous.      Nose: Congestion and rhinorrhea present.      Right Turbinates: Swollen. Not pale.      Left Turbinates: Not swollen or pale.      Right Sinus: Maxillary sinus tenderness (greater on right than left) present. No frontal sinus tenderness.      Left Sinus: Maxillary sinus tenderness present. No frontal sinus tenderness.      Mouth/Throat:      Mouth: Mucous membranes are moist.      Pharynx: Posterior oropharyngeal  erythema present.   Eyes:      Conjunctiva/sclera: Conjunctivae normal.   Cardiovascular:      Rate and Rhythm: Normal rate and regular rhythm.      Pulses: Normal pulses.      Heart sounds: No murmur heard.  Pulmonary:      Effort: Pulmonary effort is normal. No tachypnea or respiratory distress.      Breath sounds: No decreased air movement. No wheezing, rhonchi or rales.      Comments: Persistent dry cough throughout her visit.  Abdominal:      General: Bowel sounds are normal.      Palpations: Abdomen is soft.   Musculoskeletal:      Cervical back: Neck supple.      Right lower leg: No edema.      Left lower leg: No edema.   Lymphadenopathy:      Cervical: Cervical adenopathy present.   Skin:     General: Skin is warm and dry.   Neurological:      General: No focal deficit present.      Mental Status: She is alert and oriented to person, place, and time.   Psychiatric:         Mood and Affect: Mood normal.         Thought Content: Thought content normal.     /76   Pulse 77   Temp 36.7 °C (98.1 °F)   Resp 17   Ht 1.524 m (5')   Wt 48.1 kg (106 lb)   SpO2 98%   BMI 20.70 kg/m²     Assessment/Plan     Problem List Items Addressed This Visit    None  Visit Diagnoses       Persistent cough for 3 weeks or longer    -  Primary    Relevant Medications    brompheniramine-pseudoeph-DM 2-30-10 mg/5 mL syrup    Nausea        Relevant Medications    ondansetron ODT (Zofran-ODT) 4 mg disintegrating tablet    Acute bacterial sinusitis        Relevant Medications    doxycycline (Vibramycin) 100 mg capsule          Increase oral fluids/water, at least eight 8-oz glasses/day.  Get plenty of rest.  Cepacol lozenges and/or Chloraseptic spray PRN for sore throat. Salt water gargle or broth for comfort.  Alternate Tylenol/Ibuprofen PRN for body aches and/or fever  Saline nasal spray PRN for rhinitis.  Bromphed DM PRN for cough

## 2025-02-10 ENCOUNTER — TELEPHONE (OUTPATIENT)
Dept: PRIMARY CARE | Facility: CLINIC | Age: 25
End: 2025-02-10
Payer: COMMERCIAL

## 2025-02-10 DIAGNOSIS — J01.90 ACUTE BACTERIAL SINUSITIS: Primary | ICD-10-CM

## 2025-02-10 DIAGNOSIS — R05.3 PERSISTENT COUGH FOR 3 WEEKS OR LONGER: ICD-10-CM

## 2025-02-10 DIAGNOSIS — B96.89 ACUTE BACTERIAL SINUSITIS: Primary | ICD-10-CM

## 2025-02-10 RX ORDER — METHYLPREDNISOLONE 4 MG/1
TABLET ORAL
Qty: 21 TABLET | Refills: 0 | Status: SHIPPED | OUTPATIENT
Start: 2025-02-10 | End: 2025-02-16

## 2025-02-10 NOTE — TELEPHONE ENCOUNTER
Patient was seen on 02/07/2025 for sinusitis and persistent cough for 3+ weeks.   She has called in stating the the medication she was prescribed are not helping and feels her symptoms are worsening. She is not experiencing muscle aches, chills.  Temp is 95.

## 2025-02-17 DIAGNOSIS — G90.A POTS (POSTURAL ORTHOSTATIC TACHYCARDIA SYNDROME): ICD-10-CM

## 2025-02-27 ENCOUNTER — OFFICE VISIT (OUTPATIENT)
Dept: PRIMARY CARE | Facility: CLINIC | Age: 25
End: 2025-02-27
Payer: COMMERCIAL

## 2025-02-27 VITALS
RESPIRATION RATE: 17 BRPM | BODY MASS INDEX: 21.2 KG/M2 | DIASTOLIC BLOOD PRESSURE: 76 MMHG | HEIGHT: 60 IN | TEMPERATURE: 98.6 F | SYSTOLIC BLOOD PRESSURE: 112 MMHG | HEART RATE: 85 BPM | WEIGHT: 108 LBS | OXYGEN SATURATION: 97 %

## 2025-02-27 DIAGNOSIS — R05.3 PERSISTENT COUGH FOR 3 WEEKS OR LONGER: Primary | ICD-10-CM

## 2025-02-27 LAB
POC RAPID INFLUENZA A: NEGATIVE
POC RAPID INFLUENZA B: NEGATIVE
POC SARS-COV-2 AG BINAX: NORMAL

## 2025-02-27 PROCEDURE — 99214 OFFICE O/P EST MOD 30 MIN: CPT | Performed by: NURSE PRACTITIONER

## 2025-02-27 PROCEDURE — 87811 SARS-COV-2 COVID19 W/OPTIC: CPT | Performed by: NURSE PRACTITIONER

## 2025-02-27 PROCEDURE — 87804 INFLUENZA ASSAY W/OPTIC: CPT | Performed by: NURSE PRACTITIONER

## 2025-02-27 RX ORDER — MOXIFLOXACIN HYDROCHLORIDE 400 MG/1
400 TABLET ORAL DAILY
Qty: 7 TABLET | Refills: 0 | Status: SHIPPED | OUTPATIENT
Start: 2025-02-27 | End: 2025-02-27 | Stop reason: ENTERED-IN-ERROR

## 2025-02-27 RX ORDER — ALBUTEROL SULFATE 90 UG/1
2 INHALANT RESPIRATORY (INHALATION) EVERY 4 HOURS PRN
Qty: 8.5 G | Refills: 0 | Status: SHIPPED | OUTPATIENT
Start: 2025-02-27 | End: 2026-02-27

## 2025-02-27 RX ORDER — ONDANSETRON 4 MG/1
TABLET, ORALLY DISINTEGRATING ORAL
COMMUNITY
Start: 2025-02-08

## 2025-02-27 ASSESSMENT — ENCOUNTER SYMPTOMS
SWEATS: 1
CHILLS: 0
HEADACHES: 1
SORE THROAT: 0
COUGH: 1
FEVER: 0
SHORTNESS OF BREATH: 0

## 2025-02-27 NOTE — PROGRESS NOTES
Subjective   Essence Almodovar is a 24 y.o. female who presents for Cough.    Cough  This is a recurrent problem. Episode onset: 1 month ago. The problem has been unchanged. The problem occurs constantly. The cough is Non-productive. Associated symptoms include headaches, nasal congestion and sweats. Pertinent negatives include no chills, ear congestion, ear pain, fever, sore throat or shortness of breath. Associated symptoms comments: Sinus pain, sinus pressure.   She was last seen here for this on 2/7/2025 and was diagnosed with acute bacterial sinusitis. She was treated w/ a 7-day course of doxycycline, which did resolve the sinus concerns, but the cough persisted. The cough syrup has been helpful. She was then prescribed a medrol dose wero on 2/10/2025, which helped the sinusitis, but not the cough. The cough never improved, and 3 days ago, she developed recurrent sinus congestion.   Review of Systems   Constitutional:  Negative for chills and fever.   HENT:  Negative for ear pain and sore throat.    Respiratory:  Positive for cough. Negative for shortness of breath.    Neurological:  Positive for headaches.   Objective     Physical Exam  Vitals reviewed.   Constitutional:       General: She is not in acute distress.     Appearance: Normal appearance. She is normal weight. She is ill-appearing. She is not toxic-appearing.   HENT:      Head: Normocephalic.      Right Ear: Ear canal and external ear normal. No drainage or tenderness. A middle ear effusion is present. There is no impacted cerumen. No mastoid tenderness. No hemotympanum. Tympanic membrane is not perforated or erythematous.      Left Ear: Ear canal and external ear normal. No drainage or tenderness.  No middle ear effusion. There is no impacted cerumen. No mastoid tenderness. No hemotympanum. Tympanic membrane is not perforated or erythematous.      Nose: Congestion present. No rhinorrhea.      Right Turbinates: Swollen. Not pale.      Left Turbinates:  Not swollen or pale.      Right Sinus: Maxillary sinus tenderness (greater on right than left) present. No frontal sinus tenderness.      Left Sinus: Maxillary sinus tenderness present. No frontal sinus tenderness.      Mouth/Throat:      Mouth: Mucous membranes are moist.      Pharynx: No posterior oropharyngeal erythema.   Eyes:      Conjunctiva/sclera: Conjunctivae normal.   Cardiovascular:      Rate and Rhythm: Normal rate and regular rhythm.      Pulses: Normal pulses.      Heart sounds: No murmur heard.  Pulmonary:      Effort: Pulmonary effort is normal. No tachypnea or respiratory distress.      Breath sounds: No decreased air movement. Wheezing present. No rhonchi or rales.      Comments: Persistent dry cough throughout her visit.  Abdominal:      General: Bowel sounds are normal.      Palpations: Abdomen is soft.   Musculoskeletal:      Cervical back: Neck supple.      Right lower leg: No edema.      Left lower leg: No edema.   Lymphadenopathy:      Cervical: Cervical adenopathy present.   Skin:     General: Skin is warm and dry.   Neurological:      General: No focal deficit present.      Mental Status: She is alert and oriented to person, place, and time.   Psychiatric:         Mood and Affect: Mood normal.         Thought Content: Thought content normal.         /76   Pulse 85   Temp 37 °C (98.6 °F)   Resp 17   Ht 1.524 m (5')   Wt 49 kg (108 lb)   SpO2 97%   BMI 21.09 kg/m²     Assessment/Plan     Problem List Items Addressed This Visit    None  Visit Diagnoses       Persistent cough for 3 weeks or longer    -  Primary    Relevant Medications    albuterol (ProAir HFA) 90 mcg/actuation inhaler    Other Relevant Orders    POCT BinaxNOW Covid-19 Ag Card manually resulted (Completed)    POCT Influenza A/B manually resulted (Completed)    XR chest 2 views    CBC and Auto Differential    Basic Metabolic Panel    QUEST MISCELLANEOUS TEST (ROOM TEMPERATURE)    Respiratory Allergy Profile IgE     Food Allergy Profile IgE        POCT COVID-19 and influenza A&B are both negative.

## 2025-02-28 ENCOUNTER — HOSPITAL ENCOUNTER (OUTPATIENT)
Dept: RADIOLOGY | Facility: CLINIC | Age: 25
Discharge: HOME | End: 2025-02-28
Payer: COMMERCIAL

## 2025-02-28 DIAGNOSIS — R05.3 PERSISTENT COUGH FOR 3 WEEKS OR LONGER: ICD-10-CM

## 2025-02-28 PROCEDURE — 71046 X-RAY EXAM CHEST 2 VIEWS: CPT

## 2025-03-01 PROBLEM — R05.3 CHRONIC COUGH: Status: ACTIVE | Noted: 2025-03-01

## 2025-03-01 LAB
A ALTERNATA IGE QN: NORMAL
A ALTERNATA IGE RAST: NORMAL
A FUMIGATUS IGE QN: NORMAL
A FUMIGATUS IGE RAST: NORMAL
ALMOND IGE QN: NORMAL
ALMOND IGE RAST: NORMAL
ANION GAP SERPL CALCULATED.4IONS-SCNC: 13 MMOL/L (CALC) (ref 7–17)
BASOPHILS # BLD AUTO: 69 CELLS/UL (ref 0–200)
BASOPHILS NFR BLD AUTO: 0.8 %
BERMUDA GRASS IGE QN: NORMAL
BERMUDA GRASS IGE RAST: NORMAL
BOXELDER IGE QN: NORMAL
BOXELDER IGE RAST: NORMAL
BUN SERPL-MCNC: 10 MG/DL (ref 7–25)
BUN/CREAT SERPL: NORMAL (CALC) (ref 6–22)
C HERBARUM IGE QN: NORMAL
C HERBARUM IGE RAST: NORMAL
CALCIUM SERPL-MCNC: 9 MG/DL (ref 8.6–10.2)
CALIF WALNUT POLN IGE QN: NORMAL
CALIF WALNUT POLN IGE RAST: NORMAL
CASHEW NUT IGE QN: NORMAL
CASHEW NUT IGE RAST: NORMAL
CAT DANDER IGE QN: NORMAL
CAT DANDER IGE RAST: NORMAL
CHLORIDE SERPL-SCNC: 104 MMOL/L (ref 98–110)
CMN PIGWEED IGE QN: NORMAL
CMN PIGWEED IGE RAST: NORMAL
CO2 SERPL-SCNC: 24 MMOL/L (ref 20–32)
CODFISH IGE QN: NORMAL
CODFISH IGE RAST: NORMAL
COMMON RAGWEED IGE QN: NORMAL
COMMON RAGWEED IGE RAST: NORMAL
COTTONWOOD IGE QN: NORMAL
COTTONWOOD IGE RAST: NORMAL
CREAT SERPL-MCNC: 0.74 MG/DL (ref 0.5–0.96)
D FARINAE IGE QN: NORMAL
D FARINAE IGE RAST: NORMAL
D PTERONYSS IGE QN: NORMAL
D PTERONYSS IGE RAST: NORMAL
DOG DANDER IGE QN: NORMAL
DOG DANDER IGE RAST: NORMAL
EGFRCR SERPLBLD CKD-EPI 2021: 117 ML/MIN/1.73M2
EGG WHITE IGE QN: NORMAL
EGG WHITE IGE RAST: NORMAL
EOSINOPHIL # BLD AUTO: 258 CELLS/UL (ref 15–500)
EOSINOPHIL NFR BLD AUTO: 3 %
ERYTHROCYTE [DISTWIDTH] IN BLOOD BY AUTOMATED COUNT: 11.7 % (ref 11–15)
GLUCOSE SERPL-MCNC: 99 MG/DL (ref 65–99)
HAZELNUT IGE QN: NORMAL
HAZELNUT IGE RAST: NORMAL
HCT VFR BLD AUTO: 37.6 % (ref 35–45)
HGB BLD-MCNC: 12.4 G/DL (ref 11.7–15.5)
IGE SERPL-ACNC: NORMAL [IU]/L
JOHNSON GRASS IGE QN: NORMAL
JOHNSON GRASS IGE RAST: NORMAL
LONDON PLANE IGE QN: NORMAL
LONDON PLANE IGE RAST: NORMAL
LYMPHOCYTES # BLD AUTO: 3724 CELLS/UL (ref 850–3900)
LYMPHOCYTES NFR BLD AUTO: 43.3 %
MCH RBC QN AUTO: 30.2 PG (ref 27–33)
MCHC RBC AUTO-ENTMCNC: 33 G/DL (ref 32–36)
MCV RBC AUTO: 91.7 FL (ref 80–100)
MILK IGE QN: NORMAL
MILK IGE RAST: NORMAL
MONOCYTES # BLD AUTO: 559 CELLS/UL (ref 200–950)
MONOCYTES NFR BLD AUTO: 6.5 %
MOUSE URINE PROT IGE QN: NORMAL
MOUSE URINE PROT IGE RAST: NORMAL
MT JUNIPER IGE QN: NORMAL
MT JUNIPER IGE RAST: NORMAL
NEUTROPHILS # BLD AUTO: 3990 CELLS/UL (ref 1500–7800)
NEUTROPHILS NFR BLD AUTO: 46.4 %
P NOTATUM IGE QN: NORMAL
P NOTATUM IGE RAST: NORMAL
PEANUT IGE QN: NORMAL
PEANUT IGE RAST: NORMAL
PECAN/HICK TREE IGE QN: NORMAL
PECAN/HICK TREE IGE RAST: NORMAL
PLATELET # BLD AUTO: 331 THOUSAND/UL (ref 140–400)
PMV BLD REES-ECKER: 9.6 FL (ref 7.5–12.5)
POTASSIUM SERPL-SCNC: 4.4 MMOL/L (ref 3.5–5.3)
RBC # BLD AUTO: 4.1 MILLION/UL (ref 3.8–5.1)
REF LAB TEST REF RANGE: NORMAL
ROACH IGE QN: NORMAL
ROACH IGE RAST: NORMAL
SALMON IGE QN: NORMAL
SALMON IGE RAST: NORMAL
SALTWORT IGE QN: NORMAL
SALTWORT IGE RAST: NORMAL
SCALLOP IGE QN: NORMAL
SCALLOP IGE RAST: NORMAL
SESAME SEED IGE QN: NORMAL
SESAME SEED IGE RAST: NORMAL
SHEEP SORREL IGE QN: NORMAL
SHEEP SORREL IGE RAST: NORMAL
SHRIMP IGE QN: NORMAL
SHRIMP IGE RAST: NORMAL
SILVER BIRCH IGE QN: NORMAL
SILVER BIRCH IGE RAST: NORMAL
SODIUM SERPL-SCNC: 141 MMOL/L (ref 135–146)
SOYBEAN IGE QN: NORMAL
SOYBEAN IGE RAST: NORMAL
TIMOTHY IGE QN: NORMAL
TIMOTHY IGE RAST: NORMAL
TUNA IGE QN: NORMAL
TUNA IGE RAST: NORMAL
WALNUT IGE QN: NORMAL
WALNUT IGE RAST: NORMAL
WBC # BLD AUTO: 8.6 THOUSAND/UL (ref 3.8–10.8)
WHEAT IGE QN: NORMAL
WHEAT IGE RAST: NORMAL
WHITE ASH IGE QN: NORMAL
WHITE ASH IGE RAST: NORMAL
WHITE ELM IGE QN: NORMAL
WHITE ELM IGE RAST: NORMAL
WHITE MULBERRY IGE QN: NORMAL
WHITE MULBERRY IGE RAST: NORMAL
WHITE OAK IGE QN: NORMAL
WHITE OAK IGE RAST: NORMAL

## 2025-03-04 LAB
A ALTERNATA IGE QN: 4.82 KU/L
A ALTERNATA IGE RAST: 3
A FUMIGATUS IGE QN: <0.1 KU/L
A FUMIGATUS IGE RAST: 0
ALMOND IGE QN: <0.1 KU/L
ALMOND IGE RAST: 0
ANION GAP SERPL CALCULATED.4IONS-SCNC: 13 MMOL/L (CALC) (ref 7–17)
BASOPHILS # BLD AUTO: 69 CELLS/UL (ref 0–200)
BASOPHILS NFR BLD AUTO: 0.8 %
BERMUDA GRASS IGE QN: 0.21 KU/L
BERMUDA GRASS IGE RAST: ABNORMAL
BOXELDER IGE QN: <0.1 KU/L
BOXELDER IGE RAST: 0
BUN SERPL-MCNC: 10 MG/DL (ref 7–25)
BUN/CREAT SERPL: NORMAL (CALC) (ref 6–22)
C HERBARUM IGE QN: <0.1 KU/L
C HERBARUM IGE RAST: 0
CALCIUM SERPL-MCNC: 9 MG/DL (ref 8.6–10.2)
CALIF WALNUT POLN IGE QN: 1.3 KU/L
CALIF WALNUT POLN IGE RAST: 2
CASHEW NUT IGE QN: <0.1 KU/L
CASHEW NUT IGE RAST: 0
CAT (RFEL D) 1 IGE QN: 1.36 KU/L
CAT (RFEL D) 4 IGE QN: <0.1 KU/L
CAT (RFEL D) 7 IGE QN: 0.11 KU/L
CAT DANDER IGE QN: 1.31 KU/L
CAT DANDER IGE RAST: 2
CHLORIDE SERPL-SCNC: 104 MMOL/L (ref 98–110)
CMN PIGWEED IGE QN: 0.17 KU/L
CMN PIGWEED IGE RAST: ABNORMAL
CO2 SERPL-SCNC: 24 MMOL/L (ref 20–32)
CODFISH IGE QN: <0.1 KU/L
CODFISH IGE RAST: 0
COMMON RAGWEED IGE QN: 5.04 KU/L
COMMON RAGWEED IGE RAST: 3
COTTONWOOD IGE QN: <0.1 KU/L
COTTONWOOD IGE RAST: 0
CREAT SERPL-MCNC: 0.74 MG/DL (ref 0.5–0.96)
D FARINAE IGE QN: 3.25 KU/L
D FARINAE IGE RAST: 2
D PTERONYSS IGE QN: 3.09 KU/L
D PTERONYSS IGE RAST: 2
DOG (RCAN F) 1 IGE QN: 0.27 KU/L
DOG (RCAN F) 2 IGE QN: <0.1 KU/L
DOG (RCAN F) 4 IGE QN: <0.1 KU/L
DOG (RCAN F) 5 IGE QN: <0.1 KU/L
DOG (RCAN F) 6 IGE QN: <0.1 KU/L
DOG DANDER IGE QN: 0.22 KU/L
DOG DANDER IGE RAST: ABNORMAL
EGFRCR SERPLBLD CKD-EPI 2021: 117 ML/MIN/1.73M2
EGG WHITE IGE QN: <0.1 KU/L
EGG WHITE IGE RAST: 0
EOSINOPHIL # BLD AUTO: 258 CELLS/UL (ref 15–500)
EOSINOPHIL NFR BLD AUTO: 3 %
ERYTHROCYTE [DISTWIDTH] IN BLOOD BY AUTOMATED COUNT: 11.7 % (ref 11–15)
GLUCOSE SERPL-MCNC: 99 MG/DL (ref 65–99)
HAZELNUT IGE QN: <0.1 KU/L
HAZELNUT IGE RAST: 0
HCT VFR BLD AUTO: 37.6 % (ref 35–45)
HGB BLD-MCNC: 12.4 G/DL (ref 11.7–15.5)
IGE SERPL-ACNC: 104 KU/L
JOHNSON GRASS IGE QN: 0.69 KU/L
JOHNSON GRASS IGE RAST: 1
LONDON PLANE IGE QN: 0.14 KU/L
LONDON PLANE IGE RAST: ABNORMAL
LYMPHOCYTES # BLD AUTO: 3724 CELLS/UL (ref 850–3900)
LYMPHOCYTES NFR BLD AUTO: 43.3 %
MCH RBC QN AUTO: 30.2 PG (ref 27–33)
MCHC RBC AUTO-ENTMCNC: 33 G/DL (ref 32–36)
MCV RBC AUTO: 91.7 FL (ref 80–100)
MILK IGE QN: <0.1 KU/L
MILK IGE RAST: 0
MONOCYTES # BLD AUTO: 559 CELLS/UL (ref 200–950)
MONOCYTES NFR BLD AUTO: 6.5 %
MOUSE URINE PROT IGE QN: <0.1 KU/L
MOUSE URINE PROT IGE RAST: 0
MT JUNIPER IGE QN: <0.1 KU/L
MT JUNIPER IGE RAST: 0
NEUTROPHILS # BLD AUTO: 3990 CELLS/UL (ref 1500–7800)
NEUTROPHILS NFR BLD AUTO: 46.4 %
P NOTATUM IGE QN: <0.1 KU/L
P NOTATUM IGE RAST: 0
PEANUT IGE QN: <0.1 KU/L
PEANUT IGE RAST: 0
PECAN/HICK TREE IGE QN: 3.59 KU/L
PECAN/HICK TREE IGE RAST: 3
PLATELET # BLD AUTO: 331 THOUSAND/UL (ref 140–400)
PMV BLD REES-ECKER: 9.6 FL (ref 7.5–12.5)
POTASSIUM SERPL-SCNC: 4.4 MMOL/L (ref 3.5–5.3)
QUEST CAT DANDER COMP PNL FEL D 2 (E220) IGE: <0.1 KU/L
QUEST DOG DANDER COMP PNL CAN F 3 (E221) IGE: <0.1 KU/L
RBC # BLD AUTO: 4.1 MILLION/UL (ref 3.8–5.1)
REF LAB TEST REF RANGE: NORMAL
ROACH IGE QN: 0.41 KU/L
ROACH IGE RAST: 1
SALMON IGE QN: <0.1 KU/L
SALMON IGE RAST: 0
SALTWORT IGE QN: 0.14 KU/L
SALTWORT IGE RAST: ABNORMAL
SCALLOP IGE QN: <0.1 KU/L
SCALLOP IGE RAST: 0
SESAME SEED IGE QN: 0.26 KU/L
SESAME SEED IGE RAST: ABNORMAL
SHEEP SORREL IGE QN: <0.1 KU/L
SHEEP SORREL IGE RAST: 0
SHRIMP IGE QN: 0.14 KU/L
SHRIMP IGE RAST: ABNORMAL
SILVER BIRCH IGE QN: 0.27 KU/L
SILVER BIRCH IGE RAST: ABNORMAL
SODIUM SERPL-SCNC: 141 MMOL/L (ref 135–146)
SOYBEAN IGE QN: <0.1 KU/L
SOYBEAN IGE RAST: 0
TIMOTHY IGE QN: 2.55 KU/L
TIMOTHY IGE RAST: 2
TUNA IGE QN: <0.1 KU/L
TUNA IGE RAST: 0
WALNUT IGE QN: <0.1 KU/L
WALNUT IGE RAST: 0
WBC # BLD AUTO: 8.6 THOUSAND/UL (ref 3.8–10.8)
WHEAT IGE QN: 0.17 KU/L
WHEAT IGE RAST: ABNORMAL
WHITE ASH IGE QN: 1.11 KU/L
WHITE ASH IGE RAST: 2
WHITE ELM IGE QN: 0.83 KU/L
WHITE ELM IGE RAST: 2
WHITE MULBERRY IGE QN: <0.1 KU/L
WHITE MULBERRY IGE RAST: 0
WHITE OAK IGE QN: <0.1 KU/L
WHITE OAK IGE RAST: 0

## 2025-03-05 ENCOUNTER — PATIENT MESSAGE (OUTPATIENT)
Dept: PRIMARY CARE | Facility: CLINIC | Age: 25
End: 2025-03-05
Payer: COMMERCIAL

## 2025-03-05 DIAGNOSIS — R11.0 NAUSEA: ICD-10-CM

## 2025-03-05 RX ORDER — ONDANSETRON 4 MG/1
4 TABLET, ORALLY DISINTEGRATING ORAL EVERY 8 HOURS PRN
Qty: 20 TABLET | Refills: 0 | OUTPATIENT
Start: 2025-03-05

## 2025-03-05 NOTE — PATIENT COMMUNICATION
Patient requests prescription below    Last Office Visit: 2/27/2025   Next Office Visit: Visit date not found     Requested Prescriptions     Pending Prescriptions Disp Refills    ondansetron ODT (Zofran-ODT) 4 mg disintegrating tablet 20 tablet 0     Sig: Dissolve 1 tablet (4 mg) in the mouth every 8 hours if needed for nausea or vomiting.

## 2025-03-10 ENCOUNTER — PATIENT OUTREACH (OUTPATIENT)
Dept: PRIMARY CARE | Facility: CLINIC | Age: 25
End: 2025-03-10

## 2025-03-10 ENCOUNTER — OFFICE VISIT (OUTPATIENT)
Dept: PRIMARY CARE | Facility: CLINIC | Age: 25
End: 2025-03-10
Payer: COMMERCIAL

## 2025-03-10 VITALS
TEMPERATURE: 99 F | HEART RATE: 79 BPM | SYSTOLIC BLOOD PRESSURE: 108 MMHG | WEIGHT: 107 LBS | BODY MASS INDEX: 21.01 KG/M2 | OXYGEN SATURATION: 96 % | HEIGHT: 60 IN | RESPIRATION RATE: 17 BRPM | DIASTOLIC BLOOD PRESSURE: 73 MMHG

## 2025-03-10 DIAGNOSIS — M32.9 SYSTEMIC LUPUS ERYTHEMATOSUS, UNSPECIFIED SLE TYPE, UNSPECIFIED ORGAN INVOLVEMENT STATUS (MULTI): ICD-10-CM

## 2025-03-10 DIAGNOSIS — J01.01 ACUTE RECURRENT MAXILLARY SINUSITIS: ICD-10-CM

## 2025-03-10 DIAGNOSIS — R55 SYNCOPE AND COLLAPSE: ICD-10-CM

## 2025-03-10 DIAGNOSIS — R44.0 AUDITORY HALLUCINATIONS: ICD-10-CM

## 2025-03-10 DIAGNOSIS — R05.3 CHRONIC COUGH: Primary | ICD-10-CM

## 2025-03-10 DIAGNOSIS — G90.A POTS (POSTURAL ORTHOSTATIC TACHYCARDIA SYNDROME): ICD-10-CM

## 2025-03-10 DIAGNOSIS — R11.0 NAUSEA: ICD-10-CM

## 2025-03-10 PROCEDURE — 87804 INFLUENZA ASSAY W/OPTIC: CPT | Performed by: NURSE PRACTITIONER

## 2025-03-10 PROCEDURE — 87811 SARS-COV-2 COVID19 W/OPTIC: CPT | Performed by: NURSE PRACTITIONER

## 2025-03-10 PROCEDURE — 99214 OFFICE O/P EST MOD 30 MIN: CPT | Performed by: NURSE PRACTITIONER

## 2025-03-10 RX ORDER — ONDANSETRON 4 MG/1
4 TABLET, ORALLY DISINTEGRATING ORAL EVERY 8 HOURS PRN
Qty: 20 TABLET | Refills: 0 | Status: SHIPPED | OUTPATIENT
Start: 2025-03-10 | End: 2025-03-17

## 2025-03-10 ASSESSMENT — ENCOUNTER SYMPTOMS
RHINORRHEA: 0
SORE THROAT: 1
SWEATS: 1
CHILLS: 1
FEVER: 0
HEADACHES: 1
COUGH: 1

## 2025-03-10 NOTE — PROGRESS NOTES
Subjective   Essence Almodovar is a 24 y.o. female who presents for Cough and Nausea.    Cough  This is a recurrent problem. Associated symptoms include chills, ear congestion, ear pain, headaches, nasal congestion, a sore throat and sweats. Pertinent negatives include no fever, postnasal drip or rhinorrhea. Associated symptoms comments: Sinus pressure..   Review of Systems   Constitutional:  Positive for chills. Negative for fever.   HENT:  Positive for ear pain and sore throat. Negative for postnasal drip and rhinorrhea.    Respiratory:  Positive for cough.    Neurological:  Positive for headaches.   Objective   Physical Exam  Vitals reviewed.   Constitutional:       General: She is not in acute distress.     Appearance: Normal appearance. She is not ill-appearing.   HENT:      Head: Normocephalic.      Right Ear: Tympanic membrane normal.      Left Ear: Tympanic membrane normal.      Nose: Congestion present.      Mouth/Throat:      Pharynx: Posterior oropharyngeal erythema present.   Eyes:      Conjunctiva/sclera: Conjunctivae normal.   Neck:      Thyroid: No thyroid mass, thyromegaly or thyroid tenderness.   Cardiovascular:      Rate and Rhythm: Normal rate and regular rhythm.      Pulses: Normal pulses.      Heart sounds: No murmur heard.  Pulmonary:      Effort: Pulmonary effort is normal. No tachypnea or respiratory distress.      Breath sounds: Normal breath sounds. No decreased air movement.      Comments: Persistent dry cough  Abdominal:      General: Bowel sounds are normal.      Palpations: Abdomen is soft.   Musculoskeletal:      Cervical back: Neck supple.      Right lower leg: No edema.      Left lower leg: No edema.   Lymphadenopathy:      Cervical: Cervical adenopathy present.   Skin:     General: Skin is warm and dry.   Neurological:      General: No focal deficit present.      Mental Status: She is alert and oriented to person, place, and time.   Psychiatric:         Mood and Affect: Mood normal.          Thought Content: Thought content normal.       /73   Pulse 79   Temp 37.2 °C (99 °F)   Resp 17   Ht 1.524 m (5')   Wt 48.5 kg (107 lb)   SpO2 96%   BMI 20.90 kg/m²   Assessment/Plan     Problem List Items Addressed This Visit       SLE (systemic lupus erythematosus) (Multi)    Overview     Managed w/ Plaquenil 200 mg daily since 2/2024.  Follows w/ Dr. Jyoti Hansen, Rheumatology [facility]         Syncope and collapse    Overview     Multiple ER visits starting mid-October, 2024 for c/o dizziness, near-syncope, and syncope with collapse. Has seen Dr. Silva for cardiology. Echo completed 11/27/2024 showed EF 67% and no valvular abnormalities or pericardial effusion. 48 hour monitor study showed rare PACs and PVCs but no sustained pathologic arrhythmias.  Dr. Silva suspects POTS and referred to EP. Tilt table test on 2/10/2025 w/ Dr. Ott confirmed POTS diagnosis, and pt was referred to the neurology autonomic clinic and has an appointment scheduled 6/2025.         Current Assessment & Plan     Patient has not c/o syncope or near-syncope in the last few months, but her tilt table test did confirm POTS         POTS (postural orthostatic tachycardia syndrome)    Overview     POTS confirmed by tilt table test 2/10/2025. Dr. Ott  Scheduled with [facility] 6/17/2025.          Auditory hallucinations    Overview     Pt. Admits to visual and auditory hallucinations for several months (onset approx. 6/2024).         Current Assessment & Plan     Pt. States this has been better recently. She has a referral to psychiatry, but not yet scheduled.          Chronic cough - Primary    Overview     Onset 1/7/2025. Initially thought to be infectious origin. Has failed treatment w/ doxycycline hyclate, moxifloxacin, and a medrol dose pack. Tested negative for COVID-19 and influenza A&B at symptom onset in 1/10/2025, again in 2/2025, and again 3/10/2025.  CBC w/ diff normal. Resp IgE  and Food IgE completed and does show some positive findings.          Current Assessment & Plan     This is a dry, nonproductive cough that is frequent and persistent. Has not had adventitious lung sounds. Continue Zyrtec and Flonase. Appears to have an allergic component based on Resp and Food IgE results. Referred to Allergy/Immunology. Also concern for possible pulmonary manifestation of her lupus. Will check PFTs and CT chest. Referred to pulmonology.          Relevant Orders    Referral to Allergy    Referral to Pulmonology    CT chest wo IV contrast    Complete Pulmonary Function Test (Spirometry/DLCO/Lung Volumes)    POCT Influenza A/B manually resulted    POCT BinaxNOW Covid-19 Ag Card manually resulted     Other Visit Diagnoses       Nausea        Relevant Medications    ondansetron ODT (Zofran-ODT) 4 mg disintegrating tablet    Acute recurrent maxillary sinusitis        Relevant Orders    POCT Influenza A/B manually resulted    POCT BinaxNOW Covid-19 Ag Card manually resulted        Increase oral fluids/water, at least eight 8-oz glasses/day.  Get plenty of rest.  Cepacol lozenges and/or Chloraseptic spray PRN for sore throat. Salt water gargle or broth for comfort.  Alternate Tylenol/Ibuprofen PRN for body aches and/or fever  Saline nasal spray PRN for rhinitis.  Guaifenessin/Guaifenissin DM PRN for cough  Flonase nasal spray.

## 2025-03-10 NOTE — ASSESSMENT & PLAN NOTE
This is a dry, nonproductive cough that is frequent and persistent. Has not had adventitious lung sounds. Continue Zyrtec and Flonase. Appears to have an allergic component based on Resp and Food IgE results. Referred to Allergy/Immunology. Also concern for possible pulmonary manifestation of her lupus. Will check PFTs and CT chest. Referred to pulmonology.

## 2025-03-10 NOTE — ASSESSMENT & PLAN NOTE
Pt. States this has been better recently. She has a referral to psychiatry, but not yet scheduled.

## 2025-03-10 NOTE — ASSESSMENT & PLAN NOTE
Patient has not c/o syncope or near-syncope in the last few months, but her tilt table test did confirm POTS

## 2025-03-11 LAB
POC RAPID INFLUENZA A: NEGATIVE
POC RAPID INFLUENZA B: NEGATIVE
POC SARS-COV-2 AG BINAX: NORMAL

## 2025-03-11 NOTE — PROGRESS NOTES
Pulmonary Consult    Request of Pulmonary Consult by Janelle Fry APRN-*, [unfilled] to evaluate [unfilled] for {AHGREASONSFORVISIT (Optional):11700}. I have independently interviewed and examined the patient in the office and reviewed available records.    Physician HPI (3/11/2025):    Immunization History:  Immunization History   Administered Date(s) Administered    [employer] SARS-CoV-2 Vaccination 04/08/2021    Pfizer COVID-19 vaccine, age 5y-11y (10mcg/0.3mL)(Comirnaty) 07/08/2024       Family History:  Family History   Problem Relation Name Age of Onset    No Known Problems Mother      No Known Problems Father         Social History:  Social History     Socioeconomic History    Marital status: Single   Tobacco Use    Smoking status: Never    Smokeless tobacco: Never   Substance and Sexual Activity    Alcohol use: Yes     Comment: Occasional / Every other Month.    Drug use: Never    Sexual activity: Never     Social Drivers of Health     Financial Resource Strain: Low Risk  (12/19/2024)    Overall Financial Resource Strain (CARDIA)     Difficulty of Paying Living Expenses: Not hard at all   Recent Concern: Financial Resource Strain - Medium Risk (12/18/2024)    Overall Financial Resource Strain (CARDIA)     Difficulty of Paying Living Expenses: Somewhat hard   Food Insecurity: Food Insecurity Present (12/18/2024)    Hunger Vital Sign     Worried About Running Out of Food in the Last Year: Often true     Ran Out of Food in the Last Year: Often true   Transportation Needs: No Transportation Needs (12/19/2024)    PRAPARE - Transportation     Lack of Transportation (Medical): No     Lack of Transportation (Non-Medical): No   Intimate Partner Violence: Not At Risk (12/18/2024)    Humiliation, Afraid, Rape, and Kick questionnaire     Fear of Current or Ex-Partner: No     Emotionally Abused: No     Physically Abused: No     Sexually Abused: No   Housing Stability: Low Risk  (12/19/2024)    Housing Stability  Vital Sign     Unable to Pay for Housing in the Last Year: No     Number of Times Moved in the Last Year: 1     Homeless in the Last Year: No   Recent Concern: Housing Stability - High Risk (12/18/2024)    Housing Stability Vital Sign     Unable to Pay for Housing in the Last Year: Yes     Number of Times Moved in the Last Year: 3     Homeless in the Last Year: No       Current Medications:  Current Outpatient Medications   Medication Instructions    albuterol (ProAir HFA) 90 mcg/actuation inhaler 2 puffs, inhalation, Every 4 hours PRN    cetirizine (ZYRTEC) 10 mg, oral, Daily    fluticasone (Flonase) 50 mcg/actuation nasal spray 1 spray, Each Nostril, 2 times daily, Shake gently. Before first use, prime pump. After use, clean tip and replace cap.    hydroxychloroquine (Plaquenil) 200 mg tablet 1 tablet, Daily    ondansetron ODT (ZOFRAN-ODT) 4 mg, oral, Every 8 hours PRN    Zafemy 150-35 mcg/24 hr 1 patch        Drug Allergies/Intolerances:  Allergies   Allergen Reactions    Pineapple Anaphylaxis, Unknown and Swelling    Amoxicillin Hives    Azithromycin Diarrhea     Pt reports severe diarrhea and yellowing of her skin with the last course of azithromycin that she took 1/10/2025        Review of Systems:  Review of Systems     All other review of systems are negative and/or non-contributory.    Physical Examination:  There were no vitals taken for this visit.     General: ambulated independently; no acute distress; well-nourished; work of breathing was not increased; normal vocal character  HEENT: normocephalic; anicteric sclerae; conjunctivae not injected; nasal mucosa was unremarkable; oropharynx was clear without evidence of thrush; dentition was good.  Neck: supple; no lymphadenopathy or thyromegaly.  Chest: clear to auscultation bilaterally; no chest wall deformity.  Cardiac: regular rhythm; no gallop or murmur.  Abdomen: soft; non-tender; non-distended; no hepatosplenomegaly.  Extremities: no leg edema; no  digital clubbing; 2+ pulses  Psychiatric: did not appear depressed or anxious.    Pulmonary Function Test Results     No results found for this or any previous visit from the past 365 days.      Chest Radiograph     XR chest 2 views 02/28/2025    Narrative  Interpreted By:  Krzysztof Ivey,  STUDY:  XR CHEST 2 VIEWS;  2/28/2025 2:01 pm    INDICATION:  Signs/Symptoms:persistent cough x 6 weeks.    ,R05.3 Chronic cough    COMPARISON:  12/16/2020    ACCESSION NUMBER(S):  [id number]    ORDERING CLINICIAN:  JOYCE HERNANDEZ    FINDINGS:          CARDIOMEDIASTINAL SILHOUETTE:  Cardiomediastinal silhouette is normal in size and configuration.    LUNGS:  Lungs are clear.    ABDOMEN:  No remarkable upper abdominal findings.    BONES:  There is mild scoliosis. No acute osseous changes.    Impression  1.  No evidence of acute cardiopulmonary process.        MACRO:  None    Signed by: Krzysztof Ivey 3/1/2025 9:00 AM  Dictation workstation:   [id number]      Echocardiogram     Transthoracic Echo (TTE) Complete   11/27/2024    Height: 1.524 m (5')   Weight: 49 kg (108 lb)   Blood Pressure: Not recorded    Date of Study: 11/27/24   Ordering Provider: Israel Silva MD   Clinical Indications: Atypical chest pain [R07.89 (ICD-10-CM)], Other Systemic Lupus Erythematosus With Other Organ Involvement [M32.19 (ICD-10-CM)], Chest pain, unspecified [R07.9 (ICD-10-CM)]       [facility]  Performing Staff   Cardiology: Francisco Mueller MD    Tech: Calista Padilla        Indications  Priority: Routine  Dx: Atypical chest pain [R07.89 (ICD-10-CM)]; Other systemic lupus erythematosus with other organ involvement [M32.19 (ICD-10-CM)]; Chest pain, unspecified [R07.9 (ICD-10-CM)]     PACS Images     Show images for Transthoracic Echo Complete  Interpretation Summary       [facility], [address] [place] [address]               Tel [phone] and Fax [phone]     TRANSTHORACIC ECHOCARDIOGRAM REPORT         Patient Name:       JAMEE ZUÑIGA      [facility] Physician:    [id number] Francisco Mueller MD  Study Date:         11/27/2024          Ordering Provider:    [id number] STACEY FONTENOTAR  MRN/PID:            [id number]            Fellow:  Accession#:         [id number]        Nurse:  Date of Birth/Age:  2000 / 24      Sonographer:          Calista Pimentel RDCS                      years  Gender assigned at  F                   Additional Staff:  Birth:  Height:             152.00 cm           Admit Date:           11/27/2024  Weight:             49.00 kg            Admission Status:     Outpatient  BSA / BMI:          1.43 m2 / 21.21     Encounter#:           [id number]                      kg/m2  Blood Pressure:     110/68 mmHg         Department Location:  [facility] Non                                                                Invasive     Study Type:    TRANSTHORACIC ECHO (TTE) COMPLETE  Diagnosis/ICD: Chest pain, unspecified-R07.9  Indication:    CP  CPT Code:      Echo Complete w Full Doppler-87738     Patient History:  Pertinent History: Chest Pain.     Study Detail: The following Echo studies were performed: 2D, M-Mode, Doppler and                color flow.        PHYSICIAN INTERPRETATION:  Left Ventricle: Left ventricular ejection fraction is normal, calculated by Salazar's biplane at 67%. There are no regional left ventricular wall motion abnormalities. The left ventricular cavity size is normal. Spectral Doppler shows a normal pattern of left ventricular diastolic filling.  Left Atrium: The left atrium is mildly dilated.  Right Ventricle: The right ventricle is normal in size. There is normal right ventricular global systolic function.  Right Atrium: The right atrium is normal in size.  Aortic Valve: The aortic valve is trileaflet. The aortic valve dimensionless index is 0.88. There is no  evidence of aortic valve regurgitation. The peak instantaneous gradient of the aortic valve is 7 mmHg. The mean gradient of the aortic valve is 3 mmHg.  Mitral Valve: The mitral valve is normal in structure. There is trace mitral valve regurgitation.  Tricuspid Valve: The tricuspid valve is structurally normal. There is trace tricuspid regurgitation. The Doppler estimated RVSP is within normal limits at 18.6 mmHg.  Pulmonic Valve: The pulmonic valve is structurally normal. There is physiologic pulmonic valve regurgitation.  Pericardium: There is no pericardial effusion noted.  Aorta: The aortic root is normal.  Systemic Veins: The inferior vena cava appears normal in size, with IVC inspiratory collapse greater than 50%.  In comparison to the previous echocardiogram(s): There are no prior studies on this patient for comparison purposes.        CONCLUSIONS:   1. Left ventricular ejection fraction is normal, calculated by Salazar's biplane at 67%.   2. There is normal right ventricular global systolic function.   3. The left atrium is mildly dilated.   4. Right ventricular systolic pressure is within normal limits.     QUANTITATIVE DATA SUMMARY:     2D MEASUREMENTS:          Normal Ranges:  LAs:             2.59 cm  (2.7-4.0cm)  IVSd:            0.51 cm  (0.6-1.1cm)  LVPWd:           0.57 cm  (0.6-1.1cm)  LVIDd:           4.62 cm  (3.9-5.9cm)  LVIDs:           2.87 cm  LV Mass Index:   50 g/m2  LVEDV Index:     55 ml/m2  LV % FS          37.9 %        LA VOLUME:                    Normal Ranges:  LA Vol A4C:        46.9 ml    (22+/-6mL/m2)  LA Vol A2C:        39.0 ml  LA Vol BP:         44.2 ml  LA Vol Index A4C:  32.7ml/m2  LA Vol Index A2C:  27.2 ml/m2  LA Vol Index BP:   30.8 ml/m2  LA Area A4C:       16.1 cm2  LA Area A2C:       14.2 cm2  LA Major Axis A4C: 4.7 cm  LA Major Axis A2C: 4.4 cm  LA Volume Index:   30.8 ml/m2  LA Vol A4C:        42.1 ml  LA Vol A2C:        37.6 ml  LA Vol Index BSA:  27.8 ml/m2         RA VOLUME BY A/L METHOD:            Normal Ranges:  RA Vol A4C:              27.1 ml    (8.3-19.5ml)  RA Vol Index A4C:        18.9 ml/m2  RA Area A4C:             12.5 cm2  RA Major Axis A4C:       4.9 cm        M-MODE MEASUREMENTS:         Normal Ranges:  Ao Root:             2.50 cm (2.0-3.7cm)  LAs:                 2.94 cm (2.7-4.0cm)        AORTA MEASUREMENTS:         Normal Ranges:  Ao Sinus, d:        2.80 cm (2.1-3.5cm)  Ao STJ, d:          2.30 cm (1.7-3.4cm)  Asc Ao, d:          2.40 cm (2.1-3.4cm)        LV SYSTOLIC FUNCTION BY 2D PLANIMETRY (MOD):                       Normal Ranges:  EF-A4C View:    65 % (>=55%)  EF-A2C View:    70 %  EF-Biplane:     67 %  LV EF Reported: 67 %        LV DIASTOLIC FUNCTION:             Normal Ranges:  MV Peak E:             0.80 m/s    (0.7-1.2 m/s)  MV Peak A:             0.51 m/s    (0.42-0.7 m/s)  E/A Ratio:             1.57        (1.0-2.2)  MV e'                  0.210 m/s   (>8.0)  MV lateral e'          0.24 m/s  MV medial e'           0.18 m/s  MV A Dur:              85.35 msec  E/e' Ratio:            3.83        (<8.0)  a'                     0.07 m/s  PulmV Sys Servando:         48.43 cm/s  PulmV Ng Servando:        74.10 cm/s  PulmV S/D Servando:         0.65  PulmV A Revs Servando:      29.44 cm/s  PulmV A Revs Dur:      124.57 msec        MITRAL VALVE:          Normal Ranges:  MV DT:        240 msec (150-240msec)        AORTIC VALVE:                     Normal Ranges:  AoV Vmax:                1.33 m/s (<=1.7m/s)  AoV Peak P.0 mmHg (<20mmHg)  AoV Mean PG:             3.5 mmHg (1.7-11.5mmHg)  LVOT Max Servando:            1.14 m/s (<=1.1m/s)  AoV VTI:                 24.27 cm (18-25cm)  LVOT VTI:                21.34 cm  LVOT Diameter:           1.81 cm  (1.8-2.4cm)  AoV Area, VTI:           2.26 cm2 (2.5-5.5cm2)  AoV Area,Vmax:           2.20 cm2 (2.5-4.5cm2)  AoV Dimensionless Index: 0.88        RIGHT VENTRICLE:  RV Basal 2.90 cm  RV Mid   2.50 cm  RV Major 6.0  cm  TAPSE:   23.0 mm  RV s'    0.17 m/s        TRICUSPID VALVE/RVSP:          Normal Ranges:  Peak TR Velocity:     1.97 m/s  Est. RA Pressure:     3 mmHg  RV Syst Pressure:     19 mmHg  (< 30mmHg)  IVC Diam:             1.90 cm        PULMONIC VALVE:          Normal Ranges:  PV Accel Time:  97 msec  (>120ms)  PV Max Servando:     1.1 m/s  (0.6-0.9m/s)  PV Max P.5 mmHg        Pulmonary Veins:  PulmV A Revs Dur: 124.57 msec  PulmV A Revs Servando: 29.44 cm/s  PulmV Ng Servando:   74.10 cm/s  PulmV S/D Servando:    0.65  PulmV Sys Servando:    48.43 cm/s        AORTA:  Asc Ao Diam 2.39 cm        [id number] Francisco Mueller MD  Electronically signed on 2024 at 7:53:54 PM           ** Final **       Chest CT Scan     No results found for this or any previous visit from the past 365 days.       Co-morbidities Problem List    Assessment and Plan / Recommendations:  Problem List Items Addressed This Visit    None       LUCIA ALBRECHT MA  2025

## 2025-03-17 PROBLEM — J30.9 ALLERGIC RHINITIS: Status: ACTIVE | Noted: 2025-03-17

## 2025-03-18 ENCOUNTER — CONSULT (OUTPATIENT)
Dept: ALLERGY | Facility: CLINIC | Age: 25
End: 2025-03-18
Payer: COMMERCIAL

## 2025-03-18 VITALS
HEART RATE: 70 BPM | BODY MASS INDEX: 20.51 KG/M2 | SYSTOLIC BLOOD PRESSURE: 107 MMHG | WEIGHT: 105 LBS | DIASTOLIC BLOOD PRESSURE: 62 MMHG | OXYGEN SATURATION: 98 %

## 2025-03-18 DIAGNOSIS — B97.89 LOCALIZED VIRAL SKIN INFECTION: ICD-10-CM

## 2025-03-18 DIAGNOSIS — R21 RASH: ICD-10-CM

## 2025-03-18 DIAGNOSIS — L08.9 SKIN INFECTION: ICD-10-CM

## 2025-03-18 DIAGNOSIS — R05.3 CHRONIC COUGH: Primary | ICD-10-CM

## 2025-03-18 DIAGNOSIS — L08.9 LOCALIZED VIRAL SKIN INFECTION: ICD-10-CM

## 2025-03-18 LAB
FEV1/FVC: 116 %
FEV1: 101 LITERS
FVC: 89 LITERS

## 2025-03-18 PROCEDURE — 99205 OFFICE O/P NEW HI 60 MIN: CPT | Performed by: ALLERGY & IMMUNOLOGY

## 2025-03-18 PROCEDURE — 94060 EVALUATION OF WHEEZING: CPT | Performed by: ALLERGY & IMMUNOLOGY

## 2025-03-18 RX ORDER — CEPHALEXIN 500 MG/1
500 CAPSULE ORAL 3 TIMES DAILY
Qty: 21 CAPSULE | Refills: 0 | Status: SHIPPED | OUTPATIENT
Start: 2025-03-18 | End: 2025-03-25

## 2025-03-18 RX ORDER — TRIAMCINOLONE ACETONIDE 1 MG/G
CREAM TOPICAL 2 TIMES DAILY
Qty: 453 G | Refills: 0 | Status: SHIPPED | OUTPATIENT
Start: 2025-03-18

## 2025-03-18 RX ORDER — TIOTROPIUM BROMIDE INHALATION SPRAY 3.12 UG/1
2 SPRAY, METERED RESPIRATORY (INHALATION) DAILY
Qty: 2 G | Refills: 0 | COMMUNITY
Start: 2025-03-18 | End: 2025-04-17

## 2025-03-18 ASSESSMENT — ENCOUNTER SYMPTOMS: COUGH: 1

## 2025-03-18 NOTE — PROGRESS NOTES
"  Subjective   Patient ID:   [id number]   Essence Almodovar is a 24 y.o. female who presents for Rash and Itching (NPV, rash all over body , and pt states she has had a cough since jan 2025, dry non productive ).    Chief Complaint   Patient presents with    Rash    Itching     NPV, rash all over body , and pt states she has had a cough since jan 2025, dry non productive       This is a new patient, with H/O SLE, POTS, referred by MICHAEL Schmidt, for evaluation of allergy symptoms.    Patient had onset of neck rash in Dec 2024 that was itchy.  She tried hydrocortisone and a triple antibiotic product with no improvement.      Beginning of Jan 2025, she had onset of nonproductive, tickle cough arising from her mid chest and congestion.  Her congestion resolved but cough persisted so her PCP gave her cough drops, antibiotics, Medrol Dosepak (ineffective), cough syrup (Bromfed) and albuterol with no relief of her cough but helped her breathe somewhat better.  She sometimes wakes at night coughing.   She had a CXR that was normal and a CT chest was ordered but has not been completed yet.  She also has not had a breathing test.  She had a respiratory IgE and Food IgE completed.  She was told she is allergic to wheat, sesame and shrimp. She denies any symptoms. She uses Zyrtec and Flonase.     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  She has been at her home 7 months and has no pets.    Patient had \"hives\" after having amoxicillin as a child.  She does not want testing    Review of Systems   Respiratory:  Positive for cough (mid chest, tickle).      Objective   /62   Pulse 70   Wt 47.6 kg (105 lb)   SpO2 98%   BMI 20.51 kg/m²      Physical Exam  Constitutional:       Appearance: Normal appearance.   HENT:      Head: Normocephalic and atraumatic.      Right Ear: External ear normal. There is no impacted cerumen.      Left Ear: External ear normal. There is no impacted cerumen.      Nose: " No congestion or rhinorrhea.   Eyes:      Extraocular Movements: Extraocular movements intact.      Conjunctiva/sclera: Conjunctivae normal.      Pupils: Pupils are equal, round, and reactive to light.   Cardiovascular:      Rate and Rhythm: Normal rate and regular rhythm.      Heart sounds: No murmur heard.     No friction rub. No gallop.   Pulmonary:      Effort: No respiratory distress.      Breath sounds: No wheezing, rhonchi or rales.      Comments: Coughing during exam and interview.  Skin:     General: Skin is warm and dry.      Findings: Erythema (patch on neck that is dry and flaky) present.   Neurological:      Mental Status: She is alert.   Psychiatric:         Mood and Affect: Mood normal.         Behavior: Behavior normal.     Pulmonary Functions Testing Results:    FEV1   Date Value Ref Range Status   03/18/2025 101 liters Final     FVC   Date Value Ref Range Status   03/18/2025 89 liters Final     FEV1/FVC   Date Value Ref Range Status   03/18/2025 116 % Final      Assessment/Plan     Chronic cough  She C/O of onset Jan 2025 of nonproductive, dry, tickle cough arising from mid chest.  She was sick at the time with congestion, which has resolved but cough persists despite cough drops, antibiotics, Medrol Dosepak, cough syrup (Bromfed) and albuterol.  Albuterol helped her breathe, but she has intermittent night time waking.  CXR was negative.  CT was ordered but not yet completed.    She is coughing during exam and interview today.    IO pre and post PFT is 89% but no change post bronchodilator.  This is likely a postinfectious cough or vocal cord aggravation from the coughing.  I recommend she complete her CT chest and asked her to message me when this was done.    I have her a sample of Spiriva 2 inhalations once a day. I am using Spiriva as off label use for post infectious cough.     Skin infection  She had onset of itchy, neck rash Dec 2024.  She tried hydrocortisone and a triple antibiotic product  with no improvement.     She has a patch on her neck that is dry and flaky.  I believe she has a superimposed infection.    Apply triamcinolone to neck for 2 weeks and then continue PRN.    She will complete Keflex x1 week.    By signing my name below, I, Cathy Gregory, attest that this documentation has been prepared under the direction and in the presence of Jennifer Carpenter MD.  All medical record entries made by the Scribe were at my direction and personally dictated by me. I have reviewed the chart and agree that the record accurately reflects my personal performance of the history, physical exam, discussion and plan.

## 2025-03-18 NOTE — PATIENT INSTRUCTIONS
Complete CT chest ordered by Janelle.    Please send me a message when via My Chart when you have completed your CT.    Start Spiriva 2 inhalations one time a day.    Complete Keflex one pill 3 times a day for a week.    Apply triamcinolone to neck for 2 weeks and then you may continue on an as-needed basis.    Follow up in one month to recheck rash or sooner if problems or symptoms worsen prior.

## 2025-03-18 NOTE — ASSESSMENT & PLAN NOTE
She C/O of onset Jan 2025 of nonproductive, dry, tickle cough arising from mid chest.  She was sick at the time with congestion, which has resolved but cough persists despite cough drops, antibiotics, Medrol Dosepak, cough syrup (Bromfed) and albuterol.  Albuterol helped her breathe, but she has intermittent night time waking.  CXR was negative.  CT was ordered but not yet completed.    She is coughing during exam and interview today.    IO pre and post PFT is 89% but no change post bronchodilator.  This is likely a postinfectious cough or vocal cord aggravation from the coughing.  I recommend she complete her CT chest and asked her to message me when this was done.    I have her a sample of Spiriva 2 inhalations once a day. I am using Spiriva as off label use for post infectious cough.

## 2025-03-18 NOTE — LETTER
March 18, 2025     MICHAEL Owusu  [address]  [address]  [facility] [address]    Patient: Essence Almodovar   YOB: 2000   Date of Visit: 3/18/2025       Dear MICHAEL Cox:    Thank you for referring Essence Almodovar to me for evaluation. Below are my notes for this consultation.  If you have questions, please do not hesitate to call me. I look forward to following your patient along with you.       Sincerely,     Jennifer Carpenter MD      CC: No Recipients  ______________________________________________________________________________________      Subjective  Patient ID:   [id number]   Essence Almodovar is a 24 y.o. female who presents for Rash and Itching (NPV, rash all over body , and pt states she has had a cough since jan 2025, dry non productive ).    Chief Complaint   Patient presents with   • Rash   • Itching     NPV, rash all over body , and pt states she has had a cough since jan 2025, dry non productive       This is a new patient, with H/O SLE, POTS, referred by MICHAEL Schmidt, for evaluation of allergy symptoms.    Patient had onset of neck rash in Dec 2024 that was itchy.  She tried hydrocortisone and a triple antibiotic product with no improvement.      Beginning of Jan 2025, she had onset of nonproductive, tickle cough arising from her mid chest and congestion.  Her congestion resolved but cough persisted so her PCP gave her cough drops, antibiotics, Medrol Dosepak (ineffective), cough syrup (Bromfed) and albuterol with no relief of her cough but helped her breathe somewhat better.  She sometimes wakes at night coughing.   She had a CXR that was normal and a CT chest was ordered but has not been completed yet.  She also has not had a breathing test.  She had a respiratory IgE and Food IgE completed.  She was told she is allergic to wheat, sesame and shrimp. She denies any symptoms. She uses Zyrtec and Flonase.     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  She has been at her home 7 months and has no pets.    Patient had \"hives\" after having amoxicillin as a child.  She does not want testing    Review of Systems   Respiratory:  Positive for cough (mid chest, tickle).      Objective  /62   Pulse 70   Wt 47.6 kg (105 lb)   SpO2 98%   BMI 20.51 kg/m²      Physical Exam  Constitutional:       Appearance: Normal appearance.   HENT:      Head: Normocephalic and atraumatic.      Right Ear: External ear normal. There is no impacted cerumen.      Left Ear: External ear normal. There is no impacted cerumen.      Nose: No congestion or rhinorrhea.   Eyes:      Extraocular Movements: Extraocular movements intact.      Conjunctiva/sclera: Conjunctivae normal.      Pupils: Pupils are equal, round, and reactive to light.   Cardiovascular:      Rate and Rhythm: Normal rate and regular rhythm.      Heart sounds: No murmur heard.     No friction rub. No gallop.   Pulmonary:      Effort: No respiratory distress.      Breath sounds: No wheezing, rhonchi or rales.      Comments: Coughing during exam and interview.  Skin:     General: Skin is warm and dry.      Findings: Erythema (patch on neck that is dry and flaky) present.   Neurological:      Mental Status: She is alert.   Psychiatric:         Mood and Affect: Mood normal.         Behavior: Behavior normal.     Pulmonary Functions Testing Results:    FEV1   Date Value Ref Range Status   03/18/2025 101 liters Final     FVC   Date Value Ref Range Status   03/18/2025 89 liters Final     FEV1/FVC   Date Value Ref Range Status   03/18/2025 116 % Final      Assessment/Plan    Chronic cough  She C/O of onset Jan 2025 of nonproductive, dry, tickle cough arising from mid chest.  She was sick at the time with congestion, which has resolved but cough persists despite cough drops, antibiotics, Medrol Dosepak, cough syrup (Bromfed) and albuterol.  Albuterol helped her breathe, but she has intermittent night time " waking.  CXR was negative.  CT was ordered but not yet completed.    She is coughing during exam and interview today.    IO pre and post PFT is 89% but no change post bronchodilator.  This is likely a postinfectious cough or vocal cord aggravation from the coughing.  I recommend she complete her CT chest and asked her to message me when this was done.    I have her a sample of Spiriva 2 inhalations once a day. I am using Spiriva as off label use for post infectious cough.     Skin infection  She had onset of itchy, neck rash Dec 2024.  She tried hydrocortisone and a triple antibiotic product with no improvement.     She has a patch on her neck that is dry and flaky.  I believe she has a superimposed infection.    Apply triamcinolone to neck for 2 weeks and then continue PRN.    She will complete Keflex x1 week.    By signing my name below, I, Yen Gregoryibmarybel, attest that this documentation has been prepared under the direction and in the presence of Jennifer Carpenter MD.  All medical record entries made by the Scribe were at my direction and personally dictated by me. I have reviewed the chart and agree that the record accurately reflects my personal performance of the history, physical exam, discussion and plan.

## 2025-03-18 NOTE — ASSESSMENT & PLAN NOTE
She had onset of itchy, neck rash Dec 2024.  She tried hydrocortisone and a triple antibiotic product with no improvement.     She has a patch on her neck that is dry and flaky.  I believe she has a superimposed infection.    Apply triamcinolone to neck for 2 weeks and then continue PRN.    She will complete Keflex x1 week.

## 2025-03-20 ENCOUNTER — HOSPITAL ENCOUNTER (OUTPATIENT)
Dept: RADIOLOGY | Facility: CLINIC | Age: 25
Discharge: HOME | End: 2025-03-20
Payer: COMMERCIAL

## 2025-03-20 DIAGNOSIS — R05.3 CHRONIC COUGH: ICD-10-CM

## 2025-03-20 PROCEDURE — 71250 CT THORAX DX C-: CPT

## 2025-03-23 DIAGNOSIS — R05.3 CHRONIC COUGH: Primary | ICD-10-CM

## 2025-03-23 DIAGNOSIS — J84.10 GRANULOMATOUS LUNG DISEASE (MULTI): ICD-10-CM

## 2025-03-23 DIAGNOSIS — M32.9 SYSTEMIC LUPUS ERYTHEMATOSUS, UNSPECIFIED SLE TYPE, UNSPECIFIED ORGAN INVOLVEMENT STATUS (MULTI): ICD-10-CM

## 2025-03-27 NOTE — PROGRESS NOTES
Pulmonary Consult  Request of Pulmonary Consult by Janelle Fry APRN-*, to evaluate Essence Almodovar for cough. I have independently interviewed and examined the patient in the office and reviewed available records.    Physician HPI (4/1/2025):  A 24-year-old lady with past medical history of SLE diagnosed 1 year ago maintained on Plaquenil she was referred by her primary care physician for chronic cough  She states that cough started 3 months ago which is dry and irritant is preceded by upper respiratory tract infection in January when she felt congestion and sinus discharge she received antibiotic for a week the congestion and sinus discharge improved but the cough persisted and now.  She was seen by her primary care physician who ordered albuterol inhaler then she was referred for allergist who prescribed for hair dual bronchodilator inhaler  Cough is mainly dry irritant almost all the day not associated with phlegm no fever no chest pain no hemoptysis no chest wheezes.  Her lupus manifestations are mainly fatigue joint pain and rash and eczema she feels that for the last few days she complains of some joint pain and skin exam.  She follows with a rheumatologist in [facility] Dr. Hansen   She also has history of allergic rhinitis she is maintained on Flonase nasal spray  She is allergic to pollen cats dogs and dandruff  She never diagnosed with asthma or COPD or sarcoidosis  She has connective tissue disease workup in 2025 which was negative except for lupus anticoagulant panel  She has a CT chest that shows calcified nodule and small calcified lymph node  PFT no bronchodilator response nor obstruction    Social history   she works as patient lives on [facility] she is a non-smoker no vaping or marijuana  She lives by herself.  She moved 7 months ago from [place]  Her father lives in [place] area where she visits him every few weeks    Ros:  No GERD symptoms  Positive for joint pain but no  swelling  Eczema neck and arm      Immunization History:  Immunization History   Administered Date(s) Administered    [employer] SARS-CoV-2 Vaccination 04/08/2021    Pfizer COVID-19 vaccine, age 5y-11y (10mcg/0.3mL)(Comirnaty) 07/08/2024       Family History:  Family History   Problem Relation Name Age of Onset    No Known Problems Mother      No Known Problems Father         Social History:  Social History     Socioeconomic History    Marital status: Single   Tobacco Use    Smoking status: Never    Smokeless tobacco: Never   Substance and Sexual Activity    Alcohol use: Yes     Comment: Occasional / Every other Month.    Drug use: Never    Sexual activity: Never     Social Drivers of Health     Financial Resource Strain: Low Risk  (12/19/2024)    Overall Financial Resource Strain (CARDIA)     Difficulty of Paying Living Expenses: Not hard at all   Recent Concern: Financial Resource Strain - Medium Risk (12/18/2024)    Overall Financial Resource Strain (CARDIA)     Difficulty of Paying Living Expenses: Somewhat hard   Food Insecurity: Food Insecurity Present (12/18/2024)    Hunger Vital Sign     Worried About Running Out of Food in the Last Year: Often true     Ran Out of Food in the Last Year: Often true   Transportation Needs: No Transportation Needs (12/19/2024)    PRAPARE - Transportation     Lack of Transportation (Medical): No     Lack of Transportation (Non-Medical): No   Intimate Partner Violence: Not At Risk (12/18/2024)    Humiliation, Afraid, Rape, and Kick questionnaire     Fear of Current or Ex-Partner: No     Emotionally Abused: No     Physically Abused: No     Sexually Abused: No   Housing Stability: Low Risk  (12/19/2024)    Housing Stability Vital Sign     Unable to Pay for Housing in the Last Year: No     Number of Times Moved in the Last Year: 1     Homeless in the Last Year: No   Recent Concern: Housing Stability - High Risk (12/18/2024)    Housing Stability Vital Sign     Unable to Pay for Housing  in the Last Year: Yes     Number of Times Moved in the Last Year: 3     Homeless in the Last Year: No       Current Medications:  Current Outpatient Medications   Medication Instructions    albuterol (ProAir HFA) 90 mcg/actuation inhaler 2 puffs, inhalation, Every 4 hours PRN    cetirizine (ZYRTEC) 10 mg, oral, Daily    fluticasone (Flonase) 50 mcg/actuation nasal spray 1 spray, Each Nostril, 2 times daily, Shake gently. Before first use, prime pump. After use, clean tip and replace cap.    hydroxychloroquine (Plaquenil) 200 mg tablet 1 tablet, Daily    predniSONE (Deltasone) 10 mg tablet 3 tab (30mg) daily for 5 days, 2.5 tabs (25 mg) daily for 5 days,  2 tabs (20mg) daily for 5 days,  (15 mg) daily for 5 days,  1 tab (10 mg) daily for 5 days. Then 1/2 tab(5mg) daily for 15days,    tiotropium (Spiriva Respimat) 2.5 mcg/actuation inhaler 2 puffs, inhalation, Daily    triamcinolone (Kenalog) 0.1 % cream Topical, 2 times daily    Zafemy 150-35 mcg/24 hr 1 patch        Drug Allergies/Intolerances:  Allergies   Allergen Reactions    Pineapple Anaphylaxis, Unknown and Swelling    Amoxicillin Hives     Patient does not want testing    Azithromycin Diarrhea     Pt reports severe diarrhea and yellowing of her skin with the last course of azithromycin that she took 1/10/2025        Review of Systems:  Review of Systems     All other review of systems are negative and/or non-contributory.    Physical Examination:  /69   Pulse 79   Resp 18   Ht 1.524 m (5')   Wt 49.8 kg (109 lb 12.8 oz)   SpO2 98%   BMI 21.44 kg/m²      General: ambulated independently; no acute distress; well-nourished; work of breathing was not increased; normal vocal character  HEENT: normocephalic; anicteric sclerae; conjunctivae not injected; nasal mucosa was unremarkable; oropharynx was clear without evidence of thrush; dentition was good.  Neck: supple; no lymphadenopathy or thyromegaly.  Chest: clear to auscultation bilaterally; no chest  wall deformity.  Cardiac: regular rhythm; no gallop or murmur.  Abdomen: soft; non-tender; non-distended; no hepatosplenomegaly.  Extremities: no leg edema; no digital clubbing; 2+ pulses  Psychiatric: did not appear depressed or anxious.    Pulmonary Function Test Results     No bronchodilator response   no obstruction    Chest Radiograph     XR chest 2 views 02/28/2025    Narrative  Interpreted By:  Krzysztof Ivey,  STUDY:  XR CHEST 2 VIEWS;  2/28/2025 2:01 pm    INDICATION:  Signs/Symptoms:persistent cough x 6 weeks.    ,R05.3 Chronic cough    COMPARISON:  12/16/2020    ACCESSION NUMBER(S):  [id number]    ORDERING CLINICIAN:  JOYCE HERNANDEZ    FINDINGS:          CARDIOMEDIASTINAL SILHOUETTE:  Cardiomediastinal silhouette is normal in size and configuration.    LUNGS:  Lungs are clear.    ABDOMEN:  No remarkable upper abdominal findings.    BONES:  There is mild scoliosis. No acute osseous changes.    Impression  1.  No evidence of acute cardiopulmonary process.        MACRO:  None    Signed by: Krzysztof Ivey 3/1/2025 9:00 AM  Dictation workstation:   [id number]      Echocardiogram     Transthoracic Echo (TTE) Complete   11/27/2024    Height: 1.524 m (5')   Weight: 49 kg (108 lb)   Blood Pressure: Not recorded    Date of Study: 11/27/24   Ordering Provider: Israel Silva MD   Clinical Indications: Atypical chest pain [R07.89 (ICD-10-CM)], Other Systemic Lupus Erythematosus With Other Organ Involvement [M32.19 (ICD-10-CM)], Chest pain, unspecified [R07.9 (ICD-10-CM)]       Reading Physicians  Performing Staff   Cardiology: Francisco Mueller MD    Tech: Calista Padilla        Indications  Priority: Routine  Dx: Atypical chest pain [R07.89 (ICD-10-CM)]; Other systemic lupus erythematosus with other organ involvement [M32.19 (ICD-10-CM)]; Chest pain, unspecified [R07.9 (ICD-10-CM)]     PACS Images     Show images for Transthoracic Echo Complete  Interpretation Summary       [facility] [address] [place] [address]  [place] [address]               Tel [phone] and Fax [phone]     TRANSTHORACIC ECHOCARDIOGRAM REPORT        Patient Name:       JAMEE ZUÑIGA      Reading Physician:    [id number] Francisco Mueller MD  Study Date:         11/27/2024          Ordering Provider:    [id number] STACEY KUHN  MRN/PID:            [id number]            Fellow:  Accession#:         [id number]        Nurse:  Date of Birth/Age:  2000 / 24      Sonographer:          Calista Pimentel RDCS                      years  Gender assigned at  F                   Additional Staff:  Birth:  Height:             152.00 cm           Admit Date:           11/27/2024  Weight:             49.00 kg            Admission Status:     Outpatient  BSA / BMI:          1.43 m2 / 21.21     Encounter#:           [id number]                      kg/m2  Blood Pressure:     110/68 mmHg         Department Location:  [facility] Non                                                                Invasive     Study Type:    TRANSTHORACIC ECHO (TTE) COMPLETE  Diagnosis/ICD: Chest pain, unspecified-R07.9  Indication:    CP  CPT Code:      Echo Complete w Full Doppler-[id number]     Patient History:  Pertinent History: Chest Pain.     Study Detail: The following Echo studies were performed: 2D, M-Mode, Doppler and                color flow.        PHYSICIAN INTERPRETATION:  Left Ventricle: Left ventricular ejection fraction is normal, calculated by Salazar's biplane at 67%. There are no regional left ventricular wall motion abnormalities. The left ventricular cavity size is normal. Spectral Doppler shows a normal pattern of left ventricular diastolic filling.  Left Atrium: The left atrium is mildly dilated.  Right Ventricle: The right ventricle is normal in size. There is normal right ventricular global systolic function.  Right Atrium: The right  atrium is normal in size.  Aortic Valve: The aortic valve is trileaflet. The aortic valve dimensionless index is 0.88. There is no evidence of aortic valve regurgitation. The peak instantaneous gradient of the aortic valve is 7 mmHg. The mean gradient of the aortic valve is 3 mmHg.  Mitral Valve: The mitral valve is normal in structure. There is trace mitral valve regurgitation.  Tricuspid Valve: The tricuspid valve is structurally normal. There is trace tricuspid regurgitation. The Doppler estimated RVSP is within normal limits at 18.6 mmHg.  Pulmonic Valve: The pulmonic valve is structurally normal. There is physiologic pulmonic valve regurgitation.  Pericardium: There is no pericardial effusion noted.  Aorta: The aortic root is normal.  Systemic Veins: The inferior vena cava appears normal in size, with IVC inspiratory collapse greater than 50%.  In comparison to the previous echocardiogram(s): There are no prior studies on this patient for comparison purposes.        CONCLUSIONS:   1. Left ventricular ejection fraction is normal, calculated by Salazar's biplane at 67%.   2. There is normal right ventricular global systolic function.   3. The left atrium is mildly dilated.   4. Right ventricular systolic pressure is within normal limits.     QUANTITATIVE DATA SUMMARY:     2D MEASUREMENTS:          Normal Ranges:  LAs:             2.59 cm  (2.7-4.0cm)  IVSd:            0.51 cm  (0.6-1.1cm)  LVPWd:           0.57 cm  (0.6-1.1cm)  LVIDd:           4.62 cm  (3.9-5.9cm)  LVIDs:           2.87 cm  LV Mass Index:   50 g/m2  LVEDV Index:     55 ml/m2  LV % FS          37.9 %        LA VOLUME:                    Normal Ranges:  LA Vol A4C:        46.9 ml    (22+/-6mL/m2)  LA Vol A2C:        39.0 ml  LA Vol BP:         44.2 ml  LA Vol Index A4C:  32.7ml/m2  LA Vol Index A2C:  27.2 ml/m2  LA Vol Index BP:   30.8 ml/m2  LA Area A4C:       16.1 cm2  LA Area A2C:       14.2 cm2  LA Major Axis A4C: 4.7 cm  LA Major Axis A2C:  4.4 cm  LA Volume Index:   30.8 ml/m2  LA Vol A4C:        42.1 ml  LA Vol A2C:        37.6 ml  LA Vol Index BSA:  27.8 ml/m2        RA VOLUME BY A/L METHOD:            Normal Ranges:  RA Vol A4C:              27.1 ml    (8.3-19.5ml)  RA Vol Index A4C:        18.9 ml/m2  RA Area A4C:             12.5 cm2  RA Major Axis A4C:       4.9 cm        M-MODE MEASUREMENTS:         Normal Ranges:  Ao Root:             2.50 cm (2.0-3.7cm)  LAs:                 2.94 cm (2.7-4.0cm)        AORTA MEASUREMENTS:         Normal Ranges:  Ao Sinus, d:        2.80 cm (2.1-3.5cm)  Ao STJ, d:          2.30 cm (1.7-3.4cm)  Asc Ao, d:          2.40 cm (2.1-3.4cm)        LV SYSTOLIC FUNCTION BY 2D PLANIMETRY (MOD):                       Normal Ranges:  EF-A4C View:    65 % (>=55%)  EF-A2C View:    70 %  EF-Biplane:     67 %  LV EF Reported: 67 %        LV DIASTOLIC FUNCTION:             Normal Ranges:  MV Peak E:             0.80 m/s    (0.7-1.2 m/s)  MV Peak A:             0.51 m/s    (0.42-0.7 m/s)  E/A Ratio:             1.57        (1.0-2.2)  MV e'                  0.210 m/s   (>8.0)  MV lateral e'          0.24 m/s  MV medial e'           0.18 m/s  MV A Dur:              85.35 msec  E/e' Ratio:            3.83        (<8.0)  a'                     0.07 m/s  PulmV Sys Servando:         48.43 cm/s  PulmV Ng Servando:        74.10 cm/s  PulmV S/D Servando:         0.65  PulmV A Revs Servando:      29.44 cm/s  PulmV A Revs Dur:      124.57 msec        MITRAL VALVE:          Normal Ranges:  MV DT:        240 msec (150-240msec)        AORTIC VALVE:                     Normal Ranges:  AoV Vmax:                1.33 m/s (<=1.7m/s)  AoV Peak P.0 mmHg (<20mmHg)  AoV Mean PG:             3.5 mmHg (1.7-11.5mmHg)  LVOT Max Servando:            1.14 m/s (<=1.1m/s)  AoV VTI:                 24.27 cm (18-25cm)  LVOT VTI:                21.34 cm  LVOT Diameter:           1.81 cm  (1.8-2.4cm)  AoV Area, VTI:           2.26 cm2 (2.5-5.5cm2)  AoV Area,Vmax:            2.20 cm2 (2.5-4.5cm2)  AoV Dimensionless Index: 0.88        RIGHT VENTRICLE:  RV Basal 2.90 cm  RV Mid   2.50 cm  RV Major 6.0 cm  TAPSE:   23.0 mm  RV s'    0.17 m/s        TRICUSPID VALVE/RVSP:          Normal Ranges:  Peak TR Velocity:     1.97 m/s  Est. RA Pressure:     3 mmHg  RV Syst Pressure:     19 mmHg  (< 30mmHg)  IVC Diam:             1.90 cm        PULMONIC VALVE:          Normal Ranges:  PV Accel Time:  97 msec  (>120ms)  PV Max Servando:     1.1 m/s  (0.6-0.9m/s)  PV Max P.5 mmHg        Pulmonary Veins:  PulmV A Revs Dur: 124.57 msec  PulmV A Revs Servando: 29.44 cm/s  PulmV Ng Servando:   74.10 cm/s  PulmV S/D Servando:    0.65  PulmV Sys Servando:    48.43 cm/s        AORTA:  Asc Ao Diam 2.39 cm        [id number] Francisco Mueller MD  Electronically signed on 2024 at 7:53:54 PM           ** Final **       Chest CT Scan     CT chest wo IV contrast   3/20/2025  Status: Final result     PACS Images     Show images for CT chest wo IV contrast  Signed by    Signed Time Phone Pager   Krzysztof Ivey MD 3/21/2025 17:35 [phone]     Exam Information    Status Exam Begun Exam Ended   Final 3/20/2025 14:40 3/20/2025 14:56     Study Result    Narrative & Impression   Interpreted By:  Krzysztof Ivey,   STUDY:  CT CHEST WO IV CONTRAST;  3/20/2025 2:56 pm      INDICATION:  Signs/Symptoms:Persistent dry cough x 2 months, negative for COVID  and flu, not responsive to antibiotics x2 or steroid. Has SLE.Never  smoker.      ,R05.3 Chronic cough      COMPARISON:  None.      ACCESSION NUMBER(S):  [id number]      ORDERING CLINICIAN:  JOYCE HERNANDEZ      TECHNIQUE:  Helical data acquisition of the chest was obtained  without IV  contrast material.  Images were reformatted in axial, coronal, and  sagittal planes.      FINDINGS:  LUNGS AND AIRWAYS:  The trachea and central airways are patent. No endobronchial lesion.      Lungs are clear. There is no consolidation. There is no effusion. No  suspicious no nodules seen.       Note is made of a calcified granuloma in the right upper lobe.      MEDIASTINUM AND MAXIMILIANO, LOWER NECK AND AXILLA:  The visualized thyroid gland is within normal limits.      There are calcified right hilar and mediastinal lymph nodes. No  abnormal lymphadenopathy by size criteria      Esophagus appears within normal limits as seen.      HEART AND VESSELS:  The thoracic aorta is of normal course and caliber without vascular  calcifications.      Main pulmonary artery and its branches are normal in caliber.      No coronary artery calcifications are seen. The study is not  optimized for evaluation of coronary arteries.      The cardiac chambers are not enlarged.      No evidence of pericardial effusion.      UPPER ABDOMEN:  The visualized subdiaphragmatic structures demonstrate no remarkable  findings. Note is made that evaluation is limited in the absence of  contrast      CHEST WALL AND OSSEOUS STRUCTURES:  There are no suspicious osseous lesions. Multilevel degenerative  changes are present      IMPRESSION:  1.  Evidence of granulomatous disease with calcified granuloma in the  right upper lobe and calcified right hilar and mediastinal lymph  nodes.  2. No other abnormality seen      MACRO:  None      Signed by: Krzysztof Ivey 3/21/2025 5:35 PM  Dictation workstation:   [id number]          Assessment and Plan / Recommendations:  Problem List Items Addressed This Visit       SLE (systemic lupus erythematosus) (Multi)    Overview     Managed w/ Plaquenil 200 mg daily since 2/2024.  Follows w/ Dr. Jyoti Hansen, Rheumatology [facility]         Relevant Orders    C3 complement    C4 complement    Chronic cough    Overview     Onset 1/7/2025. Initially thought to be infectious origin. Has failed treatment w/ doxycycline hyclate, moxifloxacin, and a medrol dose pack. Tested negative for COVID-19 and influenza A&B at symptom onset in 1/10/2025, again in 2/2025, and again 3/10/2025.  CBC w/ diff normal. Resp IgE  and Food IgE completed and does show some positive findings.          Relevant Medications    predniSONE (Deltasone) 10 mg tablet    Other Relevant Orders    Hypersensitivity Pneumonitis Panel    Angiotensin Converting Enzyme    Sedimentation rate, automated    C-reactive protein    Allergic rhinitis    Overview     2-28-25 - Resiratory allergy profile was positive to dust mite, mold, cat, mild to dog, cockroach, grasses, ragweed/weeds and trees.         Bronchitis - Primary             Assessment and plan :    chronic cough  Post bronchitic cough  Possibility of lupus flare  PFT no bronchodilator response nor obstruction  CT chest shows calcified nodule and calcified small lymph node  Connective tissue disease workup in March no evidence of disease activity  Prescribed prednisone 30 mg daily for 5 days and decrease by 5 mg every 5 days till reaching 5 mg daily for 15 days  Continue with bronchodilator albuterol  Check hypersensitivity panel    Allergic rhinitis continue with Flonase    SLE  On Plaquenil  Assess disease activity check ESR C-reactive protein and complement level  Consider adding sulfasalazine  Will be seen by rheumatology in April    Pulmonary nodule:  Calcified peripherally located smooth surface, can represent calcified granuloma      Ed Hein MD  03/31/2025

## 2025-03-31 ENCOUNTER — OFFICE VISIT (OUTPATIENT)
Facility: CLINIC | Age: 25
End: 2025-03-31
Payer: COMMERCIAL

## 2025-03-31 ENCOUNTER — APPOINTMENT (OUTPATIENT)
Facility: CLINIC | Age: 25
End: 2025-03-31
Payer: COMMERCIAL

## 2025-03-31 VITALS
WEIGHT: 109.8 LBS | DIASTOLIC BLOOD PRESSURE: 69 MMHG | SYSTOLIC BLOOD PRESSURE: 105 MMHG | RESPIRATION RATE: 18 BRPM | HEART RATE: 79 BPM | OXYGEN SATURATION: 98 % | BODY MASS INDEX: 21.55 KG/M2 | HEIGHT: 60 IN

## 2025-03-31 DIAGNOSIS — J40 BRONCHITIS: Primary | ICD-10-CM

## 2025-03-31 DIAGNOSIS — J30.9 ALLERGIC RHINITIS, UNSPECIFIED SEASONALITY, UNSPECIFIED TRIGGER: ICD-10-CM

## 2025-03-31 DIAGNOSIS — R05.3 CHRONIC COUGH: ICD-10-CM

## 2025-03-31 DIAGNOSIS — M32.9 SYSTEMIC LUPUS ERYTHEMATOSUS, UNSPECIFIED SLE TYPE, UNSPECIFIED ORGAN INVOLVEMENT STATUS (MULTI): ICD-10-CM

## 2025-03-31 PROCEDURE — 1036F TOBACCO NON-USER: CPT | Performed by: INTERNAL MEDICINE

## 2025-03-31 PROCEDURE — G8433 SCR FOR DEP NOT CPT DOC RSN: HCPCS | Performed by: INTERNAL MEDICINE

## 2025-03-31 PROCEDURE — 3008F BODY MASS INDEX DOCD: CPT | Performed by: INTERNAL MEDICINE

## 2025-03-31 PROCEDURE — 99204 OFFICE O/P NEW MOD 45 MIN: CPT | Performed by: INTERNAL MEDICINE

## 2025-03-31 RX ORDER — PREDNISONE 10 MG/1
TABLET ORAL
Qty: 30 TABLET | Refills: 1 | Status: SHIPPED | OUTPATIENT
Start: 2025-03-31 | End: 2025-04-30

## 2025-03-31 ASSESSMENT — ENCOUNTER SYMPTOMS
LOSS OF SENSATION IN FEET: 0
DEPRESSION: 0
OCCASIONAL FEELINGS OF UNSTEADINESS: 0

## 2025-03-31 ASSESSMENT — PATIENT HEALTH QUESTIONNAIRE - PHQ9
1. LITTLE INTEREST OR PLEASURE IN DOING THINGS: NOT AT ALL
SUM OF ALL RESPONSES TO PHQ9 QUESTIONS 1 AND 2: 0
2. FEELING DOWN, DEPRESSED OR HOPELESS: NOT AT ALL

## 2025-04-04 ENCOUNTER — HOSPITAL ENCOUNTER (EMERGENCY)
Facility: HOSPITAL | Age: 25
Discharge: HOME | End: 2025-04-04
Payer: COMMERCIAL

## 2025-04-04 ENCOUNTER — TELEPHONE (OUTPATIENT)
Dept: PRIMARY CARE | Facility: CLINIC | Age: 25
End: 2025-04-04
Payer: COMMERCIAL

## 2025-04-04 VITALS
RESPIRATION RATE: 18 BRPM | TEMPERATURE: 97.7 F | DIASTOLIC BLOOD PRESSURE: 98 MMHG | HEART RATE: 78 BPM | OXYGEN SATURATION: 98 % | SYSTOLIC BLOOD PRESSURE: 125 MMHG

## 2025-04-04 DIAGNOSIS — G90.A POTS (POSTURAL ORTHOSTATIC TACHYCARDIA SYNDROME): Primary | ICD-10-CM

## 2025-04-04 PROCEDURE — 99281 EMR DPT VST MAYX REQ PHY/QHP: CPT

## 2025-04-04 ASSESSMENT — COLUMBIA-SUICIDE SEVERITY RATING SCALE - C-SSRS
1. IN THE PAST MONTH, HAVE YOU WISHED YOU WERE DEAD OR WISHED YOU COULD GO TO SLEEP AND NOT WAKE UP?: NO
2. HAVE YOU ACTUALLY HAD ANY THOUGHTS OF KILLING YOURSELF?: NO
6. HAVE YOU EVER DONE ANYTHING, STARTED TO DO ANYTHING, OR PREPARED TO DO ANYTHING TO END YOUR LIFE?: NO

## 2025-04-04 ASSESSMENT — PAIN SCALES - GENERAL: PAINLEVEL_OUTOF10: 0 - NO PAIN

## 2025-04-04 ASSESSMENT — PAIN - FUNCTIONAL ASSESSMENT: PAIN_FUNCTIONAL_ASSESSMENT: 0-10

## 2025-04-04 NOTE — ED PROVIDER NOTES
HPI   Chief Complaint   Patient presents with    Syncope       This is a pleasant 24-year-old female who had an episode of syncope she states she has a history of POTS syndrome and this is likely the cause of her symptoms currently she feels fine I saw her initially in triage I offered labs and IV fluid she states she recently had labs and she does not feel that IV fluid is warranted she asks if she can actually leave at this point in time.  Her vital signs are stable considering she does not want intervention this appears reasonable.              Patient History   Past Medical History:   Diagnosis Date    ADHD 09/02/2020    ADHD (attention deficit hyperactivity disorder)     Allergic     Eating disorder     Eczema     Headache     Irregular heart beat 09/02/2020    Scoliosis     Visual impairment      Past Surgical History:   Procedure Laterality Date    WISDOM TOOTH EXTRACTION       Family History   Problem Relation Name Age of Onset    No Known Problems Mother      No Known Problems Father       Social History     Tobacco Use    Smoking status: Never    Smokeless tobacco: Never   Substance Use Topics    Alcohol use: Yes     Comment: Occasional / Every other Month.    Drug use: Never       Physical Exam   ED Triage Vitals [04/04/25 1337]   Temperature Heart Rate Respirations BP   36.5 °C (97.7 °F) 78 18 (!) 125/98      Pulse Ox Temp src Heart Rate Source Patient Position   98 % -- -- --      BP Location FiO2 (%)     -- --       Physical Exam  Vitals and nursing note reviewed.   Constitutional:       General: She is not in acute distress.     Appearance: Normal appearance. She is normal weight. She is not ill-appearing, toxic-appearing or diaphoretic.   HENT:      Head: Normocephalic and atraumatic.      Right Ear: External ear normal.      Left Ear: External ear normal.      Nose: Nose normal.      Mouth/Throat:      Mouth: Mucous membranes are moist.   Eyes:      Extraocular Movements: Extraocular movements  intact.      Conjunctiva/sclera: Conjunctivae normal.      Pupils: Pupils are equal, round, and reactive to light.   Cardiovascular:      Rate and Rhythm: Normal rate and regular rhythm.   Pulmonary:      Effort: Pulmonary effort is normal. No respiratory distress.      Breath sounds: No stridor.   Abdominal:      General: There is no distension.      Tenderness: There is no guarding or rebound.   Musculoskeletal:         General: No swelling, tenderness or deformity.      Cervical back: Normal range of motion.   Skin:     General: Skin is warm.      Capillary Refill: Capillary refill takes less than 2 seconds.      Coloration: Skin is not jaundiced or pale.      Findings: No bruising or rash.   Neurological:      General: No focal deficit present.      Mental Status: She is alert and oriented to person, place, and time. Mental status is at baseline.      Cranial Nerves: No cranial nerve deficit.      Sensory: No sensory deficit.      Motor: No weakness.      Coordination: Coordination normal.   Psychiatric:         Mood and Affect: Mood normal.         Behavior: Behavior normal.         Thought Content: Thought content normal.         Judgment: Judgment normal.           ED Course & MDM   Diagnoses as of 04/04/25 1858   POTS (postural orthostatic tachycardia syndrome)                 No data recorded     [place] Coma Scale Score: 15 (04/04/25 1353 : Rudy Khalil, HENRY)                           Medical Decision Making  Differential diagnosis of POTS, vasovagal, cardiogenic, neurogenic syncope    Consider EKG cardiac workup IV fluid however patient declines ultimately will discharge at her request        Procedure  Procedures     Waldo Moreno PA-C  04/04/25 9153

## 2025-04-04 NOTE — TELEPHONE ENCOUNTER
Please contact patient to schedule an appointment with Janelle for an ER follow-up. Patient needs to be cleared before she is able to return to work.

## 2025-04-04 NOTE — ED TRIAGE NOTES
Pt to ED with complaint of having a syncopal episode. St was working upstairs when began to experience dizziness and tachycardia. Pt then had syncopal episode. A rapid response was called. Pt arrives a/o x4, st feels back at baseline. Denies CP/SOB.

## 2025-04-09 NOTE — PATIENT INSTRUCTIONS
When you feel the need for cough, tuck your chin down and back and try to swallow.    If cough does not improve, please follow up with the vocal cord specialist.    Follow up as needed.

## 2025-04-09 NOTE — PROGRESS NOTES
"  Subjective   Patient ID:   [id number]   Essence Almodovar is a 25 y.o. female who presents for Follow-up.    Chief Complaint   Patient presents with    Follow-up      Patient presents for one month neck rash F/U.    Since last visit, 3-18-25, patient reports her rash has improved and she continues Kenalog cream only as needed.  She saw her rheumatologist who thought the rash was likely eczema and not lupus-related.    Patient states her nighttime, mid chest cough has improved but will cough with activity/walking.  The cough arises from the base of her neck.  She does not think Spiriva was helpful.  Patient notes the order to see a vocal cord specialist given to her by us is still there but she has not made an appointment yet.      Patient states she woke up with right upper eyelid swelling and face and arm, itchy \"hives.\"  She went to the ED where they prescribed a Medrol Dosepak.  The hives did not migrate and it took a few days for the hives to resolve.  It  took about 3 days for her swelling to subside.  She denies any known triggers or changes to account for the reaction.    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      Patient had \"hives\" after having amoxicillin as a child but deferred testing.    Objective   /78   Ht 1.524 m (5')   Wt 47.6 kg (105 lb)   BMI 20.51 kg/m²      Physical Exam  Constitutional:       Appearance: Normal appearance.   HENT:      Head: Normocephalic and atraumatic.      Right Ear: External ear normal. There is no impacted cerumen.      Left Ear: External ear normal. There is no impacted cerumen.      Nose: No congestion or rhinorrhea.   Eyes:      Extraocular Movements: Extraocular movements intact.      Conjunctiva/sclera: Conjunctivae normal.      Pupils: Pupils are equal, round, and reactive to light.   Cardiovascular:      Rate and Rhythm: Normal rate and regular rhythm.      Heart sounds: No murmur heard.     No friction rub. No gallop.   Pulmonary:      " Effort: No respiratory distress.      Breath sounds: No wheezing, rhonchi or rales.   Skin:     General: Skin is warm and dry.      Comments: Mildly xerotic under chin on neck.    Neurological:      Mental Status: She is alert.   Psychiatric:         Mood and Affect: Mood normal.         Behavior: Behavior normal.     Assessment/Plan     Chronic cough  The nighttime, mid chest cough improved but still coughs with activity/walking.  It now arises from the base of her neck.  She thinks Spiriva was not helpful but has not seen the vocal cord specialist I referred her to last visit.    She is clear on exam today.  I discussed trying a forceful swallow if she feels the need to cough.  If her cough persists, she should see the vocal cord specialist.    She can return as needed.    Skin infection  She reports the neck rash has improved.  She uses Kenalog cream only as needed.  Her rheumatologist believes the rash was likely eczema-related and not secondary to lupus.    She can continue her Kenalog as needed.    By signing my name below, I, Yen Gregoryibmarybel, attest that this documentation has been prepared under the direction and in the presence of Jennifer Carpenter MD.  All medical record entries made by the Scribe were at my direction and personally dictated by me. I have reviewed the chart and agree that the record accurately reflects my personal performance of the history, physical exam, discussion and plan.

## 2025-04-14 ENCOUNTER — APPOINTMENT (OUTPATIENT)
Dept: PRIMARY CARE | Facility: CLINIC | Age: 25
End: 2025-04-14
Payer: COMMERCIAL

## 2025-04-14 VITALS
HEIGHT: 60 IN | TEMPERATURE: 99.7 F | RESPIRATION RATE: 16 BRPM | OXYGEN SATURATION: 98 % | HEART RATE: 92 BPM | SYSTOLIC BLOOD PRESSURE: 109 MMHG | WEIGHT: 105 LBS | DIASTOLIC BLOOD PRESSURE: 74 MMHG | BODY MASS INDEX: 20.62 KG/M2

## 2025-04-14 DIAGNOSIS — J84.10 GRANULOMATOUS LUNG DISEASE (MULTI): ICD-10-CM

## 2025-04-14 DIAGNOSIS — G90.A POTS (POSTURAL ORTHOSTATIC TACHYCARDIA SYNDROME): Primary | ICD-10-CM

## 2025-04-14 DIAGNOSIS — R55 SYNCOPE AND COLLAPSE: ICD-10-CM

## 2025-04-14 DIAGNOSIS — M32.9 SYSTEMIC LUPUS ERYTHEMATOSUS, UNSPECIFIED SLE TYPE, UNSPECIFIED ORGAN INVOLVEMENT STATUS (MULTI): ICD-10-CM

## 2025-04-14 DIAGNOSIS — R11.0 NAUSEA: ICD-10-CM

## 2025-04-14 PROCEDURE — 99214 OFFICE O/P EST MOD 30 MIN: CPT | Performed by: NURSE PRACTITIONER

## 2025-04-14 RX ORDER — ONDANSETRON 4 MG/1
4 TABLET, ORALLY DISINTEGRATING ORAL EVERY 8 HOURS PRN
Qty: 30 TABLET | Refills: 0 | Status: SHIPPED | OUTPATIENT
Start: 2025-04-14 | End: 2025-04-24

## 2025-04-14 ASSESSMENT — ENCOUNTER SYMPTOMS
COUGH: 1
FATIGUE: 0

## 2025-04-14 NOTE — PROGRESS NOTES
Subjective   Essence Almodovar is a 24 y.o. female who presents for Return to Work.    HPI  Review of Systems   Constitutional:  Negative for fatigue.   Respiratory:  Positive for cough.    Objective     Physical Exam  Vitals reviewed.   Constitutional:       General: She is not in acute distress.     Appearance: Normal appearance. She is not ill-appearing.   HENT:      Head: Normocephalic.   Eyes:      Conjunctiva/sclera: Conjunctivae normal.   Neck:      Thyroid: No thyroid mass, thyromegaly or thyroid tenderness.   Cardiovascular:      Rate and Rhythm: Normal rate and regular rhythm.      Pulses: Normal pulses.      Heart sounds: No murmur heard.  Pulmonary:      Effort: Pulmonary effort is normal.      Breath sounds: Normal breath sounds.   Abdominal:      General: Bowel sounds are normal.      Palpations: Abdomen is soft.   Musculoskeletal:      Cervical back: Neck supple.      Right lower leg: No edema.      Left lower leg: No edema.   Lymphadenopathy:      Cervical: No cervical adenopathy.   Skin:     General: Skin is warm and dry.   Neurological:      General: No focal deficit present.      Mental Status: She is alert and oriented to person, place, and time.   Psychiatric:         Mood and Affect: Mood normal.         Thought Content: Thought content normal.         /74   Pulse 92   Temp 37.6 °C (99.7 °F)   Resp 16   Ht 1.524 m (5')   Wt 47.6 kg (105 lb)   SpO2 98%   BMI 20.51 kg/m²     Assessment/Plan     Problem List Items Addressed This Visit       SLE (systemic lupus erythematosus) (Multi)    Overview     Managed w/ Plaquenil 200 mg daily since 2/2024.  Follows w/ Dr. Jyoti Hansen, Rheumatology [facility]         Syncope and collapse    Overview     Multiple ER visits starting mid-October, 2024 for c/o dizziness, near-syncope, and syncope with collapse. Has seen Dr. Silva for cardiology. Echo completed 11/27/2024 showed EF 67% and no valvular abnormalities or pericardial effusion.  48 hour monitor study showed rare PACs and PVCs but no sustained pathologic arrhythmias.  Dr. Silva suspects POTS and referred to EP. Tilt table test on 2/10/2025 w/ Dr. Ott confirmed POTS diagnosis, and pt was referred to the neurology autonomic clinic and has an appointment scheduled 6/2025.         POTS (postural orthostatic tachycardia syndrome) - Primary    Overview     POTS confirmed by tilt table test 2/10/2025. Dr. Ott  Scheduled with Neurology Autonomic Clinic 6/17/2025.          Current Assessment & Plan     Unfortunately, she had another episode of syncope at work on 4/4/2025. She was sent to ER at [facility] [employer], where she works, but she declined IV fluids, labs, or any work-up because she didn't want to spend the money on an ER visit. States she is wearing compression stockings intermittently, has increased her water intake and has been adding salt packets to her meals. BP and HR stable during today's exam. She already has an appointment with Dr. Ott scheduled for 4/28/2025. Unfortunately, her neuro-autonomic clinic appointment that was scheduled for 6/17/25 was cancelled by the provider. She was advised to reschedule.          Granulomatous lung disease (Multi)    Overview     Calcified granuloma noted in right upper lobe along with calcified lymph nodes in hilar and mediastinal region.  Noted on CT chest on 3/20/2025. Has pulmonology appt scheduled for 3/31/2025 w/ Dr. Ed Hein.          Other Visit Diagnoses       Nausea        Relevant Medications    ondansetron ODT (Zofran-ODT) 4 mg disintegrating tablet

## 2025-04-14 NOTE — ASSESSMENT & PLAN NOTE
Unfortunately, she had another episode of syncope at work on 4/4/2025. She was sent to ER at [facility], where she works, but she declined IV fluids, labs, or any work-up because she didn't want to spend the money on an ER visit. States she is wearing compression stockings intermittently, has increased her water intake and has been adding salt packets to her meals. BP and HR stable during today's exam. She already has an appointment with Dr. Ott scheduled for 4/28/2025. Unfortunately, her neuro-autonomic clinic appointment that was scheduled for 6/17/25 was cancelled by the provider. She was advised to reschedule.

## 2025-04-17 ENCOUNTER — APPOINTMENT (OUTPATIENT)
Dept: PRIMARY CARE | Facility: CLINIC | Age: 25
End: 2025-04-17
Payer: COMMERCIAL

## 2025-04-22 ENCOUNTER — APPOINTMENT (OUTPATIENT)
Dept: ALLERGY | Facility: CLINIC | Age: 25
End: 2025-04-22
Payer: COMMERCIAL

## 2025-04-22 ENCOUNTER — OFFICE VISIT (OUTPATIENT)
Dept: PRIMARY CARE | Facility: CLINIC | Age: 25
End: 2025-04-22
Payer: COMMERCIAL

## 2025-04-22 VITALS
WEIGHT: 108 LBS | HEART RATE: 84 BPM | SYSTOLIC BLOOD PRESSURE: 113 MMHG | RESPIRATION RATE: 17 BRPM | TEMPERATURE: 98.2 F | BODY MASS INDEX: 21.2 KG/M2 | DIASTOLIC BLOOD PRESSURE: 79 MMHG | HEIGHT: 60 IN | OXYGEN SATURATION: 97 %

## 2025-04-22 VITALS
DIASTOLIC BLOOD PRESSURE: 78 MMHG | BODY MASS INDEX: 20.62 KG/M2 | SYSTOLIC BLOOD PRESSURE: 127 MMHG | WEIGHT: 105 LBS | HEIGHT: 60 IN

## 2025-04-22 DIAGNOSIS — R44.1 HALLUCINATIONS, VISUAL: ICD-10-CM

## 2025-04-22 DIAGNOSIS — M32.9 SYSTEMIC LUPUS ERYTHEMATOSUS, UNSPECIFIED SLE TYPE, UNSPECIFIED ORGAN INVOLVEMENT STATUS (MULTI): ICD-10-CM

## 2025-04-22 DIAGNOSIS — R00.2 PALPITATIONS: ICD-10-CM

## 2025-04-22 DIAGNOSIS — R05.3 CHRONIC COUGH: ICD-10-CM

## 2025-04-22 DIAGNOSIS — R44.0 AUDITORY HALLUCINATIONS: ICD-10-CM

## 2025-04-22 DIAGNOSIS — G90.A POTS (POSTURAL ORTHOSTATIC TACHYCARDIA SYNDROME): Primary | ICD-10-CM

## 2025-04-22 DIAGNOSIS — L08.9 SKIN INFECTION: Primary | ICD-10-CM

## 2025-04-22 DIAGNOSIS — R55 SYNCOPE AND COLLAPSE: ICD-10-CM

## 2025-04-22 PROCEDURE — 3008F BODY MASS INDEX DOCD: CPT | Performed by: ALLERGY & IMMUNOLOGY

## 2025-04-22 PROCEDURE — 99214 OFFICE O/P EST MOD 30 MIN: CPT | Performed by: NURSE PRACTITIONER

## 2025-04-22 PROCEDURE — 99213 OFFICE O/P EST LOW 20 MIN: CPT | Performed by: ALLERGY & IMMUNOLOGY

## 2025-04-22 PROCEDURE — 1036F TOBACCO NON-USER: CPT | Performed by: ALLERGY & IMMUNOLOGY

## 2025-04-22 PROCEDURE — 3008F BODY MASS INDEX DOCD: CPT | Performed by: NURSE PRACTITIONER

## 2025-04-22 ASSESSMENT — ENCOUNTER SYMPTOMS
LIGHT-HEADEDNESS: 1
PALPITATIONS: 1
HEADACHES: 0
COUGH: 1
FEVER: 0
DIARRHEA: 0
VOMITING: 0
NAUSEA: 1
SHORTNESS OF BREATH: 0
HALLUCINATIONS: 1
DIZZINESS: 1
FATIGUE: 0
CHILLS: 0

## 2025-04-22 NOTE — ASSESSMENT & PLAN NOTE
She reports the neck rash has improved.  She uses Kenalog cream only as needed.  Her rheumatologist believes the rash was likely eczema-related and not secondary to lupus.    She can continue her Kenalog as needed.

## 2025-04-22 NOTE — ASSESSMENT & PLAN NOTE
Unfortunately, her neurology autonomic clinic appointment for 6/17/2025 has been cancelled. She is scheduled w/ [place]amm[place]13[place]2025. Discussed pt's request to return to work w/ Dr. Ott (EP). He states she may return to work but should be sure she is drinking a lot of water, where bilateral lower extremity compression stockings while at work, and avoid standing up abruptly.  I agree with those recommendations.  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  I will complete her disability and return to work paperwork now. Pt admits she does not like wearing compression stockings and thinks she feels worse with them sometimes. Discussed the rationale for compression stockings with patient and advised her to wear them.

## 2025-04-22 NOTE — PROGRESS NOTES
Will Subjective   Essence Almodovar is a 25 y.o. female who presents for Follow-up (Follow-up of POTS, chronic cough, and hallucinations. Pt. Requesting completion of disability paperwork and medical release to return to work. ).    HPI States she had a bad week last week, woke up last Wednesday with an allergic reaction but unknown trigger. She had an itchy, red macular rash on her face (mildly raised) and a few hives on left forearm. Showed me pictures of rash on her phone. She took Claritin and then went to Urgent Care, where she was given a medrol dose-wero.  She also reports she had episodes of POTS about once per day last week. She reports 2 POTS episodes today, one on Sunday, and doesn't remember if she had one yesterday. She did not lose consciousness for any of these, but did feel very lightheaded. She states that at its worst, she also gets a spinning sensation. When this happens at home, she lays down, which resolves her symptoms. She presents today requesting return to work paperwork.  She does have a F/U w/ EP Dr Ott on 4/28/2025.    Review of Systems   Constitutional:  Negative for chills, fatigue and fever.   Respiratory:  Positive for cough (dry, improving per pt.). Negative for shortness of breath.    Cardiovascular:  Positive for palpitations. Negative for chest pain.   Gastrointestinal:  Positive for nausea (during episodes of POTS). Negative for diarrhea and vomiting.   Neurological:  Positive for dizziness and light-headedness. Negative for syncope (None since last visit.) and headaches.   Psychiatric/Behavioral:  Positive for hallucinations (auditory and visual).    Objective     Physical Exam  Vitals reviewed.   Constitutional:       General: She is not in acute distress.     Appearance: Normal appearance. She is not ill-appearing.   HENT:      Head: Normocephalic.   Eyes:      Conjunctiva/sclera: Conjunctivae normal.   Neck:      Thyroid: No thyroid mass, thyromegaly or thyroid tenderness.    Cardiovascular:      Rate and Rhythm: Normal rate and regular rhythm.      Pulses: Normal pulses.      Heart sounds: No murmur heard.  Pulmonary:      Effort: Pulmonary effort is normal. No tachypnea, accessory muscle usage or respiratory distress.      Breath sounds: Normal breath sounds and air entry.      Comments: Has shallow, dry, isolated cough, occurring one at a time. This is very mild with no depth to the cough, no upper airway wheezing or production of phlegm. The cough does not cause dyspnea  Abdominal:      General: Bowel sounds are normal.      Palpations: Abdomen is soft.   Musculoskeletal:      Cervical back: Neck supple.      Right lower leg: No edema.      Left lower leg: No edema.   Lymphadenopathy:      Cervical: No cervical adenopathy.   Skin:     General: Skin is warm and dry.   Neurological:      General: No focal deficit present.      Mental Status: She is alert and oriented to person, place, and time.   Psychiatric:         Mood and Affect: Mood normal.         Thought Content: Thought content normal.     /79   Pulse 84   Temp 36.8 °C (98.2 °F)   Resp 17   Ht 1.524 m (5')   Wt 49 kg (108 lb)   SpO2 97%   BMI 21.09 kg/m²     Assessment/Plan     Problem List Items Addressed This Visit       SLE (systemic lupus erythematosus) (Multi)    Overview   Managed w/ Plaquenil 200 mg daily since 2/2024.  Follows w/ Dr. Jyoti Hansen, Rheumatology [facility]         Syncope and collapse    Overview   Multiple ER visits starting mid-October, 2024 for c/o dizziness, near-syncope, and syncope with collapse. Has seen Dr. Silva for cardiology. Echo completed 11/27/2024 showed EF 67% and no valvular abnormalities or pericardial effusion. 48 hour monitor study showed rare PACs and PVCs but no sustained pathologic arrhythmias.  Dr. Silva suspects POTS and referred to EP. Tilt table test on 2/10/2025 w/ Dr. Ott confirmed POTS diagnosis, and pt was referred to the neurology autonomic  clinic and had an appointment scheduled 6/2025 that unfortunately was cancelled.          Current Assessment & Plan   She is scheduled w/ Dr. Trammell (neurology) 8/13/2025 for POTS, but she believes she still needs another referral to the Neurology Autonomic Clinic. A referral has been placed, but will also reach out to Dr. Trammell for clarification.          Palpitations    Current Assessment & Plan   Occurs with each episode of POTS, along w/ nausea.          POTS (postural orthostatic tachycardia syndrome) - Primary    Overview   POTS confirmed by tilt table test 2/10/2025. Dr. Ott  Scheduled with Neurology [facility]onomic Clinic 6/17/2025.          Current Assessment & Plan   Unfortunately, her neurology autonomic clinic appointment for 6/17/2025 has been cancelled. Entered new referral per pt request. She is scheduled w/ Dr. Trammell 8/2025, but pt does not believe this is for the autonomic clinic. Discussed pt's request to return to work w/ Dr. Ott (EP). He states she may return to work but should be sure she is drinking a lot of water, where bilateral lower extremity compression stockings while at work, and avoid standing up abruptly.  I agree with those recommendations.  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  I will complete her disability and return to work paperwork now. Pt admits she does not like wearing compression stockings and thinks she feels worse with them sometimes. Discussed the rationale for compression stockings with patient and advised her to wear them.          Hallucinations, visual    Overview   Pt. Admits to visual and auditory hallucinations for several months (onset approx. 6/2024).         Current Assessment & Plan   Admits to continued visual hallucinations, which present as seeing bugs in places where other do not. She states she will try to smash them with her hand, but then  sees no blood on her hand afterwards. Pt. Refusing to see psychiatry. However, when pressed on the importance of having this evaluated and treated so that symptoms do not worsen, she states she has an active referral through [facility] and will call to schedule with them.         Auditory hallucinations    Overview   Pt. Admits to visual and auditory hallucinations for several months (onset approx. 6/2024).         Current Assessment & Plan   Admits she is still having both auditory and visual hallucinations, but states they are less frequent. She does not know what brings them on. She states it sounds like people whispering behind her, but she can't tell what they are saying. She refuses to see psychiatry.

## 2025-04-22 NOTE — ASSESSMENT & PLAN NOTE
Admits to continued visual hallucinations, which present as seeing bugs in places where other do not. She states she will try to smash them with her hand, but then sees no blood on her hand afterwards. Pt. Refusing to see psychiatry. However, when pressed on the importance of having this evaluated and treated so that symptoms do not worsen, she states she has an active referral through [facility] and will call to schedule with them.

## 2025-04-22 NOTE — ASSESSMENT & PLAN NOTE
Admits she is still having both auditory and visual hallucinations, but states they are less frequent. She does not know what brings them on. She states it sounds like people whispering behind her, but she can't tell what they are saying. She refuses to see psychiatry and has canceled appointments

## 2025-04-23 NOTE — ASSESSMENT & PLAN NOTE
She is scheduled w/ Dr. Trammell (neurology) 8/13/2025 for POTS, but she believes she still needs another referral to the Neurology Autonomic Clinic. A referral has been placed, but will also reach out to Dr. Trammell for clarification.

## 2025-04-28 ENCOUNTER — OFFICE VISIT (OUTPATIENT)
Dept: CARDIOLOGY | Facility: HOSPITAL | Age: 25
End: 2025-04-28
Payer: COMMERCIAL

## 2025-04-28 VITALS
BODY MASS INDEX: 17.49 KG/M2 | WEIGHT: 105 LBS | DIASTOLIC BLOOD PRESSURE: 53 MMHG | OXYGEN SATURATION: 100 % | HEART RATE: 75 BPM | HEIGHT: 65 IN | SYSTOLIC BLOOD PRESSURE: 98 MMHG

## 2025-04-28 DIAGNOSIS — R55 SYNCOPE AND COLLAPSE: Primary | ICD-10-CM

## 2025-04-28 LAB
ATRIAL RATE: 75 BPM
P AXIS: 62 DEGREES
P OFFSET: 205 MS
P ONSET: 154 MS
PR INTERVAL: 134 MS
Q ONSET: 221 MS
QRS COUNT: 12 BEATS
QRS DURATION: 78 MS
QT INTERVAL: 374 MS
QTC CALCULATION(BAZETT): 417 MS
QTC FREDERICIA: 402 MS
R AXIS: 84 DEGREES
T AXIS: 62 DEGREES
T OFFSET: 408 MS
VENTRICULAR RATE: 75 BPM

## 2025-04-28 PROCEDURE — 93010 ELECTROCARDIOGRAM REPORT: CPT | Performed by: INTERNAL MEDICINE

## 2025-04-28 PROCEDURE — 99214 OFFICE O/P EST MOD 30 MIN: CPT | Mod: 25 | Performed by: INTERNAL MEDICINE

## 2025-04-28 PROCEDURE — 93005 ELECTROCARDIOGRAM TRACING: CPT | Performed by: INTERNAL MEDICINE

## 2025-04-28 PROCEDURE — 99212 OFFICE O/P EST SF 10 MIN: CPT | Mod: 25 | Performed by: INTERNAL MEDICINE

## 2025-04-28 PROCEDURE — 99214 OFFICE O/P EST MOD 30 MIN: CPT | Performed by: INTERNAL MEDICINE

## 2025-04-28 PROCEDURE — 1036F TOBACCO NON-USER: CPT | Performed by: INTERNAL MEDICINE

## 2025-04-28 PROCEDURE — 3008F BODY MASS INDEX DOCD: CPT | Performed by: INTERNAL MEDICINE

## 2025-04-28 RX ORDER — METOPROLOL SUCCINATE 25 MG/1
25 TABLET, EXTENDED RELEASE ORAL DAILY
Qty: 30 TABLET | Refills: 11 | Status: SHIPPED | OUTPATIENT
Start: 2025-04-28 | End: 2026-04-28

## 2025-04-28 ASSESSMENT — ENCOUNTER SYMPTOMS
LOSS OF SENSATION IN FEET: 0
OCCASIONAL FEELINGS OF UNSTEADINESS: 0

## 2025-04-28 NOTE — PROGRESS NOTES
Referred by Dl Cao MD provider found for   Chief Complaint   Patient presents with    Syncope        Essence Almodovar is a 25 y.o. year old female patient with h/o syncope. Presents for follow up with study results.     PMHx/PSHx: As above    FamHx: unremarkable     Allergies:  RX Allergies[1]     Review of Systems    Constitutional: not feeling tired.   Eyes: no eyesight problems.   ENT: no hearing loss and no nosebleeds.   Cardiovascular: no intermittent leg claudication and as noted in HPI.   Respiratory: no chronic cough and no shortness of breath.   Gastrointestinal: no change in bowel habits and no blood in stools.   Genitourinary: no urinary frequency and no hematuria.   Skin: no skin rashes.   Neurological: no seizures and no frequent falls.   Psychiatric: no depression and not suicidal.   All other systems have been reviewed and are negative for complaint.     Outpatient Medications:  Current Outpatient Medications   Medication Instructions    cetirizine (ZYRTEC) 10 mg, oral, Daily    fluticasone (Flonase) 50 mcg/actuation nasal spray 1 spray, Each Nostril, 2 times daily, Shake gently. Before first use, prime pump. After use, clean tip and replace cap.    hydroxychloroquine (Plaquenil) 200 mg tablet 1 tablet, Daily    triamcinolone (Kenalog) 0.1 % cream Topical, 2 times daily    Zafemy 150-35 mcg/24 hr 1 patch         Last Recorded Vitals:      3/10/2025     4:51 PM 3/18/2025     2:01 PM 3/31/2025     8:23 AM 4/4/2025     1:37 PM 4/14/2025    11:15 AM 4/22/2025     9:21 AM 4/22/2025     3:07 PM   Vitals   Systolic 108 107 105 125 109 127 113   Diastolic 73 62 69 98 74 78 79   Heart Rate 79 70 79 78 92  84   Temp 37.2 °C (99 °F)   36.5 °C (97.7 °F) 37.6 °C (99.7 °F)  36.8 °C (98.2 °F)   Resp 17  18 18 16  17   Height 1.524 m (5')  1.524 m (5')  1.524 m (5') 1.524 m (5') 1.524 m (5')   Weight (lb) 107 105 109.8  105 105 108   BMI 20.9 kg/m2 20.51 kg/m2 21.44 kg/m2  20.51 kg/m2 20.51 kg/m2 21.09  kg/m2   BSA (m2) 1.43 m2 1.42 m2 1.45 m2  1.42 m2 1.42 m2 1.44 m2   Visit Report Report  Report  Report Report    Report Report    Report    Visit Vitals  Smoking Status Never        Physical Exam:  Constitutional: alert and in no acute distress.   Eyes: no erythema, swelling or discharge from the eye .   Neck: neck is supple, symmetric, trachea midline, no masses  and no thyromegaly .   Pulmonary: no increased work of breathing or signs of respiratory distress  and lungs clear to auscultation.    Cardiovascular: carotid pulses 2+ bilaterally with no bruit , JVP was normal, no thrills , regular rhythm, normal S1 and S2, no murmurs , pedal pulses 2+ bilaterally  and no edema .   Abdomen: abdomen non-tender, no masses  and no hepatomegaly .   Skin: skin warm and dry, normal skin turgor .   Psychiatric judgment and insight is normal  and oriented to person, place and time .        Assessment/Plan   Problem List Items Addressed This Visit           ICD-10-CM    Syncope and collapse - Primary R55    Relevant Orders    ECG 12 lead (Clinic Performed)       Essence Almodovar is a 25 y.o. year old female patient with h/o syncope. Presents for follow up with study results.   There patient underwent a Tilt table test which was abnormal consistent with diagnosis of POTS. Her current ECG shows NSR with narrow QRS and HR of 75 bpm. Will get her started on metoprolol 25 mg a day. She has an appointment to the neurology autonomic clinic in August. Will see her in 2 months to evaluate response to the beta blockers.         Dl Ott MD  Cardiac Electrophysiology      Thank you very much for allowing me to participate in the care of this pleasant patient. Please do not hesitate to contact me with any further questions or concerns regarding his care.    **Disclaimer: This note was dictated by speech recognition, and every effort has been made to prevent any error in transcription, however minor errors may be present**         [1]    Allergies  Allergen Reactions    Pineapple Anaphylaxis, Unknown and Swelling    Amoxicillin Hives     Patient does not want testing    Azithromycin Diarrhea     Pt reports severe diarrhea and yellowing of her skin with the last course of azithromycin that she took 1/10/2025

## 2025-05-05 DIAGNOSIS — L50.9 URTICARIA: Primary | ICD-10-CM

## 2025-05-14 DIAGNOSIS — L20.9 ATOPIC DERMATITIS, UNSPECIFIED TYPE: Primary | ICD-10-CM

## 2025-05-14 DIAGNOSIS — L20.89 OTHER ATOPIC DERMATITIS: ICD-10-CM

## 2025-05-14 RX ORDER — TRIAMCINOLONE ACETONIDE 1 MG/G
OINTMENT TOPICAL 2 TIMES DAILY
Qty: 453 G | Refills: 0 | Status: SHIPPED | OUTPATIENT
Start: 2025-05-14 | End: 2026-05-14

## 2025-05-14 RX ORDER — TACROLIMUS 1 MG/G
OINTMENT TOPICAL 2 TIMES DAILY PRN
Qty: 100 G | Refills: 0 | Status: SHIPPED | OUTPATIENT
Start: 2025-05-14

## 2025-05-14 RX ORDER — PREDNISONE 20 MG/1
TABLET ORAL
Qty: 20 TABLET | Refills: 0 | Status: SHIPPED | OUTPATIENT
Start: 2025-05-14 | End: 2025-05-26

## 2025-05-17 LAB
25(OH)D3+25(OH)D2 SERPL-MCNC: 26 NG/ML (ref 30–100)
ALBUMIN SERPL-MCNC: 4.7 G/DL (ref 3.6–5.1)
ALP SERPL-CCNC: 41 U/L (ref 31–125)
ALT SERPL-CCNC: 9 U/L (ref 6–29)
ANA SER QL IF: NORMAL
ANION GAP SERPL CALCULATED.4IONS-SCNC: 13 MMOL/L (CALC) (ref 7–17)
AST SERPL-CCNC: 19 U/L (ref 10–30)
BASOPHILS # BLD AUTO: 20 CELLS/UL (ref 0–200)
BASOPHILS NFR BLD AUTO: 0.2 %
BILIRUB SERPL-MCNC: 0.3 MG/DL (ref 0.2–1.2)
BUN SERPL-MCNC: 13 MG/DL (ref 7–25)
CALCIUM SERPL-MCNC: 9.7 MG/DL (ref 8.6–10.2)
CHLORIDE SERPL-SCNC: 105 MMOL/L (ref 98–110)
CO2 SERPL-SCNC: 20 MMOL/L (ref 20–32)
CREAT SERPL-MCNC: 0.61 MG/DL (ref 0.5–0.96)
EGFRCR SERPLBLD CKD-EPI 2021: 128 ML/MIN/1.73M2
EOSINOPHIL # BLD AUTO: 0 CELLS/UL (ref 15–500)
EOSINOPHIL NFR BLD AUTO: 0 %
ERYTHROCYTE [DISTWIDTH] IN BLOOD BY AUTOMATED COUNT: 12.5 % (ref 11–15)
GLUCOSE SERPL-MCNC: 164 MG/DL (ref 65–139)
HCT VFR BLD AUTO: 39 % (ref 35–45)
HGB BLD-MCNC: 13 G/DL (ref 11.7–15.5)
IGE SERPL-ACNC: NORMAL [IU]/L
LYMPHOCYTES # BLD AUTO: 768 CELLS/UL (ref 850–3900)
LYMPHOCYTES NFR BLD AUTO: 7.6 %
MCH RBC QN AUTO: 30.7 PG (ref 27–33)
MCHC RBC AUTO-ENTMCNC: 33.3 G/DL (ref 32–36)
MCV RBC AUTO: 92.2 FL (ref 80–100)
MONOCYTES # BLD AUTO: 121 CELLS/UL (ref 200–950)
MONOCYTES NFR BLD AUTO: 1.2 %
NEUTROPHILS # BLD AUTO: 9191 CELLS/UL (ref 1500–7800)
NEUTROPHILS NFR BLD AUTO: 91 %
PLATELET # BLD AUTO: 437 THOUSAND/UL (ref 140–400)
PMV BLD REES-ECKER: 9.1 FL (ref 7.5–12.5)
POTASSIUM SERPL-SCNC: 4 MMOL/L (ref 3.5–5.3)
PROT SERPL-MCNC: 7.8 G/DL (ref 6.1–8.1)
QUEST CD203C (PERCENT OF BASOPHILS): NORMAL
QUEST COMMENT- ANTI-IGE RECEPTOR AB: NORMAL
QUEST INTERPRETATION- ANTI-IGE RECEPTOR AB: NORMAL
QUEST POPULATION- ANTI-IGE RECEPTOR AB: NORMAL
RBC # BLD AUTO: 4.23 MILLION/UL (ref 3.8–5.1)
SODIUM SERPL-SCNC: 138 MMOL/L (ref 135–146)
T4 FREE SERPL-MCNC: 1 NG/DL (ref 0.8–1.8)
THYROGLOB AB SERPL-ACNC: NORMAL [IU]/ML
THYROPEROXIDASE AB SERPL-ACNC: NORMAL [IU]/ML
TRYPTASE SERPL-MCNC: NORMAL NG/ML
TSH SERPL-ACNC: 0.34 MIU/L
WBC # BLD AUTO: 10.1 THOUSAND/UL (ref 3.8–10.8)

## 2025-05-21 LAB
25(OH)D3+25(OH)D2 SERPL-MCNC: 26 NG/ML (ref 30–100)
ALBUMIN SERPL-MCNC: 4.7 G/DL (ref 3.6–5.1)
ALP SERPL-CCNC: 41 U/L (ref 31–125)
ALT SERPL-CCNC: 9 U/L (ref 6–29)
ANA PAT SER IF-IMP: ABNORMAL
ANA SER QL IF: POSITIVE
ANA TITR SER IF: ABNORMAL TITER
ANION GAP SERPL CALCULATED.4IONS-SCNC: 13 MMOL/L (CALC) (ref 7–17)
AST SERPL-CCNC: 19 U/L (ref 10–30)
BASOPHILS # BLD AUTO: 20 CELLS/UL (ref 0–200)
BASOPHILS NFR BLD AUTO: 0.2 %
BILIRUB SERPL-MCNC: 0.3 MG/DL (ref 0.2–1.2)
BUN SERPL-MCNC: 13 MG/DL (ref 7–25)
CALCIUM SERPL-MCNC: 9.7 MG/DL (ref 8.6–10.2)
CENTROMERE B AB SER-ACNC: ABNORMAL AI
CHLORIDE SERPL-SCNC: 105 MMOL/L (ref 98–110)
CO2 SERPL-SCNC: 20 MMOL/L (ref 20–32)
CREAT SERPL-MCNC: 0.61 MG/DL (ref 0.5–0.96)
DSDNA AB SER-ACNC: <1 IU/ML
EGFRCR SERPLBLD CKD-EPI 2021: 128 ML/MIN/1.73M2
ENA JO1 AB SER IA-ACNC: ABNORMAL AI
ENA RNP AB SER-ACNC: ABNORMAL AI
ENA SCL70 AB SER IA-ACNC: ABNORMAL AI
ENA SM AB SER IA-ACNC: ABNORMAL AI
ENA SM+RNP AB SER IA-ACNC: ABNORMAL AI
ENA SS-A AB SER IA-ACNC: ABNORMAL AI
ENA SS-B AB SER IA-ACNC: ABNORMAL AI
EOSINOPHIL # BLD AUTO: 0 CELLS/UL (ref 15–500)
EOSINOPHIL NFR BLD AUTO: 0 %
ERYTHROCYTE [DISTWIDTH] IN BLOOD BY AUTOMATED COUNT: 12.5 % (ref 11–15)
GLUCOSE SERPL-MCNC: 164 MG/DL (ref 65–139)
HCT VFR BLD AUTO: 39 % (ref 35–45)
HGB BLD-MCNC: 13 G/DL (ref 11.7–15.5)
IGE SERPL-ACNC: 102 KU/L
LABORATORY COMMENT REPORT: ABNORMAL
LYMPHOCYTES # BLD AUTO: 768 CELLS/UL (ref 850–3900)
LYMPHOCYTES NFR BLD AUTO: 7.6 %
MCH RBC QN AUTO: 30.7 PG (ref 27–33)
MCHC RBC AUTO-ENTMCNC: 33.3 G/DL (ref 32–36)
MCV RBC AUTO: 92.2 FL (ref 80–100)
MONOCYTES # BLD AUTO: 121 CELLS/UL (ref 200–950)
MONOCYTES NFR BLD AUTO: 1.2 %
NEUTROPHILS # BLD AUTO: 9191 CELLS/UL (ref 1500–7800)
NEUTROPHILS NFR BLD AUTO: 91 %
NUCLEOSOME AB SER IA-ACNC: ABNORMAL AI
PLATELET # BLD AUTO: 437 THOUSAND/UL (ref 140–400)
PMV BLD REES-ECKER: 9.1 FL (ref 7.5–12.5)
POTASSIUM SERPL-SCNC: 4 MMOL/L (ref 3.5–5.3)
PROT SERPL-MCNC: 7.8 G/DL (ref 6.1–8.1)
QUEST CD203C (PERCENT OF BASOPHILS): NORMAL
QUEST COMMENT- ANTI-IGE RECEPTOR AB: NORMAL
QUEST INTERPRETATION- ANTI-IGE RECEPTOR AB: NORMAL
QUEST POPULATION- ANTI-IGE RECEPTOR AB: NORMAL
RBC # BLD AUTO: 4.23 MILLION/UL (ref 3.8–5.1)
RIBOSOMAL P AB SER-ACNC: ABNORMAL AI
SODIUM SERPL-SCNC: 138 MMOL/L (ref 135–146)
T4 FREE SERPL-MCNC: 1 NG/DL (ref 0.8–1.8)
THYROGLOB AB SERPL-ACNC: <1 IU/ML
THYROPEROXIDASE AB SERPL-ACNC: <1 IU/ML
TRYPTASE SERPL-MCNC: 3.1 MCG/L
TSH SERPL-ACNC: 0.34 MIU/L
WBC # BLD AUTO: 10.1 THOUSAND/UL (ref 3.8–10.8)

## 2025-05-30 NOTE — PROGRESS NOTES
"Subjective   Patient ID:   [id number]   Essence Almodovar is a 25 y.o. female who presents for Rash (Facial swelling x 4wks).    Chief Complaint   Patient presents with    Rash     Facial swelling x 4wks      Patient presents for three week F/U of chronic cough.    Since last visit, 4-22-25, patient reports a month ago, she started having rashes periorbitally.  She used Protopic, which made her itchy.  She woke up at 3 am with her left eye swollen shut and still red.  She went to [facility] and has been on steroids 3x.  This is the second day she has been on her 3rd course.  On Sunday, she woke up with yellow fluid draining from her right face and neck and had to sleep with a towel to her face to soak up the fluid.  Her left hand fingers web spaces are itchy and she has a spot on her left forearm that is improving.      Patient reports she improves, but Sx recur a few days after she stops the last tablet.  She had her hair colored in the winter and always uses the same dye.  She uses Equate moisturizer and sprays rosewater on her face twice a day.    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     Review of Systems   Eyes:  Positive for redness (left).   Skin:  Positive for rash (periorbital rashes, left eye swelling.  Left forearm spot, improving).        Yellow fluid draining from right face and neck since yesterday.  Itchy left finger web spaces.     Objective   Ht 1.651 m (5' 5\")   Wt 47.6 kg (105 lb)   BMI 17.47 kg/m²      Physical Exam  Constitutional:       Appearance: Normal appearance. She is normal weight.   HENT:      Head: Normocephalic and atraumatic.   Eyes:      Extraocular Movements: Extraocular movements intact.      Conjunctiva/sclera: Conjunctivae normal.      Pupils: Pupils are equal, round, and reactive to light.   Cardiovascular:      Rate and Rhythm: Normal rate and regular rhythm.   Pulmonary:      Effort: Pulmonary effort is normal.      Breath sounds: Normal breath sounds.   Skin:    "  General: Skin is warm and dry.      Findings: Rash (severe periorbital dermatitis and spot on left arm) present.      Comments: Severe periorbital dermatitis with eyelid edema.   Neurological:      Mental Status: She is alert.   Psychiatric:         Mood and Affect: Mood normal.         Behavior: Behavior normal.     Assessment/Plan     Skin infection  Sunday, she had to sleep with a towel due to yellow fluid draining from her right face and neck.      She will complete Keflex course.    Dermatitis  She C/O a month of periorbital erythematous rashes and left eye completely swollen shut.  Protopic made her itchy.  She went to [facility] and is on day 2 of her 3rd course of steroids.  She will improve but after a few days of completing her steroid, her Sx recur.     Exam shows severe periorbital dermatitis and a resolving spot on her left arm.  She has images showing left eye completely swollen shut and remains erythematous.    I would like her to only use Vaseline and start Protopic to face BID.    She will continue triamcinolone PRN 2 weeks on and 2 weeks off.    Start Elidel to affected areas BID.  I discussed that this has been known to burn some patients.  She will let me know if it does and I will switch her to Opzelura.    Switch shampoo to Vanicream Free & Clear Shampoo and conditioner and try Cetaphil foaming cleanser.  There is a generic available in [facility].      She will return after one month for patch testing to up to 10 personal products.    By signing my name below, I, Cathy Gregory, attest that this documentation has been prepared under the direction and in the presence of Jennifer Carpenter MD.  All medical record entries made by the Yenibe were at my direction and personally dictated by me. I have reviewed the chart and agree that the record accurately reflects my personal performance of the history, physical exam, discussion and plan.

## 2025-06-02 LAB
25(OH)D3+25(OH)D2 SERPL-MCNC: 26 NG/ML (ref 30–100)
ALBUMIN SERPL-MCNC: 4.7 G/DL (ref 3.6–5.1)
ALP SERPL-CCNC: 41 U/L (ref 31–125)
ALT SERPL-CCNC: 9 U/L (ref 6–29)
ANA PAT SER IF-IMP: ABNORMAL
ANA SER QL IF: POSITIVE
ANA TITR SER IF: ABNORMAL TITER
ANION GAP SERPL CALCULATED.4IONS-SCNC: 13 MMOL/L (CALC) (ref 7–17)
AST SERPL-CCNC: 19 U/L (ref 10–30)
BASOPHILS # BLD AUTO: 20 CELLS/UL (ref 0–200)
BASOPHILS NFR BLD AUTO: 0.2 %
BILIRUB SERPL-MCNC: 0.3 MG/DL (ref 0.2–1.2)
BUN SERPL-MCNC: 13 MG/DL (ref 7–25)
CALCIUM SERPL-MCNC: 9.7 MG/DL (ref 8.6–10.2)
CENTROMERE B AB SER-ACNC: ABNORMAL AI
CHLORIDE SERPL-SCNC: 105 MMOL/L (ref 98–110)
CO2 SERPL-SCNC: 20 MMOL/L (ref 20–32)
CREAT SERPL-MCNC: 0.61 MG/DL (ref 0.5–0.96)
DSDNA AB SER-ACNC: <1 IU/ML
EGFRCR SERPLBLD CKD-EPI 2021: 128 ML/MIN/1.73M2
ENA JO1 AB SER IA-ACNC: ABNORMAL AI
ENA RNP AB SER-ACNC: ABNORMAL AI
ENA SCL70 AB SER IA-ACNC: ABNORMAL AI
ENA SM AB SER IA-ACNC: ABNORMAL AI
ENA SM+RNP AB SER IA-ACNC: ABNORMAL AI
ENA SS-A AB SER IA-ACNC: ABNORMAL AI
ENA SS-B AB SER IA-ACNC: ABNORMAL AI
EOSINOPHIL # BLD AUTO: 0 CELLS/UL (ref 15–500)
EOSINOPHIL NFR BLD AUTO: 0 %
ERYTHROCYTE [DISTWIDTH] IN BLOOD BY AUTOMATED COUNT: 12.5 % (ref 11–15)
GLUCOSE SERPL-MCNC: 164 MG/DL (ref 65–139)
HCT VFR BLD AUTO: 39 % (ref 35–45)
HGB BLD-MCNC: 13 G/DL (ref 11.7–15.5)
IGE SERPL-ACNC: 102 KU/L
LABORATORY COMMENT REPORT: ABNORMAL
LYMPHOCYTES # BLD AUTO: 768 CELLS/UL (ref 850–3900)
LYMPHOCYTES NFR BLD AUTO: 7.6 %
MCH RBC QN AUTO: 30.7 PG (ref 27–33)
MCHC RBC AUTO-ENTMCNC: 33.3 G/DL (ref 32–36)
MCV RBC AUTO: 92.2 FL (ref 80–100)
MONOCYTES # BLD AUTO: 121 CELLS/UL (ref 200–950)
MONOCYTES NFR BLD AUTO: 1.2 %
NEUTROPHILS # BLD AUTO: 9191 CELLS/UL (ref 1500–7800)
NEUTROPHILS NFR BLD AUTO: 91 %
NUCLEOSOME AB SER IA-ACNC: ABNORMAL AI
PLATELET # BLD AUTO: 437 THOUSAND/UL (ref 140–400)
PMV BLD REES-ECKER: 9.1 FL (ref 7.5–12.5)
POTASSIUM SERPL-SCNC: 4 MMOL/L (ref 3.5–5.3)
PROT SERPL-MCNC: 7.8 G/DL (ref 6.1–8.1)
QUEST CD203C (PERCENT OF BASOPHILS): 8 % (ref 0–12)
QUEST INTERPRETATION- ANTI-IGE RECEPTOR AB: NORMAL
QUEST POPULATION- ANTI-IGE RECEPTOR AB: NORMAL
RBC # BLD AUTO: 4.23 MILLION/UL (ref 3.8–5.1)
RIBOSOMAL P AB SER-ACNC: ABNORMAL AI
SODIUM SERPL-SCNC: 138 MMOL/L (ref 135–146)
T4 FREE SERPL-MCNC: 1 NG/DL (ref 0.8–1.8)
THYROGLOB AB SERPL-ACNC: <1 IU/ML
THYROPEROXIDASE AB SERPL-ACNC: <1 IU/ML
TRYPTASE SERPL-MCNC: 3.1 MCG/L
TSH SERPL-ACNC: 0.34 MIU/L
WBC # BLD AUTO: 10.1 THOUSAND/UL (ref 3.8–10.8)

## 2025-06-03 ENCOUNTER — APPOINTMENT (OUTPATIENT)
Dept: ALLERGY | Facility: CLINIC | Age: 25
End: 2025-06-03
Payer: COMMERCIAL

## 2025-06-03 VITALS — HEIGHT: 65 IN | BODY MASS INDEX: 17.49 KG/M2 | WEIGHT: 105 LBS

## 2025-06-03 DIAGNOSIS — L08.9 SKIN INFECTION: Primary | ICD-10-CM

## 2025-06-03 DIAGNOSIS — L30.9 DERMATITIS: ICD-10-CM

## 2025-06-03 PROCEDURE — 99214 OFFICE O/P EST MOD 30 MIN: CPT | Performed by: ALLERGY & IMMUNOLOGY

## 2025-06-03 PROCEDURE — 3008F BODY MASS INDEX DOCD: CPT | Performed by: ALLERGY & IMMUNOLOGY

## 2025-06-03 PROCEDURE — 1036F TOBACCO NON-USER: CPT | Performed by: ALLERGY & IMMUNOLOGY

## 2025-06-03 RX ORDER — OMEPRAZOLE 40 MG/1
40 CAPSULE, DELAYED RELEASE ORAL
COMMUNITY
Start: 2025-05-30 | End: 2025-08-28

## 2025-06-03 RX ORDER — CEPHALEXIN 500 MG/1
500 CAPSULE ORAL 4 TIMES DAILY
Qty: 28 CAPSULE | Refills: 0 | Status: SHIPPED | OUTPATIENT
Start: 2025-06-03 | End: 2025-06-10

## 2025-06-03 RX ORDER — PIMECROLIMUS 10 MG/G
CREAM TOPICAL 2 TIMES DAILY
Qty: 60 G | Refills: 0 | Status: SHIPPED | OUTPATIENT
Start: 2025-06-03 | End: 2026-06-03

## 2025-06-03 RX ORDER — PREDNISONE 20 MG/1
TABLET ORAL
COMMUNITY
Start: 2025-06-01

## 2025-06-03 RX ORDER — FAMOTIDINE 20 MG/1
1 TABLET, FILM COATED ORAL
COMMUNITY
Start: 2025-06-01

## 2025-06-03 ASSESSMENT — ENCOUNTER SYMPTOMS: EYE REDNESS: 1

## 2025-06-03 NOTE — ASSESSMENT & PLAN NOTE
She C/O a month of periorbital erythematous rashes and left eye completely swollen shut.  Protopic made her itchy.  She went to [facility] and is on day 2 of her 3rd course of steroids.  She will improve but after a few days of completing her steroid, her Sx recur.     Exam shows severe periorbital dermatitis and a resolving spot on her left arm.  She has images showing left eye completely swollen shut and remains erythematous.    I would like her to only use Vaseline and start Protopic to face BID.    She will continue triamcinolone PRN 2 weeks on and 2 weeks off.    Start Elidel to affected areas BID.  I discussed that this has been known to burn some patients.  She will let me know if it does and I will switch her to Opzelura.    Switch shampoo to Vanicream Free & Clear Shampoo and conditioner and try Cetaphil foaming cleanser.  There is a generic available in [facility].      She will return after one month for patch testing to up to 10 personal products.

## 2025-06-03 NOTE — PATIENT INSTRUCTIONS
Complete Keflex course.    Use Vaseline to areas as needed.    Start Protopic (tacrolimus) to face twice a day.    Apply triamcinolone to affected areas for 2 weeks on and 2 weeks off.    Start Elidel to affected areas twice a day.  If it burns let me know, and I will switch prescription to Opzelura.    Switch shampoo to Vanicream Free & Clear Shampoo and conditioner.    Try Cetaphil foaming cleanser.  There is a generic available in [facility].      Return after one month for patch testing to up to 10 personal products.

## 2025-06-03 NOTE — ASSESSMENT & PLAN NOTE
Sunday, she had to sleep with a towel due to yellow fluid draining from her right face and neck.      She will complete Keflex course.

## 2025-06-06 NOTE — PROGRESS NOTES
"Patient liaison with   Discussed Pap smear screening at length  Patient denies history of sexual abuse  Encouraged her to try tampons    Last gyn: In [place]; no prior Paps; told by gynecologist that she was \"small down there\"; declines 1 today  Pap: None  Mammogram: Not applicable  Colonoscopy: Not applicable  Contraception: Patch    CONSTITUTIONAL: Alert and in no acute distress. Well developed, well nourished.   HEAD AND FACE: Head and face: Normal.   EYES: Normal external exam - nonicteric sclera, extraocular movements intact (EOMI) and no ptosis.   EARS, NOSE, MOUTH, AND THROAT: External inspection of ears and nose: Normal.   BREASTS: No masses tenderness nipple discharge or axillary nodes  ABDOMEN: Soft, nontender  GENITOURINARY: Patient declines    MUSCULOSKELETAL: No joint swelling seen, normal movements of all extremities.   SKIN: Normal skin color and pigmentation, normal skin turgor, and no rash.   NEUROLOGIC: Non-focal. Grossly intact.        PSYCHIATRIC: Alert and oriented x 3. Affect normal to patient baseline. Mood: Appropriate.     Assessment/plan:   Unremarkable well woman exam  Discussed relaxation techniques for vagina         "

## 2025-06-09 ENCOUNTER — APPOINTMENT (OUTPATIENT)
Facility: CLINIC | Age: 25
End: 2025-06-09
Payer: COMMERCIAL

## 2025-06-17 ENCOUNTER — OFFICE VISIT (OUTPATIENT)
Dept: OBSTETRICS AND GYNECOLOGY | Facility: CLINIC | Age: 25
End: 2025-06-17
Payer: COMMERCIAL

## 2025-06-17 ENCOUNTER — APPOINTMENT (OUTPATIENT)
Dept: NEUROLOGY | Facility: HOSPITAL | Age: 25
End: 2025-06-17
Payer: COMMERCIAL

## 2025-06-17 ENCOUNTER — APPOINTMENT (OUTPATIENT)
Dept: ALLERGY | Facility: CLINIC | Age: 25
End: 2025-06-17
Payer: COMMERCIAL

## 2025-06-17 VITALS
WEIGHT: 111 LBS | BODY MASS INDEX: 21.79 KG/M2 | DIASTOLIC BLOOD PRESSURE: 58 MMHG | HEIGHT: 60 IN | SYSTOLIC BLOOD PRESSURE: 110 MMHG

## 2025-06-17 DIAGNOSIS — Z30.45 ENCOUNTER FOR SURVEILLANCE OF TRANSDERMAL PATCH HORMONAL CONTRACEPTIVE DEVICE: ICD-10-CM

## 2025-06-17 DIAGNOSIS — Z00.00 WELL WOMAN EXAM WITHOUT GYNECOLOGICAL EXAM: Primary | ICD-10-CM

## 2025-06-17 PROCEDURE — 99385 PREV VISIT NEW AGE 18-39: CPT | Performed by: OBSTETRICS & GYNECOLOGY

## 2025-06-17 PROCEDURE — 3008F BODY MASS INDEX DOCD: CPT | Performed by: OBSTETRICS & GYNECOLOGY

## 2025-06-17 PROCEDURE — 1036F TOBACCO NON-USER: CPT | Performed by: OBSTETRICS & GYNECOLOGY

## 2025-06-17 RX ORDER — NORELGESTROMIN AND ETHINYL ESTRADIOL 35; 150 UG/D; UG/D
PATCH TRANSDERMAL
Qty: 12 PATCH | Refills: 3 | Status: SHIPPED | OUTPATIENT
Start: 2025-06-17

## 2025-06-17 ASSESSMENT — PAIN SCALES - GENERAL: PAINLEVEL_OUTOF10: 0-NO PAIN

## 2025-06-18 ENCOUNTER — PATIENT MESSAGE (OUTPATIENT)
Dept: PRIMARY CARE | Facility: CLINIC | Age: 25
End: 2025-06-18
Payer: COMMERCIAL

## 2025-06-18 ENCOUNTER — TELEPHONE (OUTPATIENT)
Dept: OBSTETRICS AND GYNECOLOGY | Facility: CLINIC | Age: 25
End: 2025-06-18
Payer: COMMERCIAL

## 2025-06-18 DIAGNOSIS — R11.0 NAUSEA: ICD-10-CM

## 2025-06-18 RX ORDER — ONDANSETRON 4 MG/1
4 TABLET, ORALLY DISINTEGRATING ORAL EVERY 8 HOURS PRN
Qty: 30 TABLET | Refills: 0 | Status: SHIPPED | OUTPATIENT
Start: 2025-06-18 | End: 2025-06-28

## 2025-06-18 NOTE — TELEPHONE ENCOUNTER
Pharmacist calling to confirm sig on order they received yesterday for birth control patch.  Pt is to use patch continuously.  She is sto skip placebo week.

## 2025-06-18 NOTE — PATIENT COMMUNICATION
Pt last OV 4/22/2025    Requested Prescriptions     Pending Prescriptions Disp Refills    ondansetron ODT (Zofran-ODT) 4 mg disintegrating tablet 30 tablet 0     Sig: Dissolve 1 tablet (4 mg) in the mouth every 8 hours if needed for nausea or vomiting for up to 10 days.

## 2025-06-26 NOTE — PROGRESS NOTES
Subjective   Patient ID:   [id number]   Essence Almodovar is a 25 y.o. female who presents for Rash.    Chief Complaint   Patient presents with    Rash      Patient presents for TRUE and personal patch tests placement.    In March 2025, patient developed random periorbital rashes.  She used Protopic, which made her itch.  She woke up at 3 am with her left eye swollen shut and red.  She went to [facility] and given steroids 3x with no improvement.    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     True test placed    EMULSIFIERS  AMERCHOL L101, 50%  AMIDOAMINE (STEARAMIDOPROPYL DIMETHYLAMINE), 0.1%  CETYLSTEARYLALCOHOL, 20%  CETEARYL GLUCOSIDE, 5%  COCAMIDOPROPYL BETAINE, 1%  Cobalt  COCONUT DIETHANOLAMIDE, 0.5%  DECYL GLUCOSIDE, 5%  DIETHANOLAMINE, 2%  DIMETHYLAMINOPROPYLAMINE (DMAPA), 1%  DIMETHYLAMINOPROPYLAMINE, 1%  ETHYL HEXYL GLYCEROL, 5%  ISOPROPYLMYRISTATE, 10%  LANOLIN ALCOHOL, 30%  LAURYL GLYCOSIDE (2 ML), 3%  Nickel  Titanium  OLEAMIDOPROPYL DIMETHYLAMINE, 0.1%  PETROLATUM, 100%  POLYETHYLENE GLYCOL OINTMENT, 100%  PROPYLENE GLYCOL, 20%  PROPYLENE GLYCOL, 30%  PROPYLENE GLYCOL, 100%  POLYETHYLENE GLYCOL-400, 100%  POLYSORBATE 80, 10%  SODIUM LAURYL SULFATE (SLS), 0.25%  SORBITAN MONOOLEATE (SPAN 80), 5%  SORBITAN SESQUIOLEATE, 20%  STEARYL ALCOHOL, 30%  TRIETHANOLAMINE, 2.5%  WOOL ALCOHOLS OINTMENT, 100%  WOOL FAT, 30%  PERSONAL PRODUCTS INCLUDE:  ALL EYES IN ME  CONDITIONER  HAIRSPRAY  HEAD & SHOULDERS  JUST FOR RED HEADS  MASCARA  MAMADOU WATER  SHAMPOO  ULTRA HYDRATING    Personal products:      Assessment/Plan     Dermatitis  Patch placed. Patient will return in 48 for removal and 72 hours for read    By signing my name below, I, Cathy Gregory, attest that this documentation has been prepared under the direction and in the presence of Jennifer Carpenter MD.  All medical record entries made by the Scribe were at my direction and personally dictated by me. I have reviewed the chart and agree  that the record accurately reflects my personal performance of the history, physical exam, discussion and plan.

## 2025-06-30 ENCOUNTER — APPOINTMENT (OUTPATIENT)
Dept: ALLERGY | Facility: CLINIC | Age: 25
End: 2025-06-30
Payer: COMMERCIAL

## 2025-06-30 DIAGNOSIS — L30.9 DERMATITIS: Primary | ICD-10-CM

## 2025-06-30 PROCEDURE — 95044 PATCH/APPLICATION TESTS: CPT | Performed by: ALLERGY & IMMUNOLOGY

## 2025-06-30 NOTE — PATIENT INSTRUCTIONS
TRUE and patch tests placed today to personal products including:      Keep site dry.  Remove patches in two days and follow-up on Thursday at [place] office for patch read.

## 2025-07-01 NOTE — PROGRESS NOTES
Subjective   Patient ID:   [id number]   Essence Almodovar is a 25 y.o. female who presents for patch test removal.    Chief Complaint   Patient presents with    patch test removal      Patient presents for TRUE and personal patch tests read.    In March 2025, patient developed random periorbital rashes.  She used Protopic, which made her itch.  She woke up at 3 am with her left eye swollen shut and red.  She went to [facility] and given steroids 3x with no improvement.  On 6-30-25, he had TRUE and patches placed to personal products and presents for read today.    EMULSIFIERS    AMERCHOL L101, 50%  AMIDOAMINE (STEARAMIDOPROPYL DIMETHYLAMINE), 0.1%  CETYLSTEARYLALCOHOL, 20%  CETEARYL GLUCOSIDE, 5%  COCAMIDOPROPYL BETAINE, 1%  Cobalt  COCONUT DIETHANOLAMIDE, 0.5%  DECYL GLUCOSIDE, 5%  DIETHANOLAMINE, 2%  DIMETHYLAMINOPROPYLAMINE (DMAPA), 1%  DIMETHYLAMINOPROPYLAMINE, 1%  ETHYL HEXYL GLYCEROL, 5%  ISOPROPYLMYRISTATE, 10%  LANOLIN ALCOHOL, 30%  LAURYL GLYCOSIDE (2 ML), 3%  Nickel  Titanium  OLEAMIDOPROPYL DIMETHYLAMINE, 0.1%  PETROLATUM, 100%  POLYETHYLENE GLYCOL OINTMENT, 100%  PROPYLENE GLYCOL, 20%  PROPYLENE GLYCOL, 30%  PROPYLENE GLYCOL, 100%  POLYETHYLENE GLYCOL-400, 100%  POLYSORBATE 80, 10%  SODIUM LAURYL SULFATE (SLS), 0.25%  SORBITAN MONOOLEATE (SPAN 80), 5%  SORBITAN SESQUIOLEATE, 20%  STEARYL ALCOHOL, 30%  TRIETHANOLAMINE, 2.5%  WOOL ALCOHOLS OINTMENT, 100%  WOOL FAT, 30%    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       Assessment/Plan     Dermatitis  Patches were removed by SR.     By signing my name below, I, Cathy Gregory, attest that this documentation has been prepared under the direction and in the presence of Jennifer Carpenter MD.  All medical record entries made by the Scribe were at my direction and personally dictated by me. I have reviewed the chart and agree that the record accurately reflects my personal performance of the history, physical exam, discussion and plan.

## 2025-07-02 ENCOUNTER — OFFICE VISIT (OUTPATIENT)
Dept: CARDIOLOGY | Facility: HOSPITAL | Age: 25
End: 2025-07-02
Payer: COMMERCIAL

## 2025-07-02 ENCOUNTER — OFFICE VISIT (OUTPATIENT)
Dept: ALLERGY | Facility: CLINIC | Age: 25
End: 2025-07-02
Payer: COMMERCIAL

## 2025-07-02 DIAGNOSIS — L30.9 DERMATITIS: Primary | ICD-10-CM

## 2025-07-02 DIAGNOSIS — R55 SYNCOPE AND COLLAPSE: Primary | ICD-10-CM

## 2025-07-02 PROCEDURE — 1036F TOBACCO NON-USER: CPT | Performed by: ALLERGY & IMMUNOLOGY

## 2025-07-02 PROCEDURE — 1036F TOBACCO NON-USER: CPT | Performed by: INTERNAL MEDICINE

## 2025-07-02 PROCEDURE — 99213 OFFICE O/P EST LOW 20 MIN: CPT | Performed by: INTERNAL MEDICINE

## 2025-07-02 PROCEDURE — 99212 OFFICE O/P EST SF 10 MIN: CPT | Mod: 25

## 2025-07-02 PROCEDURE — 93005 ELECTROCARDIOGRAM TRACING: CPT | Performed by: INTERNAL MEDICINE

## 2025-07-02 PROCEDURE — 93010 ELECTROCARDIOGRAM REPORT: CPT | Performed by: INTERNAL MEDICINE

## 2025-07-02 ASSESSMENT — ENCOUNTER SYMPTOMS
DEPRESSION: 0
OCCASIONAL FEELINGS OF UNSTEADINESS: 0
LOSS OF SENSATION IN FEET: 0

## 2025-07-02 NOTE — PROGRESS NOTES
Referred by Dr. Wang ref. provider found provider found for   Chief Complaint   Patient presents with    Syncope        Essence Almodovar is a 25 y.o. year old female patient with h/o syncope with recent diagnosis of POTS. During the last visit the patient was started on metoprolol. Presents today for follow up.     PMHx/PSHx: As above    FamHx: unremarkable     Allergies:  RX Allergies[1]     Review of Systems    Constitutional: not feeling tired.   Eyes: no eyesight problems.   ENT: no hearing loss and no nosebleeds.   Cardiovascular: no intermittent leg claudication and as noted in HPI.   Respiratory: no chronic cough and no shortness of breath.   Gastrointestinal: no change in bowel habits and no blood in stools.   Genitourinary: no urinary frequency and no hematuria.   Skin: no skin rashes.   Neurological: no seizures and no frequent falls.   Psychiatric: no depression and not suicidal.   All other systems have been reviewed and are negative for complaint.     Outpatient Medications:  Current Outpatient Medications   Medication Instructions    cetirizine (ZYRTEC) 10 mg, oral, Daily    fluticasone (Flonase) 50 mcg/actuation nasal spray 1 spray, Each Nostril, 2 times daily, Shake gently. Before first use, prime pump. After use, clean tip and replace cap.    hydroxychloroquine (Plaquenil) 200 mg tablet 1 tablet, Daily    metoprolol succinate XL (TOPROL-XL) 25 mg, oral, Daily, Do not crush or chew.    omeprazole (PRILOSEC) 40 mg, Daily RT    pimecrolimus (Elidel) 1 % cream Topical, 2 times daily    tacrolimus (Protopic) 0.1 % ointment Topical, 2 times daily PRN    Zafemy 150-35 mcg/24 hr Use continuously         Last Recorded Vitals:      4/4/2025     1:37 PM 4/14/2025    11:15 AM 4/22/2025     9:21 AM 4/22/2025     3:07 PM 4/28/2025     9:57 AM 6/3/2025     9:24 AM 6/17/2025     8:14 AM   Vitals   Systolic 125 109 127 113 98  110   Diastolic 98 74 78 79 53  58   BP Location     Left arm     Heart Rate 78 92  84 75    "  Temp 36.5 °C (97.7 °F) 37.6 °C (99.7 °F)  36.8 °C (98.2 °F)      Resp 18 16  17      Height  1.524 m (5') 1.524 m (5') 1.524 m (5') 1.651 m (5' 5\") 1.651 m (5' 5\") 1.524 m (5')   Weight (lb)  105 105 108 105 105 111   BMI  20.51 kg/m2 20.51 kg/m2 21.09 kg/m2 17.47 kg/m2 17.47 kg/m2 21.68 kg/m2   BSA (m2)  1.42 m2 1.42 m2 1.44 m2 1.48 m2 1.48 m2 1.46 m2   Visit Report  Report Report    Report Report    Report Report Report Report    Visit Vitals  LMP  (LMP Unknown)   OB Status OCP   Smoking Status Never        Physical Exam:  Constitutional: alert and in no acute distress.   Eyes: no erythema, swelling or discharge from the eye .   Neck: neck is supple, symmetric, trachea midline, no masses  and no thyromegaly .   Pulmonary: no increased work of breathing or signs of respiratory distress  and lungs clear to auscultation.    Cardiovascular: carotid pulses 2+ bilaterally with no bruit , JVP was normal, no thrills , regular rhythm, normal S1 and S2, no murmurs , pedal pulses 2+ bilaterally  and no edema .   Abdomen: abdomen non-tender, no masses  and no hepatomegaly .   Skin: skin warm and dry, normal skin turgor .   Psychiatric judgment and insight is normal  and oriented to person, place and time .        Assessment/Plan   Problem List Items Addressed This Visit           ICD-10-CM    Syncope and collapse - Primary R55       Essence Almodovar is a 25 y.o. year old female patient with h/o syncope with recent diagnosis of POTS. During the last visit the patient was started on metoprolol. Presents today for follow up.   Her current ECG shows NSR with narrow QRS and HR of 81 bpm. She reports significant improvement with the metoprolol. She says she \"regain quality of life since started the medication\". No more syncopes since then. Romulo tabor appointment with autonomic clinic in August. Will see her after that appointment for follow up.         Dl Ott MD  Cardiac Electrophysiology      Thank you very much for allowing me " to participate in the care of this pleasant patient. Please do not hesitate to contact me with any further questions or concerns regarding his care.    **Disclaimer: This note was dictated by speech recognition, and every effort has been made to prevent any error in transcription, however minor errors may be present**         [1]   Allergies  Allergen Reactions    Pineapple Anaphylaxis, Unknown and Swelling    Amoxicillin Hives     Patient does not want testing    Azithromycin Diarrhea     Pt reports severe diarrhea and yellowing of her skin with the last course of azithromycin that she took 1/10/2025

## 2025-07-03 ENCOUNTER — APPOINTMENT (OUTPATIENT)
Dept: ALLERGY | Facility: CLINIC | Age: 25
End: 2025-07-03
Payer: COMMERCIAL

## 2025-07-03 DIAGNOSIS — L30.9 DERMATITIS: Primary | ICD-10-CM

## 2025-07-03 LAB
ATRIAL RATE: 81 BPM
P AXIS: 71 DEGREES
P OFFSET: 208 MS
P ONSET: 157 MS
PR INTERVAL: 126 MS
Q ONSET: 220 MS
QRS COUNT: 13 BEATS
QRS DURATION: 86 MS
QT INTERVAL: 380 MS
QTC CALCULATION(BAZETT): 441 MS
QTC FREDERICIA: 419 MS
R AXIS: 86 DEGREES
T AXIS: 64 DEGREES
T OFFSET: 410 MS
VENTRICULAR RATE: 81 BPM

## 2025-07-03 PROCEDURE — 1036F TOBACCO NON-USER: CPT | Performed by: ALLERGY & IMMUNOLOGY

## 2025-07-03 PROCEDURE — 99214 OFFICE O/P EST MOD 30 MIN: CPT | Performed by: ALLERGY & IMMUNOLOGY

## 2025-07-03 NOTE — PROGRESS NOTES
Subjective   Patient ID:   [id number]   Essence Almodovar is a 25 y.o. female who presents for patch test read.    Chief Complaint   Patient presents with    patch test read      Patient presents for TRUE and personal patch tests read.    In March 2025, patient developed random periorbital rashes.  She used Protopic, which made her itch.  She woke up at 3 am with her left eye swollen shut and red.  She went to [facility] and given steroids 3x with no improvement.  On 6-30-25, he had TRUE and patches placed to personal products and presents for read today.    Patient has dermatitis on her face and neck and using Elidel once daily.  She is asking if she has a flare, should she restart Benadryl.    EMULSIFIERS:    AMERCHOL L101, 50%  AMIDOAMINE (STEARAMIDOPROPYL DIMETHYLAMINE), 0.1%  CETYLSTEARYLALCOHOL, 20%  CETEARYL GLUCOSIDE, 5%  COCAMIDOPROPYL BETAINE, 1%  Cobalt  COCONUT DIETHANOLAMIDE, 0.5%  DECYL GLUCOSIDE, 5%  DIETHANOLAMINE, 2%  DIMETHYLAMINOPROPYLAMINE (DMAPA), 1%  DIMETHYLAMINOPROPYLAMINE, 1% - 3+  ETHYL HEXYL GLYCEROL, 5%  ISOPROPYLMYRISTATE, 10%  LANOLIN ALCOHOL, 30%  LAURYL GLYCOSIDE (2 ML), 3%  Nickel  Titanium  OLEAMIDOPROPYL DIMETHYLAMINE, 0.1%  PETROLATUM, 100%  POLYETHYLENE GLYCOL OINTMENT, 100%  PROPYLENE GLYCOL, 20% - IR  PROPYLENE GLYCOL, 30%  PROPYLENE GLYCOL, 100%  POLYETHYLENE GLYCOL-400, 100%  POLYSORBATE 80, 10%  SODIUM LAURYL SULFATE (SLS), 0.25%  SORBITAN MONOOLEATE (SPAN 80), 5%  SORBITAN SESQUIOLEATE, 20%  STEARYL ALCOHOL, 30%  TRIETHANOLAMINE, 2.5%  WOOL ALCOHOLS OINTMENT, 100% - IR  WOOL FAT, 30%    Presumed negative unless otherwise stated    Patch testing was performed on Essence Almodovar using standard technique. Tests were read after 72 hours.     Test Results  Panel 1 Allergy Patch Antigens    1.  Nickel Sulfate: 4    2.  Wool Alcohols (Lanolin): 0    3.  Neomycin Sulfate: 0    4.  Potassium Dichromate: 0    5.  Freddy Mix: 0    6.  Fragrance Mix: 0    7.  Colophony: 2    8.  Paraben Mix: 0     "9.  Negative Control: 0  10.  Balsam of [place]: 0  11.  Ethylenediamine Dihydrochloride: 0  12.  Cobalt Dichloride: 0  Panel 2 Allergy Patch Antigens  13. p-tert Butylphenol Formaldehyde Resin: 2  14. Epoxy Resin: IR  15. Carba Mix: 0  16. Black Rubber Mix: 0  17. Cl + Me-Isothiazolinone: 0  18. Quaternium-16: 0  19. Methyldibromo Glutaronitrile: 0  20. p-Phenylenediamine: 0  21. Formaldehyde: 0  22. Mercapto Mix: 0  23. Thimerosal: 0  24. Thiuram Mix: 0  Panel 3 Allergy Patch Antigens  25. Diazolidinyl Urea: 0  26. Quinoline Mix: 0  27. Tixocortol-21-Pivalate: 0  28. Gold Sodium Thiosulfate: 0  29. Imidazolidinyl Urea: 0  30. Budesonide: 0  31. Hydrocortisone-17-Butyrate: 0  32. MBT (Mercaptobenzothiazole): 0  33. Bacitracin: 0  34. Parthenolide: 0  35. Disperse Blue 106: 0  36. 2-Bromo-2-Nitropropane-1,3-diol: 3+    Assessment/Plan     Dermatitis  Patches are positive to DIMETHYLAMINOPROPYLAMINE, 1%, nickel sulfate, colophony, p-tert Butylphenol Formaldehyde resin, 2-Bromo-2-Nitropropate-1,3-diol.    She was advised avoidance per handouts provided.    I recommend she download the Jannet \"SkinSAFE.\"    For her face and neck dermatitis, she will continue Elidel twice a day as needed.  She has moderate face and neck dermatitis at this time    If she has another reaction, she will start prednisone until we can get Dupixent authorized.    By signing my name below, I, Cathy Gregory, attest that this documentation has been prepared under the direction and in the presence of Jennifer Carpenter MD.  All medical record entries made by the Yenibe were at my direction and personally dictated by me. I have reviewed the chart and agree that the record accurately reflects my personal performance of the history, physical exam, discussion and plan.   "

## 2025-07-03 NOTE — ASSESSMENT & PLAN NOTE
"Patches are positive to DIMETHYLAMINOPROPYLAMINE, 1%, nickel sulfate, colophony, p-tert Butylphenol Formaldehyde resin, 2-Bromo-2-Nitropropate-1,3-diol.    She was advised avoidance per handouts provided.    I recommend she download the Jannet \"SkinSAFE.\"    For her face and neck dermatitis, she will continue Elidel twice a day as needed.  She has moderate face and neck dermatitis at this time    If she has another reaction, she will start prednisone until we can get Dupixent authorized.  "

## 2025-07-03 NOTE — PATIENT INSTRUCTIONS
"TRUE and patch tests to personal products are positive to:  DIMETHYLAMINOPROPYLAMINE, 1%, nickel sulfate, colophony, p-tert Butylphenol Formaldehyde resin, 2-Bromo-2-Nitropropate-1,3-diol.    Avoid these per handouts provided.  Be sure your glasses do not contain nickel.    I recommend you download the Jannet \"SkinSAFE.\"    Continue Elidel twice a day as needed.    If you have another reaction, we can start prednisone until we can get Dupixent authorized.  "

## 2025-07-10 ENCOUNTER — APPOINTMENT (OUTPATIENT)
Dept: CARDIOLOGY | Facility: HOSPITAL | Age: 25
End: 2025-07-10
Payer: COMMERCIAL

## 2025-07-10 ENCOUNTER — HOSPITAL ENCOUNTER (EMERGENCY)
Facility: HOSPITAL | Age: 25
Discharge: HOME | End: 2025-07-10
Attending: EMERGENCY MEDICINE
Payer: COMMERCIAL

## 2025-07-10 VITALS
TEMPERATURE: 97.2 F | BODY MASS INDEX: 21.6 KG/M2 | HEIGHT: 60 IN | SYSTOLIC BLOOD PRESSURE: 105 MMHG | OXYGEN SATURATION: 96 % | HEART RATE: 76 BPM | DIASTOLIC BLOOD PRESSURE: 71 MMHG | RESPIRATION RATE: 16 BRPM | WEIGHT: 110 LBS

## 2025-07-10 DIAGNOSIS — R07.89 ATYPICAL CHEST PAIN: Primary | ICD-10-CM

## 2025-07-10 DIAGNOSIS — R00.0 TACHYCARDIA: ICD-10-CM

## 2025-07-10 LAB
ALBUMIN SERPL BCP-MCNC: 4.5 G/DL (ref 3.4–5)
ALP SERPL-CCNC: 42 U/L (ref 33–110)
ALT SERPL W P-5'-P-CCNC: 10 U/L (ref 7–45)
ANION GAP SERPL CALC-SCNC: 17 MMOL/L (ref 10–20)
AST SERPL W P-5'-P-CCNC: 21 U/L (ref 9–39)
ATRIAL RATE: 85 BPM
BASOPHILS # BLD AUTO: 0.05 X10*3/UL (ref 0–0.1)
BASOPHILS NFR BLD AUTO: 0.5 %
BILIRUB DIRECT SERPL-MCNC: 0.1 MG/DL (ref 0–0.3)
BILIRUB SERPL-MCNC: 0.5 MG/DL (ref 0–1.2)
BNP SERPL-MCNC: 27 PG/ML (ref 0–99)
BUN SERPL-MCNC: 9 MG/DL (ref 6–23)
CALCIUM SERPL-MCNC: 9.4 MG/DL (ref 8.6–10.3)
CARDIAC TROPONIN I PNL SERPL HS: <3 NG/L (ref 0–13)
CARDIAC TROPONIN I PNL SERPL HS: <3 NG/L (ref 0–13)
CHLORIDE SERPL-SCNC: 104 MMOL/L (ref 98–107)
CO2 SERPL-SCNC: 22 MMOL/L (ref 21–32)
CREAT SERPL-MCNC: 0.65 MG/DL (ref 0.5–1.05)
D DIMER PPP FEU-MCNC: 274 NG/ML FEU
EGFRCR SERPLBLD CKD-EPI 2021: >90 ML/MIN/1.73M*2
EOSINOPHIL # BLD AUTO: 0.04 X10*3/UL (ref 0–0.7)
EOSINOPHIL NFR BLD AUTO: 0.4 %
ERYTHROCYTE [DISTWIDTH] IN BLOOD BY AUTOMATED COUNT: 11.6 % (ref 11.5–14.5)
GLUCOSE SERPL-MCNC: 77 MG/DL (ref 74–99)
HCT VFR BLD AUTO: 39.2 % (ref 36–46)
HGB BLD-MCNC: 12.8 G/DL (ref 12–16)
IMM GRANULOCYTES # BLD AUTO: 0.04 X10*3/UL (ref 0–0.7)
IMM GRANULOCYTES NFR BLD AUTO: 0.4 % (ref 0–0.9)
LYMPHOCYTES # BLD AUTO: 2.18 X10*3/UL (ref 1.2–4.8)
LYMPHOCYTES NFR BLD AUTO: 23.8 %
MCH RBC QN AUTO: 30 PG (ref 26–34)
MCHC RBC AUTO-ENTMCNC: 32.7 G/DL (ref 32–36)
MCV RBC AUTO: 92 FL (ref 80–100)
MONOCYTES # BLD AUTO: 0.61 X10*3/UL (ref 0.1–1)
MONOCYTES NFR BLD AUTO: 6.7 %
NEUTROPHILS # BLD AUTO: 6.25 X10*3/UL (ref 1.2–7.7)
NEUTROPHILS NFR BLD AUTO: 68.2 %
NRBC BLD-RTO: 0 /100 WBCS (ref 0–0)
P AXIS: 68 DEGREES
P OFFSET: 201 MS
P ONSET: 155 MS
PLATELET # BLD AUTO: 404 X10*3/UL (ref 150–450)
POTASSIUM SERPL-SCNC: 4.1 MMOL/L (ref 3.5–5.3)
PR INTERVAL: 130 MS
PROT SERPL-MCNC: 7.4 G/DL (ref 6.4–8.2)
Q ONSET: 220 MS
QRS COUNT: 14 BEATS
QRS DURATION: 80 MS
QT INTERVAL: 376 MS
QTC CALCULATION(BAZETT): 447 MS
QTC FREDERICIA: 422 MS
R AXIS: 78 DEGREES
RBC # BLD AUTO: 4.26 X10*6/UL (ref 4–5.2)
SODIUM SERPL-SCNC: 139 MMOL/L (ref 136–145)
T AXIS: 57 DEGREES
T OFFSET: 408 MS
VENTRICULAR RATE: 85 BPM
WBC # BLD AUTO: 9.2 X10*3/UL (ref 4.4–11.3)

## 2025-07-10 PROCEDURE — 96374 THER/PROPH/DIAG INJ IV PUSH: CPT

## 2025-07-10 PROCEDURE — 96361 HYDRATE IV INFUSION ADD-ON: CPT

## 2025-07-10 PROCEDURE — 85379 FIBRIN DEGRADATION QUANT: CPT | Performed by: EMERGENCY MEDICINE

## 2025-07-10 PROCEDURE — 2500000004 HC RX 250 GENERAL PHARMACY W/ HCPCS (ALT 636 FOR OP/ED): Performed by: EMERGENCY MEDICINE

## 2025-07-10 PROCEDURE — 80048 BASIC METABOLIC PNL TOTAL CA: CPT | Performed by: EMERGENCY MEDICINE

## 2025-07-10 PROCEDURE — 85025 COMPLETE CBC W/AUTO DIFF WBC: CPT | Performed by: EMERGENCY MEDICINE

## 2025-07-10 PROCEDURE — 36415 COLL VENOUS BLD VENIPUNCTURE: CPT | Performed by: EMERGENCY MEDICINE

## 2025-07-10 PROCEDURE — 82248 BILIRUBIN DIRECT: CPT | Performed by: EMERGENCY MEDICINE

## 2025-07-10 PROCEDURE — 84484 ASSAY OF TROPONIN QUANT: CPT | Performed by: EMERGENCY MEDICINE

## 2025-07-10 PROCEDURE — 93005 ELECTROCARDIOGRAM TRACING: CPT

## 2025-07-10 PROCEDURE — 83880 ASSAY OF NATRIURETIC PEPTIDE: CPT | Performed by: EMERGENCY MEDICINE

## 2025-07-10 PROCEDURE — 84443 ASSAY THYROID STIM HORMONE: CPT | Performed by: NURSE PRACTITIONER

## 2025-07-10 PROCEDURE — 84439 ASSAY OF FREE THYROXINE: CPT | Performed by: NURSE PRACTITIONER

## 2025-07-10 PROCEDURE — 99284 EMERGENCY DEPT VISIT MOD MDM: CPT | Mod: 25 | Performed by: EMERGENCY MEDICINE

## 2025-07-10 RX ORDER — PROCHLORPERAZINE EDISYLATE 5 MG/ML
10 INJECTION INTRAMUSCULAR; INTRAVENOUS ONCE
Status: COMPLETED | OUTPATIENT
Start: 2025-07-10 | End: 2025-07-10

## 2025-07-10 RX ADMIN — SODIUM CHLORIDE 1000 ML: 0.9 INJECTION, SOLUTION INTRAVENOUS at 10:28

## 2025-07-10 RX ADMIN — PROCHLORPERAZINE EDISYLATE 10 MG: 5 INJECTION INTRAMUSCULAR; INTRAVENOUS at 11:01

## 2025-07-10 ASSESSMENT — HEART SCORE
RISK FACTORS: 1-2 RISK FACTORS
AGE: <45
TROPONIN: LESS THAN OR EQUAL TO NORMAL LIMIT
ECG: NON-SPECIFIC REPOLARIZATION DISTURBANCE
HEART SCORE: 2
HISTORY: SLIGHTLY SUSPICIOUS

## 2025-07-10 ASSESSMENT — PAIN SCALES - GENERAL
PAINLEVEL_OUTOF10: 6
PAINLEVEL_OUTOF10: 5 - MODERATE PAIN

## 2025-07-10 ASSESSMENT — PAIN DESCRIPTION - DESCRIPTORS: DESCRIPTORS: DULL;SHARP

## 2025-07-10 ASSESSMENT — PAIN DESCRIPTION - PAIN TYPE: TYPE: ACUTE PAIN

## 2025-07-10 ASSESSMENT — PAIN DESCRIPTION - ORIENTATION: ORIENTATION: MID;UPPER;LEFT

## 2025-07-10 ASSESSMENT — PAIN DESCRIPTION - LOCATION: LOCATION: CHEST

## 2025-07-10 ASSESSMENT — PAIN - FUNCTIONAL ASSESSMENT: PAIN_FUNCTIONAL_ASSESSMENT: 0-10

## 2025-07-10 NOTE — ED TRIAGE NOTES
Pt to ED from work for left upper CP that started this morning. Pt states she has a history of Lupus and POTS. Pt states she was sitting when the CP started.

## 2025-07-10 NOTE — ED PROVIDER NOTES
Emergency Department Provider Note       HPI: []  25-year-old female history of lupus, POTS syndrome comes in with intermittent chest pain and tachycardia.  She states she had an episode yesterday self resolved this morning she had another episode of chest pain and tachycardia that is why she is here.  Feels nauseous.  No vomiting.  No diarrhea.  Chest pain is intermittent comes and goes.  Sharp.  Intermittent shortness of breath.  No syncope or near syncope no leg swelling.  She is on birth control.  Patient on metoprolol and Plaquenil, denies recent travel or hospitalization.  No history of PE in the past.    Past history: Lupus, POTS    Social: Patient denies current tobacco alcohol drug use.  REVIEW OF SYSTEMS:    GENERAL.: No weight loss, fatigue, anorexia, insomnia, fever.    EYES: No vision loss, double vision, drainage, eye pain.    ENT: No pharyngitis, dry mouth.    CARDIOPULMONARY: Positive for intermittent chest pain, positive for palpitations, syncope, near syncope. No shortness of breath, cough, hemoptysis.    GI: No abdominal pain, change in bowel habits, melena, hematemesis, hematochezia, nausea, vomiting, diarrhea.    : No discharge, dysuria, frequency, urgency, hematuria.    MS: No limb pain, joint pain, joint swelling.    SKIN: No rashes.    PSYCH: No depression, anxiety, suicidality, homicidality.    Review of systems is otherwise negative unless stated above or in history of present illness.  Social history, family history, allergies reviewed.  PHYSICAL EXAM:    GENERAL: Vitals noted, no distress. Alert and oriented  x 3. Non-toxic.      EENT: TMs clear. Posterior oropharynx unremarkable. No meningismus. No LAD.     NECK: Supple. Nontender. No midline tenderness.     CARDIAC: Regular, rate, rhythm. No murmurs rubs or gallops. No JVD    PULMONARY: Lungs clear bilaterally with good aeration. No wheezes rales or rhonchi. No respiratory distress.     ABDOMEN: Soft, nonsurgical. Nontender. No  peritoneal signs. Normoactive bowel sounds. No pulsatile masses.     EXTREMITIES: No peripheral edema. Negative Homans bilaterally, no cords.  2+ bounding pulses well-perfused.    SKIN: No rash. Intact.     NEURO: No focal neurologic deficits, NIH score of 0. Cranial nerves normal as tested from II through XII.     MEDICAL DECISION MAKING:    EKG on my interpretation shows normal sinus rhythm normal axis rate mid 80s with inverted T waves inferolateral leads which are abnormal but unchanged in prior EKGs.    CBC with differential chemistries LFTs are unremarkable, troponin x 2 negative, BNP normal, D-dimer negative,    Treatments: IV established and given IV fluids and intravenous Compazine      ED course: Repeat assessment feeling much better remains afebrile normotensive no tachycardia hypoxia    Impression: #1 atypical chest pain, #2 intermittent palpitations         Plan/MDM: 25-year-old white female with a history of POTS syndrome and lupus comes in with interval chest pain and palpitations with a very benign examination EKG abnormal but unchanged prior EKGs troponin x 2 is negative D-dimer negative my suspicion for ACS dissection pulm embolism STEMI NSTEMI is low.  Patient currently stable hemodynamic.  Feels much better will be discharged home and outpatient follow-up with primary doctor and her cardiologist and rheumatologist and strict return precaution.                                          Derian Newton MD  07/10/25 1255       Derian Newton MD  07/10/25 1255       Derian Newton MD  07/10/25 1255

## 2025-07-10 NOTE — ED NOTES
Pt. To ED room 11. Pt. C/o mild sharp and dull L sided chest pain that started yesterday. Denies SOB or difficulty breathing. Pt. States pain has not subsided. Pt. States hx of POTS and Lupus, c/o nausea, denies vomiting. Pt. Placed on continuous cardiac monitor, spo2 and intermittent BP monitoring.      Karoline Bustos RN  07/10/25 0965

## 2025-07-14 ENCOUNTER — APPOINTMENT (OUTPATIENT)
Dept: PRIMARY CARE | Facility: CLINIC | Age: 25
End: 2025-07-14
Payer: COMMERCIAL

## 2025-07-14 ENCOUNTER — PATIENT MESSAGE (OUTPATIENT)
Dept: PRIMARY CARE | Facility: CLINIC | Age: 25
End: 2025-07-14

## 2025-07-14 VITALS
HEART RATE: 90 BPM | BODY MASS INDEX: 21.6 KG/M2 | SYSTOLIC BLOOD PRESSURE: 106 MMHG | OXYGEN SATURATION: 100 % | WEIGHT: 110 LBS | DIASTOLIC BLOOD PRESSURE: 62 MMHG | HEIGHT: 60 IN | RESPIRATION RATE: 17 BRPM

## 2025-07-14 DIAGNOSIS — R00.2 PALPITATIONS: ICD-10-CM

## 2025-07-14 DIAGNOSIS — E05.90 SUBCLINICAL HYPERTHYROIDISM: ICD-10-CM

## 2025-07-14 DIAGNOSIS — R07.89 ATYPICAL CHEST PAIN: ICD-10-CM

## 2025-07-14 DIAGNOSIS — G90.A POTS (POSTURAL ORTHOSTATIC TACHYCARDIA SYNDROME): Primary | ICD-10-CM

## 2025-07-14 DIAGNOSIS — R11.14 BILIOUS VOMITING WITH NAUSEA: ICD-10-CM

## 2025-07-14 DIAGNOSIS — K21.9 GASTROESOPHAGEAL REFLUX DISEASE, UNSPECIFIED WHETHER ESOPHAGITIS PRESENT: ICD-10-CM

## 2025-07-14 DIAGNOSIS — R00.0 TACHYCARDIA: ICD-10-CM

## 2025-07-14 DIAGNOSIS — R10.13 EPIGASTRIC PAIN: ICD-10-CM

## 2025-07-14 LAB
T4 FREE SERPL-MCNC: 0.69 NG/DL (ref 0.61–1.12)
TSH SERPL-ACNC: 1.07 MIU/L (ref 0.44–3.98)

## 2025-07-14 PROCEDURE — 99214 OFFICE O/P EST MOD 30 MIN: CPT | Performed by: NURSE PRACTITIONER

## 2025-07-14 RX ORDER — ONDANSETRON 4 MG/1
4 TABLET, FILM COATED ORAL EVERY 8 HOURS PRN
COMMUNITY
End: 2025-07-14 | Stop reason: SDUPTHER

## 2025-07-14 RX ORDER — PANTOPRAZOLE SODIUM 40 MG/1
40 TABLET, DELAYED RELEASE ORAL DAILY
Qty: 30 TABLET | Refills: 1 | Status: SHIPPED | OUTPATIENT
Start: 2025-07-14 | End: 2025-09-12

## 2025-07-14 RX ORDER — ONDANSETRON 4 MG/1
4 TABLET, FILM COATED ORAL EVERY 8 HOURS PRN
Qty: 20 TABLET | Refills: 0 | Status: SHIPPED | OUTPATIENT
Start: 2025-07-14

## 2025-07-14 ASSESSMENT — ENCOUNTER SYMPTOMS
NAUSEA: 1
APPETITE CHANGE: 1
CHILLS: 0
TREMORS: 1
CONSTIPATION: 1
SHORTNESS OF BREATH: 0
PALPITATIONS: 1
ABDOMINAL DISTENTION: 0
COUGH: 0
UNEXPECTED WEIGHT CHANGE: 1
FATIGUE: 1

## 2025-07-14 NOTE — PROGRESS NOTES
Subjective   Essence Almodovar is a 25 y.o. female who presents for Follow-up.  Patient was seen at [facility] ED on 7/10/2025 for chest pain and tachycardia. She has a known h/o POTS. Pt states symptoms started the night before and did not improve with her routine dose of metoprolol. EKG in ER was abnormal, but reportedly unchanged from prior baseline.  ER labs included CBC w/ diff, BMP, hepatic function panel, troponins x 2, BNP, and D-dimer. All labs were normal.   Patient discharged home and advised to follow up with PCP, Cardiologist, and Rheumatology. Patient has an appointment with Rheumatology scheduled for 8/22/25.     HPI Pt is here requesting paperwork to be completed for clearance to return to work following this ER visit. The paperwork she brought in is actually for FMLA, which she does not yet qualify for, since she has not been at her current job yet. States 8/12/2024 was her start date.     She c/o additional symptoms including: extreme N/V, abdominal cramping, frequent feeling of needing to have BM, but feels constipated. States she did have a hard, green stool today. Also c/o extreme fatigue, muscle aches, indigestion. Also having more sneezing. Feels somewhat improved today. States every emesis and stool has been bright green. Pt. Also says she is having intolerance to heat. Noted her last TSH on 5/16/25 was low at 0.34, but FT4 was normal at 1.0.  Review of Systems   Constitutional:  Positive for appetite change (decreased), fatigue and unexpected weight change. Negative for chills.   Respiratory:  Negative for cough and shortness of breath.    Cardiovascular:  Positive for palpitations. Negative for chest pain and leg swelling.   Gastrointestinal:  Positive for constipation and nausea. Negative for abdominal distention.   Endocrine: Positive for heat intolerance.   Neurological:  Positive for tremors.   Objective     Physical Exam  Constitutional:       General: She is not in acute  distress.     Appearance: Normal appearance. She is not ill-appearing.   Cardiovascular:      Rate and Rhythm: Normal rate and regular rhythm.      Pulses: Normal pulses.      Heart sounds: No murmur heard.  Pulmonary:      Effort: Pulmonary effort is normal.      Breath sounds: Normal breath sounds.   Abdominal:      General: Abdomen is flat. Bowel sounds are normal. There is no distension.      Palpations: Abdomen is soft.      Tenderness: There is abdominal tenderness in the epigastric area and left upper quadrant. There is no right CVA tenderness, left CVA tenderness or guarding. Negative signs include Johnston's sign, Rovsing's sign and McBurney's sign.       Musculoskeletal:      Right lower leg: No edema.      Left lower leg: No edema.   Skin:     General: Skin is warm and dry.   Neurological:      Mental Status: She is alert and oriented to person, place, and time.   Psychiatric:         Mood and Affect: Mood normal.     /62   Pulse 90   Resp 17   Ht (!) 1.524 m (5')   Wt 49.9 kg (110 lb)   LMP  (LMP Unknown)   SpO2 100%   BMI 21.48 kg/m²     Assessment/Plan     Problem List Items Addressed This Visit       POTS (postural orthostatic tachycardia syndrome) - Primary    Overview   POTS confirmed by tilt table test 2/10/2025. Dr. tOt  Scheduled with [facility] 6/17/2025.          Atypical chest pain    Current Assessment & Plan   Reviewed EKGs from ER visit on 7/10/25 and prior EKGs from Dr. Ott's office on 7/2/25 and 4/28/2025.  There were ST-T wave abnormalities, but no significant changes. Pt advised to F/U w/ Dr. Ott.          Palpitations    Current Assessment & Plan   Occurs with each episode of POTS, along w/ nausea and sometimes vomiting.  Continue metoprolol as ordered.  Follow-up with Dr. Ott.         Gastroesophageal reflux disease    Current Assessment & Plan   Pt was not taking her omeprazole, stating she had difficulty swallowing the large pill size. Will switch to  pantoprazole 40 mg every day. Advised pt to take it routinely. F/U with GI regarding persistent symptoms.           Other Visit Diagnoses         Tachycardia        Relevant Orders    TSH (Completed)    T3    T4, free (Completed)      Subclinical hyperthyroidism        Relevant Orders    TSH (Completed)    T3    T4, free (Completed)      Bilious vomiting with nausea        Refilled PRN Zofran. Advised pt to F/U w/ GI for further eval/treatment. Pt. advised to resume regular use of PPI. Will changed to pantoprazole 40 mg QD.    Relevant Medications    ondansetron (Zofran) 4 mg tablet    pantoprazole (ProtoNix) 40 mg EC tablet      Epigastric pain        New/recent onset. Has not been using her omeprazole d/t difficulty swallowing. Advised to switch to pantoprazole. F/U w/ GI. Consider EGD.    Relevant Medications    pantoprazole (ProtoNix) 40 mg EC tablet

## 2025-07-14 NOTE — ASSESSMENT & PLAN NOTE
Reviewed EKGs from ER visit on 7/10/25 and prior EKGs from Dr. Ott's office on 7/2/25 and 4/28/2025.  There were ST-T wave abnormalities, but no significant changes. Pt advised to F/U w/ Dr. Ott.

## 2025-07-14 NOTE — ASSESSMENT & PLAN NOTE
Occurs with each episode of POTS, along w/ nausea and sometimes vomiting.  Continue metoprolol as ordered.  Follow-up with Dr. Ott.

## 2025-07-14 NOTE — ASSESSMENT & PLAN NOTE
Pt was not taking her omeprazole, stating she had difficulty swallowing the large pill size. Will switch to pantoprazole 40 mg every day. Advised pt to take it routinely. F/U with GI regarding persistent symptoms.

## 2025-07-21 ENCOUNTER — OFFICE VISIT (OUTPATIENT)
Dept: URGENT CARE | Age: 25
End: 2025-07-21
Payer: COMMERCIAL

## 2025-07-21 VITALS
BODY MASS INDEX: 21.6 KG/M2 | HEART RATE: 94 BPM | WEIGHT: 110 LBS | SYSTOLIC BLOOD PRESSURE: 122 MMHG | HEIGHT: 60 IN | DIASTOLIC BLOOD PRESSURE: 82 MMHG | RESPIRATION RATE: 20 BRPM | OXYGEN SATURATION: 99 % | TEMPERATURE: 98 F

## 2025-07-21 DIAGNOSIS — R11.0 NAUSEA: Primary | ICD-10-CM

## 2025-07-21 DIAGNOSIS — G90.A POTS (POSTURAL ORTHOSTATIC TACHYCARDIA SYNDROME): ICD-10-CM

## 2025-07-21 RX ORDER — METOCLOPRAMIDE 5 MG/1
5 TABLET ORAL 4 TIMES DAILY
Qty: 20 TABLET | Refills: 0 | Status: ON HOLD | OUTPATIENT
Start: 2025-07-21 | End: 2025-07-26

## 2025-07-21 ASSESSMENT — PAIN SCALES - GENERAL: PAINLEVEL_OUTOF10: 0-NO PAIN

## 2025-07-21 NOTE — PATIENT INSTRUCTIONS
Trial of a different medication to help with your stomach.  It is important that you start rehydrating with fluids including water or sports drinks such as Gatorade or Powerade.  If this medication does not help reduce his symptoms, it is advised to go to the emergency room for IV fluids.

## 2025-07-21 NOTE — PROGRESS NOTES
Subjective   Patient ID: Essence Almodovar is a 25 y.o. female. They present today with a chief complaint of Nausea (Having a POTS episode).    Patient disposition: ED    History of Present Illness  HPI  1.5 weeks of upset stomach, chest pain, feels rundown.  Recent diagnosis of POTS and tachycardia.  History of lupus.  Initially went to the ER about 1 week ago, workup was grossly negative.  Had follow-up with PCP and GI.  Using with POTS flareup takes her metoprolol and symptoms improved.  Past few days has not been able to tolerate much food or liquids, vomiting and nausea.  Felt feverish with no cold symptoms.  Was given Zofran tablets which she took 1 hour ago with no improvement of symptoms.  No recent travel.  No unusual food intake.  In the past has also taken first-generation antipsychotic antiemetic which caused her a lot of restlessness.      Past Medical History  Allergies as of 07/21/2025 - Reviewed 07/21/2025   Allergen Reaction Noted    Pineapple Anaphylaxis, Unknown, and Swelling 09/02/2020    Amoxicillin Hives 10/24/2024    Azithromycin Diarrhea 02/07/2025       Prescriptions Prior to Admission[1]     Current Medications[2]    Problem List[3]    Surgical History[4]     reports that she has never smoked. She has never used smokeless tobacco. She reports current alcohol use. She reports that she does not use drugs.    Review of Systems  As noted in HPI. ROS otherwise negative unless noted.       Objective    Vitals:    07/21/25 1213   BP: 122/82   BP Location: Left arm   Patient Position: Sitting   BP Cuff Size: Adult   Pulse: 94   Resp: 20   Temp: 36.7 °C (98 °F)   TempSrc: Oral   SpO2: 99%   Weight: 49.9 kg (110 lb)   Height: (!) 1.524 m (5')     No LMP recorded. (Menstrual status: OCP).  Unable to successfully perform orthostatic blood pressure measurements.  Physical Exam  Constitutional: vital signs reviewed. Well developed, well nourished. patient alert and patient without distress.   Afebrile.  Psych: Normal mood and affect  Head and Face: Normal and atraumatic.    Cardiovascular: Heart rate normal, normal S1 and S2, no gallops, no murmurs and no pericardial rub. Rhythm: Normal.  Pulmonary: No respiratory distress. Palpation of chest: Normal. Clear bilateral breath sounds.   Skin: Normal skin color and pigmentation, normal skin turgor, and no rash.  No upper or lower extremity edema.  Abdomen: Somewhat limited exam in supine position.  Patient states all pressure making her feel more nauseated.  Abdomen is soft with normal bowel sounds otherwise.  ENT: No cervical lymphadenopathy.  Slightly dry oral mucosa without any cracking present.        Procedures    Point of Care Test & Imaging Results from this visit           Diagnostic study results (if any) were reviewed.  (If applicable) personal preliminary radiology reading: None    Assessment/Plan   Allergies, medications, history, and pertinent labs/EKGs/Imaging reviewed.    Patient feels dizzy and nauseated when she stands up.  Unable to perform orthostatics.  Feels that this is a POTS episode but more severe than normal.  Review of ER visit from last week and labs.  As patient cannot tolerate much food or fluids today even after taking antiemetic.  Advised return to ER for IV fluids.  Patient acceptable to try a second medication for nausea and will try to rehydrate at home, if does not improve will go to the emergency room later today.  Patient is having family member drive her home.    Medical Decision Making  See note    Orders and Diagnoses  There are no diagnoses linked to this encounter.    Medical Admin Record      Follow Up Instructions  No follow-ups on file.    At time of discharge patient was clinically well-appearing and HDS for outpatient management. The patient and/or family was educated regarding diagnosis, supportive care, OTC and Rx medications. The patient and/or family was given the opportunity to ask questions prior to  discharge and all questions answered. They verbalized understanding of my discussion of the plans for treatment, expected course, indications to return to  or seek further evaluation in ED, and the need for timely follow up as directed.      [facility]           [1] (Not in a hospital admission)  [2]   Current Outpatient Medications   Medication Sig Dispense Refill    cetirizine (ZyrTEC) 10 mg tablet Take 1 tablet (10 mg) by mouth once daily. 30 tablet 0    fluticasone (Flonase) 50 mcg/actuation nasal spray Administer 1 spray into each nostril 2 times a day. Shake gently. Before first use, prime pump. After use, clean tip and replace cap. 16 g 0    hydroxychloroquine (Plaquenil) 200 mg tablet Take 1 tablet (200 mg) by mouth once daily.      metoprolol succinate XL (Toprol-XL) 25 mg 24 hr tablet Take 1 tablet (25 mg) by mouth once daily. Do not crush or chew. 30 tablet 11    ondansetron (Zofran) 4 mg tablet Take 1 tablet (4 mg) by mouth every 8 hours if needed for nausea or vomiting. 20 tablet 0    pantoprazole (ProtoNix) 40 mg EC tablet Take 1 tablet (40 mg) by mouth once daily. Do not crush, chew, or split. 30 tablet 1    Zafemy 150-35 mcg/24 hr Use continuously 12 patch 3    pimecrolimus (Elidel) 1 % cream Apply topically 2 times a day. (Patient not taking: Reported on 7/21/2025) 60 g 0     No current facility-administered medications for this visit.   [3]   Patient Active Problem List  Diagnosis    SLE (systemic lupus erythematosus) (Multi)    Atypical chest pain    Syncope and collapse    Palpitations    Dizziness    POTS (postural orthostatic tachycardia syndrome)    Hallucinations, visual    Auditory hallucinations    H/O anorexia nervosa    Chronic cough    Allergic rhinitis    Skin infection    Granulomatous lung disease (Multi)    Bronchitis    Dermatitis    Gastroesophageal reflux disease   [4]   Past Surgical History:  Procedure Laterality Date    WISDOM TOOTH EXTRACTION

## 2025-07-25 ENCOUNTER — HOSPITAL ENCOUNTER (OUTPATIENT)
Facility: HOSPITAL | Age: 25
Setting detail: OBSERVATION
Discharge: HOME | End: 2025-07-26
Attending: EMERGENCY MEDICINE | Admitting: NURSE PRACTITIONER
Payer: COMMERCIAL

## 2025-07-25 DIAGNOSIS — R11.0 INTRACTABLE NAUSEA: Primary | ICD-10-CM

## 2025-07-25 DIAGNOSIS — R11.14 BILIOUS VOMITING WITH NAUSEA: ICD-10-CM

## 2025-07-25 PROBLEM — R11.2 NAUSEA & VOMITING: Status: ACTIVE | Noted: 2025-07-25

## 2025-07-25 LAB
ALBUMIN SERPL BCP-MCNC: 4.4 G/DL (ref 3.4–5)
ALP SERPL-CCNC: 40 U/L (ref 33–110)
ALT SERPL W P-5'-P-CCNC: 9 U/L (ref 7–45)
ANION GAP SERPL CALC-SCNC: 13 MMOL/L (ref 10–20)
AST SERPL W P-5'-P-CCNC: 19 U/L (ref 9–39)
BASOPHILS # BLD AUTO: 0.07 X10*3/UL (ref 0–0.1)
BASOPHILS NFR BLD AUTO: 1 %
BILIRUB SERPL-MCNC: 0.3 MG/DL (ref 0–1.2)
BUN SERPL-MCNC: 7 MG/DL (ref 6–23)
CALCIUM SERPL-MCNC: 9.1 MG/DL (ref 8.6–10.3)
CHLORIDE SERPL-SCNC: 104 MMOL/L (ref 98–107)
CO2 SERPL-SCNC: 25 MMOL/L (ref 21–32)
CREAT SERPL-MCNC: 0.64 MG/DL (ref 0.5–1.05)
EGFRCR SERPLBLD CKD-EPI 2021: >90 ML/MIN/1.73M*2
EOSINOPHIL # BLD AUTO: 0.09 X10*3/UL (ref 0–0.7)
EOSINOPHIL NFR BLD AUTO: 1.3 %
ERYTHROCYTE [DISTWIDTH] IN BLOOD BY AUTOMATED COUNT: 11.5 % (ref 11.5–14.5)
GLUCOSE SERPL-MCNC: 94 MG/DL (ref 74–99)
HCT VFR BLD AUTO: 35.2 % (ref 36–46)
HGB BLD-MCNC: 12.1 G/DL (ref 12–16)
IMM GRANULOCYTES # BLD AUTO: 0.02 X10*3/UL (ref 0–0.7)
IMM GRANULOCYTES NFR BLD AUTO: 0.3 % (ref 0–0.9)
LIPASE SERPL-CCNC: 125 U/L (ref 9–82)
LYMPHOCYTES # BLD AUTO: 2.77 X10*3/UL (ref 1.2–4.8)
LYMPHOCYTES NFR BLD AUTO: 40.3 %
MCH RBC QN AUTO: 30.6 PG (ref 26–34)
MCHC RBC AUTO-ENTMCNC: 34.4 G/DL (ref 32–36)
MCV RBC AUTO: 89 FL (ref 80–100)
MONOCYTES # BLD AUTO: 0.58 X10*3/UL (ref 0.1–1)
MONOCYTES NFR BLD AUTO: 8.4 %
NEUTROPHILS # BLD AUTO: 3.35 X10*3/UL (ref 1.2–7.7)
NEUTROPHILS NFR BLD AUTO: 48.7 %
NRBC BLD-RTO: 0 /100 WBCS (ref 0–0)
PLATELET # BLD AUTO: 320 X10*3/UL (ref 150–450)
POTASSIUM SERPL-SCNC: 3.7 MMOL/L (ref 3.5–5.3)
PROT SERPL-MCNC: 7 G/DL (ref 6.4–8.2)
RBC # BLD AUTO: 3.96 X10*6/UL (ref 4–5.2)
SODIUM SERPL-SCNC: 138 MMOL/L (ref 136–145)
WBC # BLD AUTO: 6.9 X10*3/UL (ref 4.4–11.3)

## 2025-07-25 PROCEDURE — 2500000004 HC RX 250 GENERAL PHARMACY W/ HCPCS (ALT 636 FOR OP/ED): Performed by: EMERGENCY MEDICINE

## 2025-07-25 PROCEDURE — 2500000004 HC RX 250 GENERAL PHARMACY W/ HCPCS (ALT 636 FOR OP/ED): Performed by: NURSE PRACTITIONER

## 2025-07-25 PROCEDURE — 85025 COMPLETE CBC W/AUTO DIFF WBC: CPT | Performed by: EMERGENCY MEDICINE

## 2025-07-25 PROCEDURE — 83690 ASSAY OF LIPASE: CPT | Performed by: EMERGENCY MEDICINE

## 2025-07-25 PROCEDURE — G0378 HOSPITAL OBSERVATION PER HR: HCPCS

## 2025-07-25 PROCEDURE — 96375 TX/PRO/DX INJ NEW DRUG ADDON: CPT

## 2025-07-25 PROCEDURE — 96376 TX/PRO/DX INJ SAME DRUG ADON: CPT

## 2025-07-25 PROCEDURE — 96374 THER/PROPH/DIAG INJ IV PUSH: CPT

## 2025-07-25 PROCEDURE — 99222 1ST HOSP IP/OBS MODERATE 55: CPT | Performed by: NURSE PRACTITIONER

## 2025-07-25 PROCEDURE — 36415 COLL VENOUS BLD VENIPUNCTURE: CPT | Performed by: EMERGENCY MEDICINE

## 2025-07-25 PROCEDURE — 99285 EMERGENCY DEPT VISIT HI MDM: CPT | Mod: 25 | Performed by: EMERGENCY MEDICINE

## 2025-07-25 PROCEDURE — 84075 ASSAY ALKALINE PHOSPHATASE: CPT | Performed by: EMERGENCY MEDICINE

## 2025-07-25 PROCEDURE — 96361 HYDRATE IV INFUSION ADD-ON: CPT

## 2025-07-25 RX ORDER — POLYETHYLENE GLYCOL 3350 17 G/17G
17 POWDER, FOR SOLUTION ORAL DAILY PRN
Status: DISCONTINUED | OUTPATIENT
Start: 2025-07-25 | End: 2025-07-25

## 2025-07-25 RX ORDER — METOCLOPRAMIDE 10 MG/1
10 TABLET ORAL EVERY 6 HOURS PRN
Status: DISCONTINUED | OUTPATIENT
Start: 2025-07-25 | End: 2025-07-26 | Stop reason: HOSPADM

## 2025-07-25 RX ORDER — PROCHLORPERAZINE EDISYLATE 5 MG/ML
10 INJECTION INTRAMUSCULAR; INTRAVENOUS EVERY 6 HOURS PRN
Status: DISCONTINUED | OUTPATIENT
Start: 2025-07-25 | End: 2025-07-25

## 2025-07-25 RX ORDER — ONDANSETRON HYDROCHLORIDE 2 MG/ML
4 INJECTION, SOLUTION INTRAVENOUS ONCE
Status: COMPLETED | OUTPATIENT
Start: 2025-07-25 | End: 2025-07-25

## 2025-07-25 RX ORDER — PROCHLORPERAZINE 25 MG/1
25 SUPPOSITORY RECTAL EVERY 12 HOURS PRN
Status: DISCONTINUED | OUTPATIENT
Start: 2025-07-25 | End: 2025-07-25

## 2025-07-25 RX ORDER — METOCLOPRAMIDE HYDROCHLORIDE 5 MG/ML
10 INJECTION INTRAMUSCULAR; INTRAVENOUS EVERY 6 HOURS PRN
Status: DISCONTINUED | OUTPATIENT
Start: 2025-07-25 | End: 2025-07-26 | Stop reason: HOSPADM

## 2025-07-25 RX ORDER — FAMOTIDINE 10 MG/ML
20 INJECTION, SOLUTION INTRAVENOUS ONCE
Status: COMPLETED | OUTPATIENT
Start: 2025-07-25 | End: 2025-07-25

## 2025-07-25 RX ORDER — FAMOTIDINE 10 MG/ML
20 INJECTION, SOLUTION INTRAVENOUS
Status: DISCONTINUED | OUTPATIENT
Start: 2025-07-26 | End: 2025-07-26 | Stop reason: HOSPADM

## 2025-07-25 RX ORDER — ENOXAPARIN SODIUM 100 MG/ML
40 INJECTION SUBCUTANEOUS DAILY
Status: DISCONTINUED | OUTPATIENT
Start: 2025-07-26 | End: 2025-07-25

## 2025-07-25 RX ORDER — POLYETHYLENE GLYCOL 3350 17 G/17G
17 POWDER, FOR SOLUTION ORAL DAILY PRN
Status: DISCONTINUED | OUTPATIENT
Start: 2025-07-25 | End: 2025-07-26 | Stop reason: HOSPADM

## 2025-07-25 RX ORDER — PROCHLORPERAZINE MALEATE 10 MG
10 TABLET ORAL EVERY 6 HOURS PRN
Status: DISCONTINUED | OUTPATIENT
Start: 2025-07-25 | End: 2025-07-25

## 2025-07-25 RX ORDER — ONDANSETRON HYDROCHLORIDE 2 MG/ML
4 INJECTION, SOLUTION INTRAVENOUS EVERY 6 HOURS PRN
Status: DISCONTINUED | OUTPATIENT
Start: 2025-07-25 | End: 2025-07-26 | Stop reason: HOSPADM

## 2025-07-25 RX ADMIN — SODIUM CHLORIDE, SODIUM LACTATE, POTASSIUM CHLORIDE, AND CALCIUM CHLORIDE 1000 ML: .6; .31; .03; .02 INJECTION, SOLUTION INTRAVENOUS at 15:17

## 2025-07-25 RX ADMIN — ONDANSETRON 4 MG: 2 INJECTION, SOLUTION INTRAMUSCULAR; INTRAVENOUS at 15:14

## 2025-07-25 RX ADMIN — ONDANSETRON 4 MG: 2 INJECTION, SOLUTION INTRAMUSCULAR; INTRAVENOUS at 19:18

## 2025-07-25 RX ADMIN — METOCLOPRAMIDE 10 MG: 5 INJECTION, SOLUTION INTRAMUSCULAR; INTRAVENOUS at 22:32

## 2025-07-25 RX ADMIN — FAMOTIDINE 20 MG: 10 INJECTION, SOLUTION INTRAVENOUS at 15:14

## 2025-07-25 SDOH — SOCIAL STABILITY: SOCIAL INSECURITY: WITHIN THE LAST YEAR, HAVE YOU BEEN HUMILIATED OR EMOTIONALLY ABUSED IN OTHER WAYS BY YOUR PARTNER OR EX-PARTNER?: NO

## 2025-07-25 SDOH — ECONOMIC STABILITY: INCOME INSECURITY: IN THE PAST 12 MONTHS HAS THE ELECTRIC, GAS, OIL, OR WATER COMPANY THREATENED TO SHUT OFF SERVICES IN YOUR HOME?: NO

## 2025-07-25 SDOH — ECONOMIC STABILITY: FOOD INSECURITY: WITHIN THE PAST 12 MONTHS, YOU WORRIED THAT YOUR FOOD WOULD RUN OUT BEFORE YOU GOT THE MONEY TO BUY MORE.: OFTEN TRUE

## 2025-07-25 SDOH — ECONOMIC STABILITY: FOOD INSECURITY: WITHIN THE PAST 12 MONTHS, THE FOOD YOU BOUGHT JUST DIDN'T LAST AND YOU DIDN'T HAVE MONEY TO GET MORE.: OFTEN TRUE

## 2025-07-25 SDOH — SOCIAL STABILITY: SOCIAL INSECURITY: WITHIN THE LAST YEAR, HAVE YOU BEEN AFRAID OF YOUR PARTNER OR EX-PARTNER?: NO

## 2025-07-25 SDOH — SOCIAL STABILITY: SOCIAL INSECURITY: WERE YOU ABLE TO COMPLETE ALL THE BEHAVIORAL HEALTH SCREENINGS?: YES

## 2025-07-25 SDOH — SOCIAL STABILITY: SOCIAL INSECURITY: HAVE YOU HAD THOUGHTS OF HARMING ANYONE ELSE?: NO

## 2025-07-25 ASSESSMENT — ACTIVITIES OF DAILY LIVING (ADL)
HEARING - LEFT EAR: FUNCTIONAL
ASSISTIVE_DEVICE: EYEGLASSES
FEEDING YOURSELF: INDEPENDENT
DRESSING YOURSELF: INDEPENDENT
GROOMING: INDEPENDENT
JUDGMENT_ADEQUATE_SAFELY_COMPLETE_DAILY_ACTIVITIES: YES
ADEQUATE_TO_COMPLETE_ADL: YES
WALKS IN HOME: INDEPENDENT
BATHING: INDEPENDENT
HEARING - RIGHT EAR: FUNCTIONAL
LACK_OF_TRANSPORTATION: NO
PATIENT'S MEMORY ADEQUATE TO SAFELY COMPLETE DAILY ACTIVITIES?: YES
TOILETING: INDEPENDENT

## 2025-07-25 ASSESSMENT — LIFESTYLE VARIABLES
SKIP TO QUESTIONS 9-10: 1
AUDIT-C TOTAL SCORE: 1
HOW OFTEN DO YOU HAVE 6 OR MORE DRINKS ON ONE OCCASION: NEVER
HOW MANY STANDARD DRINKS CONTAINING ALCOHOL DO YOU HAVE ON A TYPICAL DAY: 1 OR 2
AUDIT-C TOTAL SCORE: 1
HOW OFTEN DO YOU HAVE A DRINK CONTAINING ALCOHOL: MONTHLY OR LESS

## 2025-07-25 ASSESSMENT — COGNITIVE AND FUNCTIONAL STATUS - GENERAL
MOBILITY SCORE: 24
PATIENT BASELINE BEDBOUND: NO
DAILY ACTIVITIY SCORE: 24

## 2025-07-25 ASSESSMENT — PAIN SCALES - GENERAL
PAINLEVEL_OUTOF10: 0 - NO PAIN

## 2025-07-25 ASSESSMENT — PATIENT HEALTH QUESTIONNAIRE - PHQ9
SUM OF ALL RESPONSES TO PHQ9 QUESTIONS 1 & 2: 0
1. LITTLE INTEREST OR PLEASURE IN DOING THINGS: NOT AT ALL
2. FEELING DOWN, DEPRESSED OR HOPELESS: NOT AT ALL

## 2025-07-25 ASSESSMENT — PAIN - FUNCTIONAL ASSESSMENT
PAIN_FUNCTIONAL_ASSESSMENT: 0-10
PAIN_FUNCTIONAL_ASSESSMENT: 0-10

## 2025-07-25 NOTE — ED TRIAGE NOTES
Pt arrives to room #1 after a rapid response called. Pt states had just finished lunch when became extremely nauseated. States hx of same, denies LOC, denies syncope, denies lightheadedness. Denies CP/SOB. States when lying flat the nausea goes away, sitting up makes pt have dizziness and nausea. Arrives a/o x4.

## 2025-07-25 NOTE — PROGRESS NOTES
Pharmacy Medication History     Source of Information: Per patient     Additional concerns with the patient's PTA list.   Found sucralfate in fill history but hasn't started yet. Also hasn't started reglan at this time.     Notified Provider via [facility] : Yes    The following updates were made to the Prior to Admission medication list:     Medications ADDED:   Magnesium   Medications CHANGED:  Zyrtec is prn   Flonase is prn   Medications REMOVED:   N/a  Medications NOT TAKING:   Elidel   Reglan ( hasn't started )     Allergy reviewed : Yes    Meds 2 Beds : No    Outpatient pharmacy confirmed and updated in chart : Yes    Pharmacy name: [facility] [place]     The list below reflectives the updated PTA list. Please review each medication in order reconciliation for additional clarification and justification.    Prior to Admission Medications   Prescriptions Last Dose   Zafemy 150-35 mcg/24 hr 7/23/2025   Sig: Use continuously   Patient taking differently: Place 1 patch on the skin 1 (one) time per week. Use continuously changes weds   cetirizine (ZyrTEC) 10 mg tablet    Sig: Take 1 tablet (10 mg) by mouth once daily.   Patient taking differently: Take 1 tablet (10 mg) by mouth once daily as needed for allergies.   fluticasone (Flonase) 50 mcg/actuation nasal spray    Sig: Administer 1 spray into each nostril 2 times a day. Shake gently. Before first use, prime pump. After use, clean tip and replace cap.   Patient taking differently: Administer 1 spray into each nostril 2 times a day as needed for allergies. Shake gently. Before first use, prime pump. After use, clean tip and replace cap.   hydroxychloroquine (Plaquenil) 200 mg tablet 7/25/2025   Sig: Take 1 tablet (200 mg) by mouth once daily.   magnesium glycinate 100 mg tablet 7/24/2025   Sig: Take 1 tablet by mouth once daily at bedtime.   metoclopramide (Reglan) 5 mg tablet    Sig: Take 1 tablet (5 mg) by mouth 4 times a day for 5 days.   metoprolol succinate  XL (Toprol-XL) 25 mg 24 hr tablet 7/25/2025   Sig: Take 1 tablet (25 mg) by mouth once daily. Do not crush or chew.   ondansetron (Zofran) 4 mg tablet    Sig: Take 1 tablet (4 mg) by mouth every 8 hours if needed for nausea or vomiting.   pantoprazole (ProtoNix) 40 mg EC tablet 7/25/2025   Sig: Take 1 tablet (40 mg) by mouth once daily. Do not crush, chew, or split.                Facility-Administered Medications: None       The list below reflectives the updated allergy list. Please review each documented allergy for additional clarification and justification.    Allergies   Allergen Reactions    Pineapple Anaphylaxis, Unknown and Swelling    Amoxicillin Hives     Patient does not want testing    Azithromycin Diarrhea     Pt reports severe diarrhea and yellowing of her skin with the last course of azithromycin that she took 1/10/2025          07/25/25 at 5:57 PM - Michelle Antoine

## 2025-07-25 NOTE — H&P
HPI: Essence Almodovar is a 25 y.o. female, with a PMH of POTS, Lupus,  GERD, Subclinical Hyperthyroidism, Hx of Epigastric pain, Hx bilious vomiting and nausea who presented to [facility] ED on 7/25/2025 for nausea and near syncope .    Pt was a rapid response from the floor. Denies LOC    Patient reports that she has been feeling nauseated and having upper abdominal discomfort for the past 2 weeks. She has a primary care physician at the [facility] and they ordered a gastric emptying study and a CT abdomen and pelvis on her that was completed this week.    ED Course:   VSS- BP soft  LABS Lipases 125  Recent imaging at [facility]: Gastric emptying reports: Normal gastric emptying rate for the solid meal.   IV fluids  PRN Antiemetics  Orthostatic BP  IV Pepcid        Patient History   PMH: She has a past medical history of ADHD (09/02/2020), ADHD (attention deficit hyperactivity disorder), Allergic, Eating disorder, Eczema, Headache, Irregular heart beat (09/02/2020), Scoliosis, and Visual impairment.  PSH: She has a past surgical history that includes [place] tooth extraction.  SH: She reports that she has never smoked. She has never used smokeless tobacco. She reports current alcohol use. She reports that she does not use drugs.  FH: family history includes No Known Problems in her father and mother.     RX Allergies[1]  Current Outpatient Medications   Medication Instructions    cetirizine (ZYRTEC) 10 mg, oral, Daily    fluticasone (Flonase) 50 mcg/actuation nasal spray 1 spray, Each Nostril, 2 times daily, Shake gently. Before first use, prime pump. After use, clean tip and replace cap.    hydroxychloroquine (Plaquenil) 200 mg tablet 1 tablet, Daily    metoclopramide (REGLAN) 5 mg, oral, 4 times daily    metoprolol succinate XL (TOPROL-XL) 25 mg, oral, Daily, Do not crush or chew.    ondansetron (ZOFRAN) 4 mg, oral, Every 8 hours PRN    pantoprazole (PROTONIX) 40 mg, oral, Daily, Do not crush, chew, or split.     pimecrolimus (Elidel) 1 % cream Topical, 2 times daily    Zafemy 150-35 mcg/24 hr Use continuously     Review of Systems   ROS: 10-point review of systems was performed and is otherwise negative except as noted in HPI.        Heart Rate:  [71]   Temperature:  [36.5 °C (97.7 °F)]   Respirations:  [18]   BP: (105-108)/(65-72)   Height:  [152.4 cm (5')]   Weight:  [49.9 kg (110 lb)]   Pulse Ox:  [98 %-99 %]      0-10 (Numeric) Pain Score: 0 - No pain   Vitals:    25 1406   Weight: 49.9 kg (110 lb)        Physical Exam:  Vitals and nursing notes reviewed.  GENERAL: Alert and awake, cooperative; in no acute distress  SKIN: Warm and dry, cap refill <2  HEENT: Normocephalic, PEERL, mucous membranes pink and moist, wears glasses  CARDIAC: Regular rate and rhythm, S1S2, no murmurs or abnormal heart sounds  CHEST: Normal respiratory effort, no abnormal breath sounds  ABDOMEN: soft, non-distended, tenderness with palpation on Left area  EXTREMITIES: No lower extremity edema, normal pulses all 4 extremities  NEURO: Alert and oriented, mental status at baseline, no focal deficits  PSYCH: Behavior and affect as expected     Medications  Scheduled Medications[2]Continuous Medications[3]PRN Medications[4]    Diagnostic Results   CBC- 2025:  2:59 PM  6.9 12.1 320    35.2      BMP- 2025:  2:59 PM  138 7 _ 94   3.7 0.64 25    Estimated Creatinine Clearance: 97.4 mL/min (by C-G formula based on SCr of 0.64 mg/dL).     CA: 9.1 PROTIEN: 7.0 ALT: 9 Total Bili: 0.3 M.28   PHOS: _ ALBUMIN: 4.4 AST: 19   Alk Phos: 40      COAGS- No results in last year.  _   _ _     CV Labs  Troponin I, High Sensitivity   Date/Time Value Ref Range Status   07/10/2025 11:25 AM <3 0 - 13 ng/L Final   07/10/2025 10:22 AM <3 0 - 13 ng/L Final   2024 04:22 PM <3 0 - 13 ng/L Final   2024 02:31 PM <3 0 - 13 ng/L Final   10/24/2024 12:29 PM <3 0 - 13 ng/L Final   10/24/2024 11:14 AM <3 0 - 13 ng/L Final     BNP   Date/Time Value Ref  Range Status   07/10/2025 10:22 AM 27 0 - 99 pg/mL Final   12/18/2024 04:22 PM 13 0 - 99 pg/mL Final       Results for orders placed or performed during the hospital encounter of 07/25/25 (from the past 24 hours)   CBC and Auto Differential   Result Value Ref Range    WBC 6.9 4.4 - 11.3 x10*3/uL    nRBC 0.0 0.0 - 0.0 /100 WBCs    RBC 3.96 (L) 4.00 - 5.20 x10*6/uL    Hemoglobin 12.1 12.0 - 16.0 g/dL    Hematocrit 35.2 (L) 36.0 - 46.0 %    MCV 89 80 - 100 fL    MCH 30.6 26.0 - 34.0 pg    MCHC 34.4 32.0 - 36.0 g/dL    RDW 11.5 11.5 - 14.5 %    Platelets 320 150 - 450 x10*3/uL    Neutrophils % 48.7 40.0 - 80.0 %    Immature Granulocytes %, Automated 0.3 0.0 - 0.9 %    Lymphocytes % 40.3 13.0 - 44.0 %    Monocytes % 8.4 2.0 - 10.0 %    Eosinophils % 1.3 0.0 - 6.0 %    Basophils % 1.0 0.0 - 2.0 %    Neutrophils Absolute 3.35 1.20 - 7.70 x10*3/uL    Immature Granulocytes Absolute, Automated 0.02 0.00 - 0.70 x10*3/uL    Lymphocytes Absolute 2.77 1.20 - 4.80 x10*3/uL    Monocytes Absolute 0.58 0.10 - 1.00 x10*3/uL    Eosinophils Absolute 0.09 0.00 - 0.70 x10*3/uL    Basophils Absolute 0.07 0.00 - 0.10 x10*3/uL   Comprehensive metabolic panel   Result Value Ref Range    Glucose 94 74 - 99 mg/dL    Sodium 138 136 - 145 mmol/L    Potassium 3.7 3.5 - 5.3 mmol/L    Chloride 104 98 - 107 mmol/L    Bicarbonate 25 21 - 32 mmol/L    Anion Gap 13 10 - 20 mmol/L    Urea Nitrogen 7 6 - 23 mg/dL    Creatinine 0.64 0.50 - 1.05 mg/dL    eGFR >90 >60 mL/min/1.73m*2    Calcium 9.1 8.6 - 10.3 mg/dL    Albumin 4.4 3.4 - 5.0 g/dL    Alkaline Phosphatase 40 33 - 110 U/L    Total Protein 7.0 6.4 - 8.2 g/dL    AST 19 9 - 39 U/L    Bilirubin, Total 0.3 0.0 - 1.2 mg/dL    ALT 9 7 - 45 U/L   Lipase   Result Value Ref Range    Lipase 125 (H) 9 - 82 U/L     No orders to display       Assessment/Plan     HPI: Essence Almodovar is a 25 y.o. female, with a PMH of POTS, Lupus,  GERD, Subclinical Hyperthyroidism, Hx of Epigastric pain, Hx bilious vomiting and  nausea who presented to [facility] ED on 7/25/2025 for nausea and near syncope .    On assessment pt c/o left side abdominal pain states increased with palpation, reports pain is constant pressure feeling, states feels achy and with palpation become more intense and then becomes nauseated.   Pt was a rapid response from the floor. Denies LOC    Patient reports that she has been feeling nauseated and having upper abdominal discomfort for the past 2 weeks. She has a primary care physician at the [facility] and they ordered a gastric emptying study and a CT abdomen and pelvis on her that was completed this week.    Abd pain- Mid epigastric  Nausea   - PRN antiemetics  - PPI  -PRN analgesics  - NPO- advance diet as tolerated  - Consider GI consult if symptoms worsen or no improved    POTS  Near Syncopal episode- suspect POTS related  - IV fluids  - orthostatic BP  - telemetry     GI/VTE PPX: PPI,  NA      Chart, medical history, and labs/testing reviewed in detail.   Case and plan of care discussed with attending provider. Per Attending no additional orders at this time    Disposition: Discharge home  once medically cleared and stable    SHANELL Bustos-CNP   Observation/Internal Med BRAULIO  [facility]  07/25/25  5:36 PM  Total time of 60 minutes spent on professional and overall care, with >50% of time dedicated to counseling/coordination of care.         [1]   Allergies  Allergen Reactions    Pineapple Anaphylaxis, Unknown and Swelling    Amoxicillin Hives     Patient does not want testing    Azithromycin Diarrhea     Pt reports severe diarrhea and yellowing of her skin with the last course of azithromycin that she took 1/10/2025   [2] [3] [4]

## 2025-07-25 NOTE — ED PROVIDER NOTES
HPI   Chief Complaint   Patient presents with    Nausea    Dizziness       25-year-old woman history of POTS and lupus here complaining of nausea and near syncope.  Patient reports that she has been feeling nauseated and having upper abdominal discomfort for the past 2 weeks.  She has a primary care physician at the [facility] and they ordered a gastric emptying study and a CT abdomen and pelvis on her that was completed this week.  The studies did not show any etiology of her abdominal discomfort.  Patient reports she had [employer] last night and this morning ate pancakes and hashbrowns.  She had a sudden wave of nausea and went to the bathroom and put herself on the ground.  Her coworkers came to help her.  She describes upper abdominal discomfort as sharp intermittent pain.  She has palpitations.  Patient reports that this is not typical of her POTS episodes.                Patient History   Medical History[1]  Surgical History[2]  Family History[3]  Social History[4]    Physical Exam   ED Triage Vitals [07/25/25 1406]   Temperature Heart Rate Respirations BP   36.5 °C (97.7 °F) 71 18 105/72      Pulse Ox Temp src Heart Rate Source Patient Position   99 % -- -- --      BP Location FiO2 (%)     -- --       Physical Exam  Vitals and nursing note reviewed.   Constitutional:       General: She is not in acute distress.     Appearance: Normal appearance. She is not toxic-appearing.   HENT:      Head: Normocephalic.      Mouth/Throat:      Mouth: Mucous membranes are moist.     Eyes:      Conjunctiva/sclera: Conjunctivae normal.      Pupils: Pupils are equal, round, and reactive to light.       Cardiovascular:      Rate and Rhythm: Normal rate and regular rhythm.      Pulses:           Radial pulses are 2+ on the right side and 2+ on the left side.   Pulmonary:      Effort: Pulmonary effort is normal. No respiratory distress.      Breath sounds: Normal breath sounds.   Abdominal:      General: Abdomen is flat.       Palpations: Abdomen is soft.      Tenderness: There is abdominal tenderness. There is no guarding or rebound.      Comments: Epigastric ttp     Musculoskeletal:      Cervical back: Normal range of motion and neck supple.      Right lower leg: No edema.      Left lower leg: No edema.     Skin:     General: Skin is warm.     Neurological:      Mental Status: She is alert and oriented to person, place, and time.     Psychiatric:         Mood and Affect: Mood normal.           ED Course & MDM   ED Course as of 07/25/25 1824 Fri Jul 25, 2025   1552 Lipase(!) [JG]   1602 Zofran helped with her nausea.  She is doing a p.o. trial now [JG]   1637 Accepted by [facility] to OBS.  She has not been able to tolerate oral fluids [JG]      ED Course User Index  [JG] Pepper Soliz MD         Diagnoses as of 07/25/25 1824   Intractable nausea                 No data recorded     Armani Coma Scale Score: 15 (07/25/25 1406 : Earle Colvin RN)                           Medical Decision Making  Patient had a normal CAT scan and gastric emptying study this week for the same symptoms.  Will obtain labs and give Zofran and Pepcid and reassess.    Amount and/or Complexity of Data Reviewed  ECG/medicine tests: independent interpretation performed.     Details: Normal sinus rhythm, heart rate 65, T wave inversion in V2 and V3 unchanged from prior EKGs        Procedure  Procedures       [1]   Past Medical History:  Diagnosis Date    ADHD 09/02/2020    ADHD (attention deficit hyperactivity disorder)     Allergic     Eating disorder     Eczema     Headache     Irregular heart beat 09/02/2020    Scoliosis     Visual impairment    [2]   Past Surgical History:  Procedure Laterality Date    WISDOM TOOTH EXTRACTION     [3]   Family History  Problem Relation Name Age of Onset    No Known Problems Mother      No Known Problems Father     [4]   Social History  Tobacco Use    Smoking status: Never    Smokeless tobacco: Never   Vaping Use     Vaping status: Never Used   Substance Use Topics    Alcohol use: Yes     Comment: Occasional / Every other Month.    Drug use: Never        Pepper Soliz MD  07/25/25 9460

## 2025-07-26 VITALS
WEIGHT: 110 LBS | RESPIRATION RATE: 18 BRPM | BODY MASS INDEX: 21.6 KG/M2 | DIASTOLIC BLOOD PRESSURE: 67 MMHG | OXYGEN SATURATION: 100 % | TEMPERATURE: 95.5 F | SYSTOLIC BLOOD PRESSURE: 112 MMHG | HEART RATE: 101 BPM | HEIGHT: 60 IN

## 2025-07-26 PROBLEM — J40 BRONCHITIS: Status: RESOLVED | Noted: 2025-03-31 | Resolved: 2025-07-26

## 2025-07-26 PROBLEM — R07.89 ATYPICAL CHEST PAIN: Status: RESOLVED | Noted: 2024-11-18 | Resolved: 2025-07-26

## 2025-07-26 PROBLEM — L30.9 DERMATITIS: Status: RESOLVED | Noted: 2025-06-03 | Resolved: 2025-07-26

## 2025-07-26 PROBLEM — L08.9 SKIN INFECTION: Status: RESOLVED | Noted: 2025-03-18 | Resolved: 2025-07-26

## 2025-07-26 PROBLEM — R55 SYNCOPE AND COLLAPSE: Status: RESOLVED | Noted: 2024-11-18 | Resolved: 2025-07-26

## 2025-07-26 PROBLEM — R11.14 BILIOUS VOMITING WITH NAUSEA: Status: ACTIVE | Noted: 2025-07-26

## 2025-07-26 PROBLEM — R00.2 PALPITATIONS: Status: RESOLVED | Noted: 2024-11-18 | Resolved: 2025-07-26

## 2025-07-26 PROBLEM — R42 DIZZINESS: Status: RESOLVED | Noted: 2024-12-18 | Resolved: 2025-07-26

## 2025-07-26 PROBLEM — R11.0 INTRACTABLE NAUSEA: Status: RESOLVED | Noted: 2025-07-25 | Resolved: 2025-07-26

## 2025-07-26 PROBLEM — R11.0 INTRACTABLE NAUSEA: Status: ACTIVE | Noted: 2025-07-25

## 2025-07-26 LAB
ANION GAP SERPL CALC-SCNC: 13 MMOL/L (ref 10–20)
BUN SERPL-MCNC: 6 MG/DL (ref 6–23)
CALCIUM SERPL-MCNC: 9 MG/DL (ref 8.6–10.3)
CHLORIDE SERPL-SCNC: 105 MMOL/L (ref 98–107)
CO2 SERPL-SCNC: 25 MMOL/L (ref 21–32)
CREAT SERPL-MCNC: 0.65 MG/DL (ref 0.5–1.05)
EGFRCR SERPLBLD CKD-EPI 2021: >90 ML/MIN/1.73M*2
ERYTHROCYTE [DISTWIDTH] IN BLOOD BY AUTOMATED COUNT: 11.3 % (ref 11.5–14.5)
GLUCOSE SERPL-MCNC: 67 MG/DL (ref 74–99)
HCT VFR BLD AUTO: 32.9 % (ref 36–46)
HGB BLD-MCNC: 11.6 G/DL (ref 12–16)
MCH RBC QN AUTO: 30.2 PG (ref 26–34)
MCHC RBC AUTO-ENTMCNC: 35.3 G/DL (ref 32–36)
MCV RBC AUTO: 86 FL (ref 80–100)
NRBC BLD-RTO: 0 /100 WBCS (ref 0–0)
PLATELET # BLD AUTO: 313 X10*3/UL (ref 150–450)
POTASSIUM SERPL-SCNC: 3.5 MMOL/L (ref 3.5–5.3)
RBC # BLD AUTO: 3.84 X10*6/UL (ref 4–5.2)
SODIUM SERPL-SCNC: 139 MMOL/L (ref 136–145)
WBC # BLD AUTO: 8 X10*3/UL (ref 4.4–11.3)

## 2025-07-26 PROCEDURE — 80048 BASIC METABOLIC PNL TOTAL CA: CPT | Performed by: NURSE PRACTITIONER

## 2025-07-26 PROCEDURE — 2500000001 HC RX 250 WO HCPCS SELF ADMINISTERED DRUGS (ALT 637 FOR MEDICARE OP): Performed by: NURSE PRACTITIONER

## 2025-07-26 PROCEDURE — 99239 HOSP IP/OBS DSCHRG MGMT >30: CPT | Performed by: NURSE PRACTITIONER

## 2025-07-26 PROCEDURE — G0378 HOSPITAL OBSERVATION PER HR: HCPCS

## 2025-07-26 PROCEDURE — 36415 COLL VENOUS BLD VENIPUNCTURE: CPT | Performed by: NURSE PRACTITIONER

## 2025-07-26 PROCEDURE — 2500000004 HC RX 250 GENERAL PHARMACY W/ HCPCS (ALT 636 FOR OP/ED): Performed by: NURSE PRACTITIONER

## 2025-07-26 PROCEDURE — 85027 COMPLETE CBC AUTOMATED: CPT | Performed by: NURSE PRACTITIONER

## 2025-07-26 PROCEDURE — 96376 TX/PRO/DX INJ SAME DRUG ADON: CPT

## 2025-07-26 RX ORDER — ACETAMINOPHEN 325 MG/1
975 TABLET ORAL EVERY 6 HOURS PRN
Status: DISCONTINUED | OUTPATIENT
Start: 2025-07-26 | End: 2025-07-26 | Stop reason: HOSPADM

## 2025-07-26 RX ORDER — ONDANSETRON 4 MG/1
4 TABLET, FILM COATED ORAL EVERY 8 HOURS PRN
Qty: 24 TABLET | Refills: 0 | Status: SHIPPED | OUTPATIENT
Start: 2025-07-26

## 2025-07-26 RX ADMIN — METOCLOPRAMIDE 10 MG: 5 INJECTION, SOLUTION INTRAMUSCULAR; INTRAVENOUS at 10:15

## 2025-07-26 RX ADMIN — ONDANSETRON 4 MG: 2 INJECTION, SOLUTION INTRAMUSCULAR; INTRAVENOUS at 15:58

## 2025-07-26 RX ADMIN — FAMOTIDINE 20 MG: 10 INJECTION, SOLUTION INTRAVENOUS at 15:57

## 2025-07-26 RX ADMIN — ACETAMINOPHEN 975 MG: 325 TABLET ORAL at 10:15

## 2025-07-26 RX ADMIN — ONDANSETRON 4 MG: 2 INJECTION, SOLUTION INTRAMUSCULAR; INTRAVENOUS at 08:18

## 2025-07-26 ASSESSMENT — PAIN - FUNCTIONAL ASSESSMENT: PAIN_FUNCTIONAL_ASSESSMENT: 0-10

## 2025-07-26 ASSESSMENT — PAIN DESCRIPTION - DESCRIPTORS: DESCRIPTORS: SHARP

## 2025-07-26 ASSESSMENT — COGNITIVE AND FUNCTIONAL STATUS - GENERAL
MOBILITY SCORE: 24
DAILY ACTIVITIY SCORE: 24

## 2025-07-26 ASSESSMENT — PAIN SCALES - GENERAL
PAINLEVEL_OUTOF10: 4
PAINLEVEL_OUTOF10: 6

## 2025-07-26 ASSESSMENT — ACTIVITIES OF DAILY LIVING (ADL): LACK_OF_TRANSPORTATION: NO

## 2025-07-26 ASSESSMENT — PAIN DESCRIPTION - LOCATION: LOCATION: HEAD

## 2025-07-26 NOTE — CARE PLAN
"The patient's goals for the shift include  \"no abdominal pain or nausea\"    The clinical goals for the shift include Pt will have decreased nausea    Over the shift, the patient did make progress toward the following goals.       Problem: Pain - Adult  Goal: Verbalizes/displays adequate comfort level or baseline comfort level  Outcome: Progressing     Problem: Chronic Conditions and Co-morbidities  Goal: Patient's chronic conditions and co-morbidity symptoms are monitored and maintained or improved  Outcome: Progressing       "

## 2025-07-26 NOTE — NURSING NOTE
Pt discharged to home in stable condition, belongings with pt, IV out, discharge instructions reviewed with pt, verbalized understanding, no c/o discomfort and no further needs. Pt left unit ambulatory.

## 2025-07-26 NOTE — DISCHARGE INSTRUCTIONS
Discharge Instructions  You came to the hospital for nausea/vomiting and were admitted for observation and further care.   You were treated with IV fluids and anti-nausea medications.   Your discharge plan is to go home to recover.    Please see your primary care doctor in 1 week for follow-up.   An appointment referral(s) has been placed for you, but may you need to call the provider's office or use [facility] to schedule.  For any issues or concerns with appointments, call the  scheduling line at [phone].       See the attached information for education about the condition(s) you were treated for.  Your medications may have changed so pay close attention to the list given to you at discharge   and ask any questions you have before leaving the hospital.

## 2025-07-26 NOTE — CARE PLAN
The patient's goals for the shift include    Problem: Pain - Adult  Goal: Verbalizes/displays adequate comfort level or baseline comfort level  Outcome: Progressing     Problem: Safety - Adult  Goal: Free from fall injury  Outcome: Progressing     Problem: Discharge Planning  Goal: Discharge to home or other facility with appropriate resources  Outcome: Progressing     Problem: Chronic Conditions and Co-morbidities  Goal: Patient's chronic conditions and co-morbidity symptoms are monitored and maintained or improved  Outcome: Progressing     Problem: Nutrition  Goal: Nutrient intake appropriate for maintaining nutritional needs  Outcome: Progressing       The clinical goals for the shift include Pt will remain safe and HDS this shift.

## 2025-07-26 NOTE — CARE PLAN
The patient's goals for the shift include    Problem: Pain - Adult  Goal: Verbalizes/displays adequate comfort level or baseline comfort level  7/26/2025 1513 by Elysia Hernández RN  Outcome: Met  7/26/2025 1049 by Elysia Hernández RN  Outcome: Progressing     Problem: Safety - Adult  Goal: Free from fall injury  7/26/2025 1513 by Elysia Hernández RN  Outcome: Met  7/26/2025 1049 by Elysia Hernández RN  Outcome: Progressing     Problem: Discharge Planning  Goal: Discharge to home or other facility with appropriate resources  7/26/2025 1513 by Elysia Hernández RN  Outcome: Met  7/26/2025 1049 by Elysia Hernández RN  Outcome: Progressing     Problem: Chronic Conditions and Co-morbidities  Goal: Patient's chronic conditions and co-morbidity symptoms are monitored and maintained or improved  7/26/2025 1513 by Elysia Hernández RN  Outcome: Met  7/26/2025 1049 by Elysia Hernández RN  Outcome: Progressing     Problem: Nutrition  Goal: Nutrient intake appropriate for maintaining nutritional needs  7/26/2025 1513 by Elysia Hernández RN  Outcome: Met  7/26/2025 1049 by Elysia Hernández RN  Outcome: Progressing       The clinical goals for the shift include Pt will remain safe and HDS this shift.

## 2025-07-26 NOTE — PROGRESS NOTES
07/26/25 0924   Discharge Planning   Living Arrangements Alone   Support Systems Friends/neighbors   Type of Residence Private residence   Expected Discharge Disposition Home   Financial Resource Strain   How hard is it for you to pay for the very basics like food, housing, medical care, and heating? Somewhat   Housing Stability   In the last 12 months, was there a time when you were not able to pay the mortgage or rent on time? N   In the past 12 months, how many times have you moved where you were living? 0   At any time in the past 12 months, were you homeless or living in a shelter (including now)? N   Transportation Needs   In the past 12 months, has lack of transportation kept you from medical appointments or from getting medications? no   In the past 12 months, has lack of transportation kept you from meetings, work, or from getting things needed for daily living? No     Pt here with [facility]. [facility] is 25. Anticipate HNN.

## 2025-07-26 NOTE — DISCHARGE SUMMARY
DISCHARGE SUMMARY    Admitting Provider: Clarisa Ring, SHANELL-CNP  Discharge Provider: Ana Maria Brooks MD  Primary Care Physician: Janelle Fry, SHANELL--[phone]    Admission Date: 7/25/2025       Discharge Date: 7/26/2025  Discharge Disposition: Home  Code Status at Discharge: Full Code    Present on Admission:   (Resolved) Intractable nausea   POTS (postural orthostatic tachycardia syndrome)   Gastroesophageal reflux disease   Bilious vomiting with nausea       Hospital Course   Essence Almodovar is a 25 y.o. year old female, with a PMH of POTS, lupus, GERD, and subclinical hyperthyroidism, who presented to [facility] ED on 7/25/2025 for intractable nausea, upper abd pain, and near syncope. She has been undergoing outpatient evaluation for her GI sx with recent normal GES and CT abd/pel this week. She had a sudden wave of nausea at work and lowered herself to the ground, no LOC. A rapid response was called. ED course was unremarkable. Patient was admitted under observation status on 7/25/2025 for symptom management. Nausea improved with antiemetics and IVF, no further syncope, tolerating PO. Patient was discharged home in stable condition with planned outpatient FU with PCP, continue GI evaluation as outpatient, refill provided for Zofran, has Reglan at home.    Objective    Discharge Vitals  Heart Rate: 101  Resp: 18  BP: 112/67  Temp: 35.3 °C (95.5 °F)  Weight: 49.9 kg (110 lb)    Physical Exam  Vitals and nursing notes reviewed.  GENERAL: Alert and awake, cooperative; in no acute distress  SKIN: Warm and dry, cap refill <2  HEENT: Normocephalic, PEERL, mucous membranes pink and moist  NECK: No JVD or hepatojugular reflex  CARDIAC: Regular rate and rhythm, S1S2, no murmurs or abnormal heart sounds  CHEST: Normal respiratory effort, no abnormal breath sounds  ABDOMEN: Soft, non-distended, non-tender with palpation  EXTREMITIES: No lower extremity edema, normal pulses all 4 extremities  NEURO:  Alert and oriented, mental status at baseline, no focal deficits  PSYCH: Behavior and affect as expected     Issues Requiring Follow-up: None  Test Results Pending at Discharge  Pending Labs       No current pending labs.            Discharge Medication List     Medication List      CHANGE how you take these medications     cetirizine 10 mg tablet; Commonly known as: ZyrTEC; Take 1 tablet (10   mg) by mouth once daily.; What changed: when to take this, reasons to take   this   fluticasone 50 mcg/actuation nasal spray; Commonly known as: Flonase;   Administer 1 spray into each nostril 2 times a day. Shake gently. Before   first use, prime pump. After use, clean tip and replace cap.; What   changed: when to take this, reasons to take this   Zafemy 150-35 mcg/24 hr; Generic drug: norelgestromin-ethin.estradioL;   Use continuously; What changed: how much to take, how to take this, when   to take this, additional instructions     CONTINUE taking these medications     hydroxychloroquine 200 mg tablet; Commonly known as: Plaquenil   magnesium glycinate 100 mg tablet   metoclopramide 5 mg tablet; Commonly known as: Reglan; Take 1 tablet (5   mg) by mouth 4 times a day for 5 days.   metoprolol succinate XL 25 mg 24 hr tablet; Commonly known as:   Toprol-XL; Take 1 tablet (25 mg) by mouth once daily. Do not crush or   chew.   ondansetron 4 mg tablet; Commonly known as: Zofran; Take 1 tablet (4 mg)   by mouth every 8 hours if needed for nausea or vomiting.   pantoprazole 40 mg EC tablet; Commonly known as: ProtoNix; Take 1 tablet   (40 mg) by mouth once daily. Do not crush, chew, or split.         Outpatient Follow-Up  Future Appointments   Date Time Provider Department Center   8/13/2025  8:00 AM Adriana Trammell MD [id number]   - Patient instructed to FU with PCP in 1 week, referral order(s) placed    Chart, medical history, and labs/testing reviewed in detail.   Plan of care discussed and discharge plan agreed upon  with staff attending, Dr. Brooks.     SHANELL Pichardo-CNP   Observation/Internal Med BRAULIO  [facility]  07/26/25 3:06 PM    On day of discharge, vital signs were stable and no acute distress was noted. All questions were answered and much support was given. Patient/family verbalized understanding. After VS and labs were reviewed, it was determined the patient was stable for discharge. Hospital day of discharge management- spent >30 minutes coordinating the discharge and counseling/educating patient and family regarding discharge instructions.

## 2025-07-27 ENCOUNTER — APPOINTMENT (OUTPATIENT)
Dept: CARDIOLOGY | Facility: HOSPITAL | Age: 25
End: 2025-07-27
Payer: COMMERCIAL

## 2025-07-27 ENCOUNTER — HOSPITAL ENCOUNTER (OUTPATIENT)
Facility: HOSPITAL | Age: 25
Setting detail: OBSERVATION
End: 2025-07-27
Admitting: NURSE PRACTITIONER
Payer: COMMERCIAL

## 2025-07-27 ENCOUNTER — APPOINTMENT (OUTPATIENT)
Dept: RADIOLOGY | Facility: HOSPITAL | Age: 25
End: 2025-07-27
Payer: COMMERCIAL

## 2025-07-27 VITALS
RESPIRATION RATE: 18 BRPM | SYSTOLIC BLOOD PRESSURE: 99 MMHG | OXYGEN SATURATION: 98 % | DIASTOLIC BLOOD PRESSURE: 64 MMHG | HEIGHT: 60 IN | WEIGHT: 110 LBS | BODY MASS INDEX: 21.6 KG/M2 | TEMPERATURE: 96.6 F | HEART RATE: 66 BPM

## 2025-07-27 DIAGNOSIS — N30.01 ACUTE CYSTITIS WITH HEMATURIA: ICD-10-CM

## 2025-07-27 DIAGNOSIS — R11.2 NAUSEA AND VOMITING, UNSPECIFIED VOMITING TYPE: ICD-10-CM

## 2025-07-27 DIAGNOSIS — R10.13 EPIGASTRIC PAIN: Primary | ICD-10-CM

## 2025-07-27 DIAGNOSIS — R74.8 ELEVATED LIPASE: ICD-10-CM

## 2025-07-27 LAB
ALBUMIN SERPL BCP-MCNC: 4.8 G/DL (ref 3.4–5)
ALP SERPL-CCNC: 44 U/L (ref 33–110)
ALT SERPL W P-5'-P-CCNC: 9 U/L (ref 7–45)
AMORPH CRY #/AREA UR COMP ASSIST: ABNORMAL /HPF
AMPHETAMINES UR QL SCN: ABNORMAL
ANION GAP SERPL CALC-SCNC: 20 MMOL/L (ref 10–20)
APPEARANCE UR: ABNORMAL
AST SERPL W P-5'-P-CCNC: 19 U/L (ref 9–39)
BACTERIA #/AREA URNS AUTO: ABNORMAL /HPF
BARBITURATES UR QL SCN: ABNORMAL
BASOPHILS # BLD AUTO: 0.06 X10*3/UL (ref 0–0.1)
BASOPHILS NFR BLD AUTO: 0.8 %
BENZODIAZ UR QL SCN: ABNORMAL
BILIRUB SERPL-MCNC: 0.6 MG/DL (ref 0–1.2)
BILIRUB UR STRIP.AUTO-MCNC: NEGATIVE MG/DL
BUN SERPL-MCNC: 11 MG/DL (ref 6–23)
BZE UR QL SCN: ABNORMAL
CALCIUM SERPL-MCNC: 9.2 MG/DL (ref 8.6–10.3)
CANNABINOIDS UR QL SCN: ABNORMAL
CARDIAC TROPONIN I PNL SERPL HS: <3 NG/L (ref 0–13)
CARDIAC TROPONIN I PNL SERPL HS: <3 NG/L (ref 0–13)
CHLORIDE SERPL-SCNC: 101 MMOL/L (ref 98–107)
CO2 SERPL-SCNC: 18 MMOL/L (ref 21–32)
COLOR UR: YELLOW
CREAT SERPL-MCNC: 0.71 MG/DL (ref 0.5–1.05)
EGFRCR SERPLBLD CKD-EPI 2021: >90 ML/MIN/1.73M*2
EOSINOPHIL # BLD AUTO: 0.01 X10*3/UL (ref 0–0.7)
EOSINOPHIL NFR BLD AUTO: 0.1 %
ERYTHROCYTE [DISTWIDTH] IN BLOOD BY AUTOMATED COUNT: 11.6 % (ref 11.5–14.5)
FENTANYL+NORFENTANYL UR QL SCN: ABNORMAL
GLUCOSE SERPL-MCNC: 63 MG/DL (ref 74–99)
GLUCOSE UR STRIP.AUTO-MCNC: NORMAL MG/DL
HCG UR QL IA.RAPID: NEGATIVE
HCT VFR BLD AUTO: 36.2 % (ref 36–46)
HGB BLD-MCNC: 12.4 G/DL (ref 12–16)
IMM GRANULOCYTES # BLD AUTO: 0.02 X10*3/UL (ref 0–0.7)
IMM GRANULOCYTES NFR BLD AUTO: 0.3 % (ref 0–0.9)
KETONES UR STRIP.AUTO-MCNC: ABNORMAL MG/DL
LACTATE SERPL-SCNC: 1.1 MMOL/L (ref 0.4–2)
LEUKOCYTE ESTERASE UR QL STRIP.AUTO: ABNORMAL
LIPASE SERPL-CCNC: 106 U/L (ref 9–82)
LYMPHOCYTES # BLD AUTO: 1.89 X10*3/UL (ref 1.2–4.8)
LYMPHOCYTES NFR BLD AUTO: 23.7 %
MAGNESIUM SERPL-MCNC: 2.15 MG/DL (ref 1.6–2.4)
MCH RBC QN AUTO: 30.7 PG (ref 26–34)
MCHC RBC AUTO-ENTMCNC: 34.3 G/DL (ref 32–36)
MCV RBC AUTO: 90 FL (ref 80–100)
METHADONE UR QL SCN: ABNORMAL
MONOCYTES # BLD AUTO: 0.46 X10*3/UL (ref 0.1–1)
MONOCYTES NFR BLD AUTO: 5.8 %
MUCOUS THREADS #/AREA URNS AUTO: ABNORMAL /LPF
NEUTROPHILS # BLD AUTO: 5.54 X10*3/UL (ref 1.2–7.7)
NEUTROPHILS NFR BLD AUTO: 69.3 %
NITRITE UR QL STRIP.AUTO: NEGATIVE
NRBC BLD-RTO: 0 /100 WBCS (ref 0–0)
OPIATES UR QL SCN: ABNORMAL
OXYCODONE+OXYMORPHONE UR QL SCN: ABNORMAL
PCP UR QL SCN: ABNORMAL
PH UR STRIP.AUTO: 5.5 [PH]
PHOSPHATE SERPL-MCNC: 3.9 MG/DL (ref 2.5–4.9)
PLATELET # BLD AUTO: 318 X10*3/UL (ref 150–450)
POTASSIUM SERPL-SCNC: 3.3 MMOL/L (ref 3.5–5.3)
PROT SERPL-MCNC: 7.9 G/DL (ref 6.4–8.2)
PROT UR STRIP.AUTO-MCNC: ABNORMAL MG/DL
RBC # BLD AUTO: 4.04 X10*6/UL (ref 4–5.2)
RBC # UR STRIP.AUTO: ABNORMAL MG/DL
RBC #/AREA URNS AUTO: ABNORMAL /HPF
SODIUM SERPL-SCNC: 136 MMOL/L (ref 136–145)
SP GR UR STRIP.AUTO: 1.03
SQUAMOUS #/AREA URNS AUTO: ABNORMAL /HPF
TSH SERPL-ACNC: 1.22 MIU/L (ref 0.44–3.98)
UROBILINOGEN UR STRIP.AUTO-MCNC: NORMAL MG/DL
WBC # BLD AUTO: 8 X10*3/UL (ref 4.4–11.3)
WBC #/AREA URNS AUTO: ABNORMAL /HPF

## 2025-07-27 PROCEDURE — G0378 HOSPITAL OBSERVATION PER HR: HCPCS

## 2025-07-27 PROCEDURE — 96366 THER/PROPH/DIAG IV INF ADDON: CPT

## 2025-07-27 PROCEDURE — 2500000004 HC RX 250 GENERAL PHARMACY W/ HCPCS (ALT 636 FOR OP/ED): Performed by: NURSE PRACTITIONER

## 2025-07-27 PROCEDURE — 96361 HYDRATE IV INFUSION ADD-ON: CPT

## 2025-07-27 PROCEDURE — 2500000004 HC RX 250 GENERAL PHARMACY W/ HCPCS (ALT 636 FOR OP/ED)

## 2025-07-27 PROCEDURE — 93005 ELECTROCARDIOGRAM TRACING: CPT

## 2025-07-27 PROCEDURE — 80307 DRUG TEST PRSMV CHEM ANLYZR: CPT | Performed by: NURSE PRACTITIONER

## 2025-07-27 PROCEDURE — 74177 CT ABD & PELVIS W/CONTRAST: CPT | Performed by: RADIOLOGY

## 2025-07-27 PROCEDURE — 2550000001 HC RX 255 CONTRASTS

## 2025-07-27 PROCEDURE — 84484 ASSAY OF TROPONIN QUANT: CPT

## 2025-07-27 PROCEDURE — 81001 URINALYSIS AUTO W/SCOPE: CPT

## 2025-07-27 PROCEDURE — 96375 TX/PRO/DX INJ NEW DRUG ADDON: CPT

## 2025-07-27 PROCEDURE — 36415 COLL VENOUS BLD VENIPUNCTURE: CPT

## 2025-07-27 PROCEDURE — 99222 1ST HOSP IP/OBS MODERATE 55: CPT | Performed by: NURSE PRACTITIONER

## 2025-07-27 PROCEDURE — 76705 ECHO EXAM OF ABDOMEN: CPT

## 2025-07-27 PROCEDURE — 87086 URINE CULTURE/COLONY COUNT: CPT | Mod: AHULAB

## 2025-07-27 PROCEDURE — 80053 COMPREHEN METABOLIC PANEL: CPT

## 2025-07-27 PROCEDURE — 84443 ASSAY THYROID STIM HORMONE: CPT | Performed by: NURSE PRACTITIONER

## 2025-07-27 PROCEDURE — 84100 ASSAY OF PHOSPHORUS: CPT

## 2025-07-27 PROCEDURE — 2500000002 HC RX 250 W HCPCS SELF ADMINISTERED DRUGS (ALT 637 FOR MEDICARE OP, ALT 636 FOR OP/ED)

## 2025-07-27 PROCEDURE — 2500000005 HC RX 250 GENERAL PHARMACY W/O HCPCS

## 2025-07-27 PROCEDURE — 74177 CT ABD & PELVIS W/CONTRAST: CPT

## 2025-07-27 PROCEDURE — 2500000001 HC RX 250 WO HCPCS SELF ADMINISTERED DRUGS (ALT 637 FOR MEDICARE OP)

## 2025-07-27 PROCEDURE — 83735 ASSAY OF MAGNESIUM: CPT

## 2025-07-27 PROCEDURE — 99285 EMERGENCY DEPT VISIT HI MDM: CPT | Mod: 25

## 2025-07-27 PROCEDURE — 96365 THER/PROPH/DIAG IV INF INIT: CPT

## 2025-07-27 PROCEDURE — 76705 ECHO EXAM OF ABDOMEN: CPT | Performed by: RADIOLOGY

## 2025-07-27 PROCEDURE — 85025 COMPLETE CBC W/AUTO DIFF WBC: CPT

## 2025-07-27 PROCEDURE — 83690 ASSAY OF LIPASE: CPT

## 2025-07-27 PROCEDURE — 81025 URINE PREGNANCY TEST: CPT

## 2025-07-27 PROCEDURE — 83605 ASSAY OF LACTIC ACID: CPT

## 2025-07-27 RX ORDER — ALUMINUM HYDROXIDE, MAGNESIUM HYDROXIDE, AND SIMETHICONE 1200; 120; 1200 MG/30ML; MG/30ML; MG/30ML
30 SUSPENSION ORAL ONCE
Status: COMPLETED | OUTPATIENT
Start: 2025-07-27 | End: 2025-07-27

## 2025-07-27 RX ORDER — ONDANSETRON HYDROCHLORIDE 2 MG/ML
4 INJECTION, SOLUTION INTRAVENOUS EVERY 6 HOURS
Status: DISCONTINUED | OUTPATIENT
Start: 2025-07-27 | End: 2025-07-28

## 2025-07-27 RX ORDER — METOCLOPRAMIDE HYDROCHLORIDE 5 MG/ML
10 INJECTION INTRAMUSCULAR; INTRAVENOUS ONCE
Status: COMPLETED | OUTPATIENT
Start: 2025-07-27 | End: 2025-07-27

## 2025-07-27 RX ORDER — DROPERIDOL 2.5 MG/ML
0.62 INJECTION, SOLUTION INTRAMUSCULAR; INTRAVENOUS ONCE
Status: COMPLETED | OUTPATIENT
Start: 2025-07-27 | End: 2025-07-27

## 2025-07-27 RX ORDER — PANTOPRAZOLE SODIUM 40 MG/10ML
40 INJECTION, POWDER, LYOPHILIZED, FOR SOLUTION INTRAVENOUS ONCE
Status: COMPLETED | OUTPATIENT
Start: 2025-07-27 | End: 2025-07-27

## 2025-07-27 RX ORDER — DIPHENHYDRAMINE HYDROCHLORIDE 50 MG/ML
25 INJECTION, SOLUTION INTRAMUSCULAR; INTRAVENOUS ONCE
Status: COMPLETED | OUTPATIENT
Start: 2025-07-27 | End: 2025-07-27

## 2025-07-27 RX ORDER — TALC
6 POWDER (GRAM) TOPICAL NIGHTLY PRN
Status: DISCONTINUED | OUTPATIENT
Start: 2025-07-27 | End: 2025-07-31 | Stop reason: HOSPADM

## 2025-07-27 RX ORDER — FAMOTIDINE 10 MG/ML
20 INJECTION, SOLUTION INTRAVENOUS EVERY 12 HOURS SCHEDULED
Status: DISCONTINUED | OUTPATIENT
Start: 2025-07-27 | End: 2025-07-31 | Stop reason: HOSPADM

## 2025-07-27 RX ORDER — AMOXICILLIN 250 MG
2 CAPSULE ORAL 2 TIMES DAILY PRN
Status: DISCONTINUED | OUTPATIENT
Start: 2025-07-27 | End: 2025-07-31 | Stop reason: HOSPADM

## 2025-07-27 RX ORDER — MORPHINE SULFATE 2 MG/ML
2 INJECTION, SOLUTION INTRAMUSCULAR; INTRAVENOUS ONCE
Status: COMPLETED | OUTPATIENT
Start: 2025-07-27 | End: 2025-07-27

## 2025-07-27 RX ORDER — POTASSIUM CHLORIDE 14.9 MG/ML
20 INJECTION INTRAVENOUS
Status: COMPLETED | OUTPATIENT
Start: 2025-07-27 | End: 2025-07-28

## 2025-07-27 RX ORDER — SULFAMETHOXAZOLE AND TRIMETHOPRIM 800; 160 MG/1; MG/1
1 TABLET ORAL ONCE
Status: COMPLETED | OUTPATIENT
Start: 2025-07-27 | End: 2025-07-27

## 2025-07-27 RX ORDER — LIDOCAINE HYDROCHLORIDE 20 MG/ML
15 SOLUTION OROPHARYNGEAL ONCE
Status: COMPLETED | OUTPATIENT
Start: 2025-07-27 | End: 2025-07-27

## 2025-07-27 RX ORDER — DEXTROSE MONOHYDRATE AND SODIUM CHLORIDE 5; .9 G/100ML; G/100ML
100 INJECTION, SOLUTION INTRAVENOUS CONTINUOUS
Status: ACTIVE | OUTPATIENT
Start: 2025-07-27 | End: 2025-07-28

## 2025-07-27 RX ORDER — CEFTRIAXONE 1 G/50ML
1 INJECTION, SOLUTION INTRAVENOUS EVERY 24 HOURS
Status: DISCONTINUED | OUTPATIENT
Start: 2025-07-27 | End: 2025-07-29

## 2025-07-27 RX ORDER — PANTOPRAZOLE SODIUM 40 MG/10ML
40 INJECTION, POWDER, LYOPHILIZED, FOR SOLUTION INTRAVENOUS 2 TIMES DAILY
Status: DISCONTINUED | OUTPATIENT
Start: 2025-07-28 | End: 2025-07-31 | Stop reason: HOSPADM

## 2025-07-27 RX ORDER — METOCLOPRAMIDE HYDROCHLORIDE 5 MG/ML
10 INJECTION INTRAMUSCULAR; INTRAVENOUS EVERY 6 HOURS SCHEDULED
Status: DISCONTINUED | OUTPATIENT
Start: 2025-07-28 | End: 2025-07-28

## 2025-07-27 RX ORDER — ACETAMINOPHEN 325 MG/1
650 TABLET ORAL EVERY 6 HOURS PRN
Status: DISCONTINUED | OUTPATIENT
Start: 2025-07-27 | End: 2025-07-31 | Stop reason: HOSPADM

## 2025-07-27 RX ADMIN — LIDOCAINE HYDROCHLORIDE 15 ML: 20 SOLUTION ORAL at 16:52

## 2025-07-27 RX ADMIN — FAMOTIDINE 20 MG: 10 INJECTION, SOLUTION INTRAVENOUS at 22:02

## 2025-07-27 RX ADMIN — IOHEXOL 75 ML: 350 INJECTION, SOLUTION INTRAVENOUS at 14:57

## 2025-07-27 RX ADMIN — MORPHINE SULFATE 2 MG: 2 INJECTION, SOLUTION INTRAMUSCULAR; INTRAVENOUS at 13:10

## 2025-07-27 RX ADMIN — DEXAMETHASONE SODIUM PHOSPHATE 4 MG: 4 INJECTION, SOLUTION INTRAMUSCULAR; INTRAVENOUS at 22:09

## 2025-07-27 RX ADMIN — POTASSIUM CHLORIDE 20 MEQ: 14.9 INJECTION, SOLUTION INTRAVENOUS at 19:36

## 2025-07-27 RX ADMIN — DROPERIDOL 0.62 MG: 2.5 INJECTION, SOLUTION INTRAMUSCULAR; INTRAVENOUS at 18:25

## 2025-07-27 RX ADMIN — CEFTRIAXONE 1 G: 1 INJECTION, SOLUTION INTRAVENOUS at 21:58

## 2025-07-27 RX ADMIN — SULFAMETHOXAZOLE AND TRIMETHOPRIM 1 TABLET: 800; 160 TABLET ORAL at 18:08

## 2025-07-27 RX ADMIN — ALUMINUM HYDROXIDE, MAGNESIUM HYDROXIDE, AND DIMETHICONE 30 ML: 200; 20; 200 SUSPENSION ORAL at 16:52

## 2025-07-27 RX ADMIN — DIPHENHYDRAMINE HYDROCHLORIDE 25 MG: 50 INJECTION, SOLUTION INTRAMUSCULAR; INTRAVENOUS at 20:17

## 2025-07-27 RX ADMIN — POTASSIUM CHLORIDE 20 MEQ: 14.9 INJECTION, SOLUTION INTRAVENOUS at 23:01

## 2025-07-27 RX ADMIN — METOCLOPRAMIDE 10 MG: 5 INJECTION, SOLUTION INTRAMUSCULAR; INTRAVENOUS at 13:10

## 2025-07-27 RX ADMIN — SODIUM CHLORIDE, POTASSIUM CHLORIDE, SODIUM LACTATE AND CALCIUM CHLORIDE 1000 ML: 600; 310; 30; 20 INJECTION, SOLUTION INTRAVENOUS at 13:09

## 2025-07-27 RX ADMIN — PANTOPRAZOLE SODIUM 40 MG: 40 INJECTION, POWDER, FOR SOLUTION INTRAVENOUS at 16:52

## 2025-07-27 RX ADMIN — ONDANSETRON 4 MG: 2 INJECTION, SOLUTION INTRAMUSCULAR; INTRAVENOUS at 22:06

## 2025-07-27 RX ADMIN — DEXTROSE AND SODIUM CHLORIDE 100 ML/HR: 5; .9 INJECTION, SOLUTION INTRAVENOUS at 19:37

## 2025-07-27 SDOH — SOCIAL STABILITY: SOCIAL INSECURITY: DOES ANYONE TRY TO KEEP YOU FROM HAVING/CONTACTING OTHER FRIENDS OR DOING THINGS OUTSIDE YOUR HOME?: NO

## 2025-07-27 SDOH — SOCIAL STABILITY: SOCIAL INSECURITY: WITHIN THE LAST YEAR, HAVE YOU BEEN AFRAID OF YOUR PARTNER OR EX-PARTNER?: NO

## 2025-07-27 SDOH — SOCIAL STABILITY: SOCIAL INSECURITY: HAVE YOU HAD THOUGHTS OF HARMING ANYONE ELSE?: NO

## 2025-07-27 SDOH — SOCIAL STABILITY: SOCIAL INSECURITY: DO YOU FEEL UNSAFE GOING BACK TO THE PLACE WHERE YOU ARE LIVING?: NO

## 2025-07-27 SDOH — ECONOMIC STABILITY: FOOD INSECURITY: HOW HARD IS IT FOR YOU TO PAY FOR THE VERY BASICS LIKE FOOD, HOUSING, MEDICAL CARE, AND HEATING?: NOT HARD AT ALL

## 2025-07-27 SDOH — ECONOMIC STABILITY: FOOD INSECURITY: WITHIN THE PAST 12 MONTHS, YOU WORRIED THAT YOUR FOOD WOULD RUN OUT BEFORE YOU GOT THE MONEY TO BUY MORE.: NEVER TRUE

## 2025-07-27 SDOH — ECONOMIC STABILITY: TRANSPORTATION INSECURITY: IN THE PAST 12 MONTHS, HAS LACK OF TRANSPORTATION KEPT YOU FROM MEDICAL APPOINTMENTS OR FROM GETTING MEDICATIONS?: NO

## 2025-07-27 SDOH — ECONOMIC STABILITY: HOUSING INSECURITY: IN THE LAST 12 MONTHS, WAS THERE A TIME WHEN YOU WERE NOT ABLE TO PAY THE MORTGAGE OR RENT ON TIME?: NO

## 2025-07-27 SDOH — SOCIAL STABILITY: SOCIAL INSECURITY: WITHIN THE LAST YEAR, HAVE YOU BEEN HUMILIATED OR EMOTIONALLY ABUSED IN OTHER WAYS BY YOUR PARTNER OR EX-PARTNER?: NO

## 2025-07-27 SDOH — ECONOMIC STABILITY: FOOD INSECURITY: WITHIN THE PAST 12 MONTHS, THE FOOD YOU BOUGHT JUST DIDN'T LAST AND YOU DIDN'T HAVE MONEY TO GET MORE.: NEVER TRUE

## 2025-07-27 SDOH — SOCIAL STABILITY: SOCIAL INSECURITY: HAS ANYONE EVER THREATENED TO HURT YOUR FAMILY OR YOUR PETS?: NO

## 2025-07-27 SDOH — ECONOMIC STABILITY: INCOME INSECURITY: IN THE PAST 12 MONTHS HAS THE ELECTRIC, GAS, OIL, OR WATER COMPANY THREATENED TO SHUT OFF SERVICES IN YOUR HOME?: NO

## 2025-07-27 SDOH — SOCIAL STABILITY: SOCIAL INSECURITY: ARE YOU OR HAVE YOU BEEN THREATENED OR ABUSED PHYSICALLY, EMOTIONALLY, OR SEXUALLY BY ANYONE?: NO

## 2025-07-27 SDOH — ECONOMIC STABILITY: HOUSING INSECURITY: IN THE PAST 12 MONTHS, HOW MANY TIMES HAVE YOU MOVED WHERE YOU WERE LIVING?: 0

## 2025-07-27 SDOH — ECONOMIC STABILITY: HOUSING INSECURITY: AT ANY TIME IN THE PAST 12 MONTHS, WERE YOU HOMELESS OR LIVING IN A SHELTER (INCLUDING NOW)?: NO

## 2025-07-27 SDOH — SOCIAL STABILITY: SOCIAL INSECURITY: WERE YOU ABLE TO COMPLETE ALL THE BEHAVIORAL HEALTH SCREENINGS?: YES

## 2025-07-27 SDOH — SOCIAL STABILITY: SOCIAL INSECURITY: DO YOU FEEL ANYONE HAS EXPLOITED OR TAKEN ADVANTAGE OF YOU FINANCIALLY OR OF YOUR PERSONAL PROPERTY?: NO

## 2025-07-27 SDOH — SOCIAL STABILITY: SOCIAL INSECURITY: ABUSE: ADULT

## 2025-07-27 SDOH — SOCIAL STABILITY: SOCIAL INSECURITY: HAVE YOU HAD ANY THOUGHTS OF HARMING ANYONE ELSE?: NO

## 2025-07-27 SDOH — SOCIAL STABILITY: SOCIAL INSECURITY: ARE THERE ANY APPARENT SIGNS OF INJURIES/BEHAVIORS THAT COULD BE RELATED TO ABUSE/NEGLECT?: NO

## 2025-07-27 ASSESSMENT — PAIN - FUNCTIONAL ASSESSMENT
PAIN_FUNCTIONAL_ASSESSMENT: 0-10

## 2025-07-27 ASSESSMENT — LIFESTYLE VARIABLES
HOW OFTEN DO YOU HAVE A DRINK CONTAINING ALCOHOL: NEVER
HOW MANY STANDARD DRINKS CONTAINING ALCOHOL DO YOU HAVE ON A TYPICAL DAY: PATIENT DOES NOT DRINK
SKIP TO QUESTIONS 9-10: 1
AUDIT-C TOTAL SCORE: 0
HOW OFTEN DO YOU HAVE 6 OR MORE DRINKS ON ONE OCCASION: NEVER
AUDIT-C TOTAL SCORE: 0

## 2025-07-27 ASSESSMENT — COGNITIVE AND FUNCTIONAL STATUS - GENERAL
MOBILITY SCORE: 24
PATIENT BASELINE BEDBOUND: NO
DAILY ACTIVITIY SCORE: 24

## 2025-07-27 ASSESSMENT — ACTIVITIES OF DAILY LIVING (ADL)
PATIENT'S MEMORY ADEQUATE TO SAFELY COMPLETE DAILY ACTIVITIES?: YES
ASSISTIVE_DEVICE: EYEGLASSES
BATHING: INDEPENDENT
HEARING - LEFT EAR: FUNCTIONAL
LACK_OF_TRANSPORTATION: NO
GROOMING: INDEPENDENT
JUDGMENT_ADEQUATE_SAFELY_COMPLETE_DAILY_ACTIVITIES: YES
TOILETING: INDEPENDENT
WALKS IN HOME: INDEPENDENT
HEARING - RIGHT EAR: FUNCTIONAL
ADEQUATE_TO_COMPLETE_ADL: YES
LACK_OF_TRANSPORTATION: NO
DRESSING YOURSELF: INDEPENDENT
FEEDING YOURSELF: INDEPENDENT

## 2025-07-27 ASSESSMENT — PAIN SCALES - GENERAL
PAINLEVEL_OUTOF10: 6
PAINLEVEL_OUTOF10: 4
PAINLEVEL_OUTOF10: 0 - NO PAIN
PAINLEVEL_OUTOF10: 4

## 2025-07-27 ASSESSMENT — PAIN DESCRIPTION - LOCATION: LOCATION: ABDOMEN

## 2025-07-27 ASSESSMENT — PAIN DESCRIPTION - DESCRIPTORS: DESCRIPTORS: SHARP;ACHING

## 2025-07-27 ASSESSMENT — PATIENT HEALTH QUESTIONNAIRE - PHQ9
1. LITTLE INTEREST OR PLEASURE IN DOING THINGS: NOT AT ALL
SUM OF ALL RESPONSES TO PHQ9 QUESTIONS 1 & 2: 0
2. FEELING DOWN, DEPRESSED OR HOPELESS: NOT AT ALL

## 2025-07-27 ASSESSMENT — PAIN DESCRIPTION - PAIN TYPE: TYPE: ACUTE PAIN

## 2025-07-27 NOTE — H&P
"History Of The Present Illness   Essence Almodovar is a 25 y.o. female  with a PMH of POTS, Lupus,  GERD, Subclinical Hyperthyroidism, Epigastric pain, bilious vomiting of unknown origin who presented to the ED due to nausea and epigastric pain x the past 2 weeks. She has been vomiting greeen blious emesis with these symptoms for the past 2 days and has felt a little dizzy off and on. She had a prior ED visit and Observation stay on 7/25-7/26 for similar reasons and syncope. She reports undergoing a gastric emptying study about a week ago that was normal. She has tried zofran and reglan at home and they do take the edge off for a few hrs at a time. Noted to have tried compazine recently and it made her \"\"crazy and tired.\" She hasn't taken anything solid by mouth since yesterday. Can tolerate a little water because it makes her feel really nauseous.  She She denies substance or alcohol use.     Denies HA, vision changes, fever, palpitations, sore throat, cough, wheezing, SOB, MULTANI, orthopnea, LE edema,  urinary complaints, diarrhea, bloody, maroon, or black stools/vomit, numbness/tingling or focal weakness in extremities, seizure, or syncope.      ED course/Review:  CTAP:  possible gastritis, a 1cm hepatic lesion likely hemagioma, and dense colonic colonic stool  RUQ US that was nml  Chem:  hypoglycemia w glucose 63, hypokalemia of K 3.3, low bicarb at 18. Mag WNL  CBC is unremarkable.   Lactate 1.1  Lipase 106 today, noted to be 125 a couple days ago  Tops negative x 2  EKG: NSR  Hcg negative  UA has leuks that may suggest UTI    She has received mylanta, droperidol, reglan, protonix, morphine, xylocaine solution, LR bolus of 1L and bactrim DS x1.   Still feeling quite nauseous.     VSS and WNL in general, but has been mildly tachycardic in the low 100's once    Past Medical History   Medical History[1]     Surgical History   Surgical History[2]    Family History   Family History[3]    Home Medications   See med " rec    Allergies   RX Allergies[4]     Social History   She denies alcohol, drug use, and tobacco use. Lives alone.     Objective    Physical Exam completed virtually:  GENERAL: Alert, awake, cooperative, fatigued looking   HEENT:  MMD  LUNGS: Unlabored on room air  GI: non distended  NEURO: grossly nonfocal exam  PSYCH: flat affect    Vital Signs Reviewed   Blood pressure 113/85, pulse 100, temperature 36.5 °C (97.7 °F), temperature source Temporal, resp. rate 16, height (!) 1.524 m (5'), weight 49.9 kg (110 lb), SpO2 98%.  7 Day Weight Change: Unable to Calculate   Body mass index is 21.48 kg/m².   Weight change:     No intake or output data in the 24 hours ending 07/27/25 1932    Scheduled medications  Scheduled Medications[5]  Continuous medications  Continuous Medications[6]  PRN medications  PRN Medications[7]     Labs Reviewed  Results for orders placed or performed during the hospital encounter of 07/27/25 (from the past 96 hours)   hCG, Urine, Qualitative   Result Value Ref Range    HCG, Urine NEGATIVE NEGATIVE   CBC and Auto Differential   Result Value Ref Range    WBC 8.0 4.4 - 11.3 x10*3/uL    nRBC 0.0 0.0 - 0.0 /100 WBCs    RBC 4.04 4.00 - 5.20 x10*6/uL    Hemoglobin 12.4 12.0 - 16.0 g/dL    Hematocrit 36.2 36.0 - 46.0 %    MCV 90 80 - 100 fL    MCH 30.7 26.0 - 34.0 pg    MCHC 34.3 32.0 - 36.0 g/dL    RDW 11.6 11.5 - 14.5 %    Platelets 318 150 - 450 x10*3/uL    Neutrophils % 69.3 40.0 - 80.0 %    Immature Granulocytes %, Automated 0.3 0.0 - 0.9 %    Lymphocytes % 23.7 13.0 - 44.0 %    Monocytes % 5.8 2.0 - 10.0 %    Eosinophils % 0.1 0.0 - 6.0 %    Basophils % 0.8 0.0 - 2.0 %    Neutrophils Absolute 5.54 1.20 - 7.70 x10*3/uL    Immature Granulocytes Absolute, Automated 0.02 0.00 - 0.70 x10*3/uL    Lymphocytes Absolute 1.89 1.20 - 4.80 x10*3/uL    Monocytes Absolute 0.46 0.10 - 1.00 x10*3/uL    Eosinophils Absolute 0.01 0.00 - 0.70 x10*3/uL    Basophils Absolute 0.06 0.00 - 0.10 x10*3/uL   Magnesium    Result Value Ref Range    Magnesium 2.15 1.60 - 2.40 mg/dL   Phosphorus   Result Value Ref Range    Phosphorus 3.9 2.5 - 4.9 mg/dL   Comprehensive metabolic panel   Result Value Ref Range    Glucose 63 (L) 74 - 99 mg/dL    Sodium 136 136 - 145 mmol/L    Potassium 3.3 (L) 3.5 - 5.3 mmol/L    Chloride 101 98 - 107 mmol/L    Bicarbonate 18 (L) 21 - 32 mmol/L    Anion Gap 20 10 - 20 mmol/L    Urea Nitrogen 11 6 - 23 mg/dL    Creatinine 0.71 0.50 - 1.05 mg/dL    eGFR >90 >60 mL/min/1.73m*2    Calcium 9.2 8.6 - 10.3 mg/dL    Albumin 4.8 3.4 - 5.0 g/dL    Alkaline Phosphatase 44 33 - 110 U/L    Total Protein 7.9 6.4 - 8.2 g/dL    AST 19 9 - 39 U/L    Bilirubin, Total 0.6 0.0 - 1.2 mg/dL    ALT 9 7 - 45 U/L   Lactate   Result Value Ref Range    Lactate 1.1 0.4 - 2.0 mmol/L   Troponin I, High Sensitivity, Initial   Result Value Ref Range    Troponin I, High Sensitivity <3 0 - 13 ng/L   Urinalysis with Reflex Culture and Microscopic   Result Value Ref Range    Color, Urine Yellow Light-Yellow, Yellow, Dark-Yellow    Appearance, Urine Turbid (N) Clear    Specific Gravity, Urine 1.030 1.005 - 1.035    pH, Urine 5.5 5.0, 5.5, 6.0, 6.5, 7.0, 7.5, 8.0    Protein, Urine 70 (1+) (A) NEGATIVE, 10 (TRACE), 20 (TRACE) mg/dL    Glucose, Urine Normal Normal mg/dL    Blood, Urine 1.0 (3+) (A) NEGATIVE mg/dL    Ketones, Urine OVER (4+) (A) NEGATIVE mg/dL    Bilirubin, Urine NEGATIVE NEGATIVE mg/dL    Urobilinogen, Urine Normal Normal mg/dL    Nitrite, Urine NEGATIVE NEGATIVE    Leukocyte Esterase, Urine 250 Zach/uL (A) NEGATIVE   Microscopic Only, Urine   Result Value Ref Range    WBC, Urine 11-20 (A) 1-5, NONE /HPF    RBC, Urine 11-20 (A) NONE, 1-2, 3-5 /HPF    Squamous Epithelial Cells, Urine 1-9 (SPARSE) Reference range not established. /HPF    Bacteria, Urine 1+ (A) NONE SEEN /HPF    Mucus, Urine 1+ Reference range not established. /LPF    Amorphous Crystals, Urine 1+ NONE, 1+, 2+ /HPF   Troponin, High Sensitivity, 1 Hour   Result  "Value Ref Range    Troponin I, High Sensitivity <3 0 - 13 ng/L   Lipase   Result Value Ref Range    Lipase 106 (H) 9 - 82 U/L       Micro/ID   No results found for the last 90 days.    No results found for: \"URINECULTURE\", \"BLOODCULT\", \"CSFCULTSMEAR\"    Imaging Reviewed   Imaging  CT abdomen pelvis w IV contrast  Result Date: 7/27/2025  1.  Limited assessment of stomach due to underdistention, however there is some possible mild distal gastric wall thickening, could be due to gastritis although nonspecific. 2. Incidental 1 cm left hepatic lesion has features which are suggestive of hemangioma. No evidence of underlying liver disease. 3. Mildly prominent dense colonic stool, likely containing previously ingested radiopaque medication (such as Pepto-Bismol). Correlate clinically. No obstruction or definite acute process of the bowel.       Signed by: Maral Art 7/27/2025 4:11 PM Dictation workstation:   [id number]    US right upper quadrant  Result Date: 7/27/2025  Essentially unremarkable exam without definite gallstones.   MACRO: None   Signed by: Usha Wilson 7/27/2025 2:13 PM Dictation workstation:   [id number]    NM GASTRIC EMPTYING SOLID  Result Date: 7/24/2025  IMPRESSION: Normal gastric emptying rate for the solid meal. : MANAD   Transcribe Date/Time: Jul 24 2025  2:24P Dictated by : CAROL MARIN MD This examination was interpreted and the report reviewed and electronically signed by: CAROL MARIN MD on Jul 24 2025  2:24PM  EST    CT abdomen pelvis w IV contrast  Result Date: 7/23/2025  IMPRESSION: No acute process in the abdomen or pelvis. : [facility]   Transcribe Date/Time: Jul 23 2025  3:13P Dictated by : VASHTI WOLFE MD This examination was interpreted and the report reviewed and electronically signed by: VASHTI WOLFE MD on Jul 23 2025  3:17PM  EST      Cardiology, Vascular, and Other Imaging  NM GASTRIC EMPTYING SOLID  Result Date: 7/24/2025  * * *Final Report* * * DATE OF " EXAM: Jul 24 2025  1:21PM   [facility]   0017  -  NM GASTRIC EMPTYING SOLID  / ACCESSION #  [id number] PROCEDURE REASON: Nausea      * * * * Physician Interpretation * * * *  EXAM: SOLID MEAL GASTRIC EMPTYING STUDY HISTORY: Nausea     Assess for abnormal gastric emptying of a solid meal. TECHNIQUE: 1.0 millicuries of Tc-99m sulfur colloid was administered PO. The patient ingested meal consisted of 4 ounces of egg beater 2 pieces of toast 1 ounce of jelly and 8 ounces of water, consumed over 10 minutes.   Standard adult meal consists of 4 oz Egg Beaters, equivalent of 2 slices of toast, 1 oz jelly, and 8 oz water. Planar anterior and posterior images of the gastric region were obtained at 0, 1, 2, and 4 hours after ingestion. RESULTS: Calculated solid meal gastric retention values: *  43% retention at 1 hour (normal range, 37-90%; accelerated emptying defined as <30% at 1 hour) *  1% retention at 2 hours (normal range, 30-60%) *  0% retention at 4 hours (normal range, 0-10%)    IMPRESSION: Normal gastric emptying rate for the solid meal. : MANDA   Transcribe Date/Time: Jul 24 2025  2:24P Dictated by : CAROL MARIN MD This examination was interpreted and the report reviewed and electronically signed by: CAROL MARIN MD on Jul 24 2025  2:24PM  EST    CT abdomen pelvis w IV contrast  Result Date: 7/23/2025  * * *Final Report* * * DATE OF EXAM: Jul 23 2025  3:09PM   [facility]   0530  -  CT ABD/PEL W IVCON  / ACCESSION #  [id number] PROCEDURE REASON: multiple diagnoses      * * * * Physician Interpretation * * * *  EXAMINATION:  CT ABDOMEN AND PELVIS WITH IV CONTRAST CLINICAL HISTORY: 25-year-old woman with intractable nausea/vomiting and abdominal pain TECHNIQUE: CT of the abdomen and pelvis was performed using standard technique, scanning from just above the dome of the diaphragm to the   symphysis pubis. MQ:  CTAP_3 Contrast: IV:  80 ml of Omnipaque 350 Oral:  100 ml of Omni 240 10-25ml diluted with water CT  Radiation dose: Integrated Dose-length product (DLP) for this visit =   236 mGy*cm. CT Dose Reduction Employed: Automated exposure control (AEC) COMPARISON: None. RESULT: Liver: Few subcentimeter low-attenuation lesions, which are too small to characterize, but likely benign. No mass otherwise. Biliary: No bile duct dilation.  Gallbladder is unremarkable. Spleen: No mass. No splenomegaly. Pancreas: No mass or duct dilation. Adrenals: No mass. Kidneys: No mass, calculus or hydronephrosis. GI tract: No dilation or wall thickening. Normal appendix. Lymph nodes: No abdominal or pelvic lymphadenopathy. Mesentery/Peritoneum: No ascites or mass. Retroperitoneum: No mass. Vasculature:  - Abdominal aorta and iliac arteries: No aneurysm.  - Celiac and SMA: Patent without stenosis.  - Portal venous system (SMV, splenic vein, portal vein and branches): Patent.  - Hepatic veins: Patent. Pelvis: No mass, ascites or fluid collection. Bones/Soft Tissues: No significant finding. Lower thorax: Unremarkable. Localizer images: No additional findings.    IMPRESSION: No acute process in the abdomen or pelvis. : PSCB   Transcribe Date/Time: Jul 23 2025  3:13P Dictated by : VASHTI WOLFE MD This examination was interpreted and the report reviewed and electronically signed by: VASHTI WOLFE MD on Jul 23 2025  3:17PM  EST      Assessment and Plan    Essence Almodovar is a 25 y.o. female presented with Intractable nausea and vomiting as the principal problem.    #Intractable nausea and vomiting, unknown cause  #Gastritis   #Constipation  #Mildly elevated lipase  -IVF: D5W in NS at 100ml/hr  -NPO  for now to rest the GI tract  -Protonix 40mg BID  -Will schedule zofran 4mg Q6, reglan 10mg q6, and famotidine 20mg IV  -Trial benadryl 25mg IV x1 made her nausea feel worse  -Provided 1x decadron 4mg IV   -Unable to tolerate compazine  -UA concerning fro UTI, so will treat that  -Check UDS  -Check TSH to rule out thyroid dysequilibrium  given hx subclinical hypothryoid that can progress  -Repeat chem, cbc, and lipase     #Constipation  -Consider dulcolax supp tomorrow if no BM and still unable to begin a PO bowel regimen  -Has been constipated for the past few days and she attributes it to poor po intake     #Hypokalemia  -Repleted K with 40meq IV x1  -Mag noted WNL  -recheck K+  in the AM    #Hypoglycemia  -Added dextrose to the IVF  -Check glucose Q6hrs x3    #Concern for UTI   -ceftriaxone 1gm IV Q 24 hrs  -got bactrim in the ED, but unable to tolerate much po at this time  -await culture    #Prophylaxis  -GI ppx: Protonix, bowel regimen  -VTE ppx: Ambulate       Chronic Medical Condition Plan  #POTS  #Lupus   -Resume home meds when able     Anticipated Dispo: Return home.       Complexity and Visit Type   This visit was performed via synchronous audio and video communication.      Total time spent 60 minutes: Reviewing patient's medical records, collecting history from the patient,  providing counseling and education on condition and mgmt , placing pertinent orders for labs/tests/medications,  communicating with the ED MD,  documenting in the patient's EMR/formulation of this note, and independently interpreting results and data.   Signature    07/27/25 at 9:09 PM - SHANELL Caceres-CNP         [1]   Past Medical History:  Diagnosis Date    ADHD 09/02/2020    ADHD (attention deficit hyperactivity disorder)     Allergic     Eating disorder     Eczema     Headache     Irregular heart beat 09/02/2020    Scoliosis     Visual impairment    [2]   Past Surgical History:  Procedure Laterality Date    WISDOM TOOTH EXTRACTION     [3]   Family History  Problem Relation Name Age of Onset    No Known Problems Mother      No Known Problems Father     [4]   Allergies  Allergen Reactions    Pineapple Anaphylaxis, Unknown and Swelling    Amoxicillin Hives     Patient does not want testing    Azithromycin Diarrhea     Pt reports severe diarrhea and  yellowing of her skin with the last course of azithromycin that she took 1/10/2025   [5] diphenhydrAMINE, 25 mg, intravenous, Once  [START ON 7/28/2025] pantoprazole, 40 mg, intravenous, BID  potassium chloride, 20 mEq, intravenous, q2h  [6] D5 % and 0.9 % sodium chloride, 100 mL/hr  [7] PRN medications: acetaminophen, melatonin, sennosides-docusate sodium

## 2025-07-27 NOTE — ED PROVIDER NOTES
History of Present Illness   HPI:    This is a 25-year-old female with a history of POTS, and lupus, on beta-blocker and Plaquenil, presenting to the emergency room with persistent abdominal pain and nausea.    Patient presented to the ED on 7/25 for similar symptoms and was admitted for ops for persistent nausea and not being able to tolerate p.o.  Over the course of the observation, patient stated she felt better and was discharged yesterday however she states that she still continues to feel nauseated and the pain is becoming worse.    She endorses Zofran and Reglan helping control her nausea but even if she takes a sip of water she is feels ill.  Her epigastric pain is primarily around the epigastric region but can radiate to the left upper and right upper quadrant.    She denies any fevers but she has felt chills.  She denies any shortness of breath but does endorse epigastric chest pain.  She denies any vaginal bleeding or discharge, no dysuria.      ROS: As detailed above.    Physical Exam   Triage vitals:  T 36.5 °C (97.7 °F)  HR 66  /79  RR 16  O2 97 % None (Room air)    Physical Exam  Vitals and nursing note reviewed.   Constitutional:       General: She is not in acute distress.     Appearance: Normal appearance. She is not ill-appearing.   HENT:      Head: Normocephalic and atraumatic.     Eyes:      Extraocular Movements: Extraocular movements intact.      Pupils: Pupils are equal, round, and reactive to light.       Cardiovascular:      Rate and Rhythm: Normal rate and regular rhythm.      Pulses: Normal pulses.      Heart sounds: No murmur heard.  Pulmonary:      Effort: Pulmonary effort is normal. No respiratory distress.      Breath sounds: No wheezing, rhonchi or rales.   Abdominal:      General: Abdomen is flat. Bowel sounds are normal. There is no distension.      Tenderness: There is abdominal tenderness in the right upper quadrant, epigastric area and left upper quadrant. There is  guarding. There is no rebound.     Musculoskeletal:      Right lower leg: No edema.      Left lower leg: No edema.     Skin:     General: Skin is warm.      Capillary Refill: Capillary refill takes less than 2 seconds.     Neurological:      General: No focal deficit present.      Mental Status: She is alert and oriented to person, place, and time.         Medical Decision Making   25 y.o. female presenting with persistent abdominal pain, nausea, vomiting    Differential diagnoses considered include but are not limited to: Pancreatitis, cholecystitis, appendicitis, bowel obstruction, dehydration, kidney dysfunction    Vital signs are reassuring.  Physical exam as detailed above.  Overall this is a well-appearing patient but who appears somewhat uncomfortable.  Her abdomen appears to be diffusely tender, with guarding but no rebound.    Patient be given fluids, antiemetics, analgesia, and a full lab panel will be obtained.    Cesar workup most notable for a slightly elevated lipase.  This is downtrending from prior.  Patient denies any alcohol usage.  And CMP showing no signs of transaminitis, elevated alk phos or elevated bilirubin.  However, given that this is the patient's second time she was here, we obtained an ultrasound of the right upper quadrant which per radiology overread did not demonstrate any acute findings.    CBC within normal limits, no leukocytosis or anemia.  Lactic within normal limit.  Troponin negative.    CT scan per my depend interpretation does not demonstrate any concern for small bowel obstruction.  Over read by radiology.  There is concern for possible gastritis which could be the patient's cause of symptoms she is experiencing.  We started the patient on Protonix IV, gave her Maalox which she was unfortunately unable to tolerate.    We also try droperidol however she continued to feel the symptoms.    UA was notable for possible UTI, patient to be treated empirically with Bactrim.    As  such patient will be admitted to the observation unit for further management.  Findings discussed with Dr. Lugo who agrees with admission.      Relevant chronic conditions impacting this encounter include lupus, POTS.    SDoH considerations: None identified    Consults/services involved: Hospitalist/Admitting Provider who accepted the patient for admission      Results  Labs, imaging, and EKG reviewed and interpreted by me. Please see MDM and ED Course for additional information.    ED Course     ED Course as of 07/27/25 1906   Sun Jul 27, 2025   1240 Per chart review, patient had a CT abdomen pelvis as well as a gastric emptying study performed on 7/23/2025.  CT imaging showed no concerning or identifiable cause of the patient's symptoms.    The gastric emptying study showed 43% retention at 1 hour with a normal range being 37 to 90% and accelerated emptying being less than 30%.  1% retention at 2 hours with normal range being 30 to 60%. [JS]   1754 Will treat empirically for UTI. [JS]   1758 Paged observation team for admission. [JS]   1813 They are asking if we can try droperidol prior to potentially admitting the patient. [JS]      ED Course User Index  [JS] Efrem Henry MD         Diagnoses as of 07/27/25 1906   Epigastric pain   Nausea and vomiting, unspecified vomiting type   Acute cystitis with hematuria   Elevated lipase       Disposition   Admission for observation.    Procedures   Procedures    Efrem Henry MD  Emergency Medicine     Efrem Henry MD  07/27/25 1959

## 2025-07-27 NOTE — ED TRIAGE NOTES
Pt c/o ABD pain x 2 days with N/V. Pt reports she was seen here 2 days ago and now she feels worse.

## 2025-07-28 PROBLEM — R10.13 EPIGASTRIC PAIN: Status: ACTIVE | Noted: 2025-07-28

## 2025-07-28 LAB
ALBUMIN SERPL BCP-MCNC: 4.1 G/DL (ref 3.4–5)
ALP SERPL-CCNC: 38 U/L (ref 33–110)
ALT SERPL W P-5'-P-CCNC: 7 U/L (ref 7–45)
ANION GAP SERPL CALC-SCNC: 13 MMOL/L (ref 10–20)
AST SERPL W P-5'-P-CCNC: 16 U/L (ref 9–39)
BACTERIA UR CULT: NORMAL
BASOPHILS # BLD AUTO: 0.02 X10*3/UL (ref 0–0.1)
BASOPHILS NFR BLD AUTO: 0.3 %
BILIRUB SERPL-MCNC: 0.4 MG/DL (ref 0–1.2)
BUN SERPL-MCNC: 5 MG/DL (ref 6–23)
CALCIUM SERPL-MCNC: 8.9 MG/DL (ref 8.6–10.3)
CHLORIDE SERPL-SCNC: 104 MMOL/L (ref 98–107)
CO2 SERPL-SCNC: 21 MMOL/L (ref 21–32)
CREAT SERPL-MCNC: 0.65 MG/DL (ref 0.5–1.05)
EGFRCR SERPLBLD CKD-EPI 2021: >90 ML/MIN/1.73M*2
EOSINOPHIL # BLD AUTO: 0 X10*3/UL (ref 0–0.7)
EOSINOPHIL NFR BLD AUTO: 0 %
ERYTHROCYTE [DISTWIDTH] IN BLOOD BY AUTOMATED COUNT: 11.6 % (ref 11.5–14.5)
GLUCOSE BLD MANUAL STRIP-MCNC: 113 MG/DL (ref 74–99)
GLUCOSE BLD MANUAL STRIP-MCNC: 113 MG/DL (ref 74–99)
GLUCOSE BLD MANUAL STRIP-MCNC: 141 MG/DL (ref 74–99)
GLUCOSE BLD MANUAL STRIP-MCNC: 157 MG/DL (ref 74–99)
GLUCOSE BLD MANUAL STRIP-MCNC: 157 MG/DL (ref 74–99)
GLUCOSE SERPL-MCNC: 163 MG/DL (ref 74–99)
HCT VFR BLD AUTO: 32.8 % (ref 36–46)
HGB BLD-MCNC: 11.5 G/DL (ref 12–16)
IMM GRANULOCYTES # BLD AUTO: 0.04 X10*3/UL (ref 0–0.7)
IMM GRANULOCYTES NFR BLD AUTO: 0.5 % (ref 0–0.9)
LIPASE SERPL-CCNC: 149 U/L (ref 9–82)
LYMPHOCYTES # BLD AUTO: 1 X10*3/UL (ref 1.2–4.8)
LYMPHOCYTES NFR BLD AUTO: 12.9 %
MAGNESIUM SERPL-MCNC: 1.99 MG/DL (ref 1.6–2.4)
MCH RBC QN AUTO: 30.3 PG (ref 26–34)
MCHC RBC AUTO-ENTMCNC: 35.1 G/DL (ref 32–36)
MCV RBC AUTO: 87 FL (ref 80–100)
MONOCYTES # BLD AUTO: 0.09 X10*3/UL (ref 0.1–1)
MONOCYTES NFR BLD AUTO: 1.2 %
NEUTROPHILS # BLD AUTO: 6.63 X10*3/UL (ref 1.2–7.7)
NEUTROPHILS NFR BLD AUTO: 85.1 %
NRBC BLD-RTO: 0 /100 WBCS (ref 0–0)
PLATELET # BLD AUTO: 319 X10*3/UL (ref 150–450)
POTASSIUM SERPL-SCNC: 4.6 MMOL/L (ref 3.5–5.3)
PROT SERPL-MCNC: 6.9 G/DL (ref 6.4–8.2)
RBC # BLD AUTO: 3.79 X10*6/UL (ref 4–5.2)
SODIUM SERPL-SCNC: 133 MMOL/L (ref 136–145)
WBC # BLD AUTO: 7.8 X10*3/UL (ref 4.4–11.3)

## 2025-07-28 PROCEDURE — 2500000001 HC RX 250 WO HCPCS SELF ADMINISTERED DRUGS (ALT 637 FOR MEDICARE OP): Performed by: INTERNAL MEDICINE

## 2025-07-28 PROCEDURE — 2500000004 HC RX 250 GENERAL PHARMACY W/ HCPCS (ALT 636 FOR OP/ED): Performed by: INTERNAL MEDICINE

## 2025-07-28 PROCEDURE — 92610 EVALUATE SWALLOWING FUNCTION: CPT | Mod: GN | Performed by: PHARMACIST

## 2025-07-28 PROCEDURE — 2500000004 HC RX 250 GENERAL PHARMACY W/ HCPCS (ALT 636 FOR OP/ED): Performed by: NURSE PRACTITIONER

## 2025-07-28 PROCEDURE — 82947 ASSAY GLUCOSE BLOOD QUANT: CPT

## 2025-07-28 PROCEDURE — 2500000004 HC RX 250 GENERAL PHARMACY W/ HCPCS (ALT 636 FOR OP/ED): Performed by: STUDENT IN AN ORGANIZED HEALTH CARE EDUCATION/TRAINING PROGRAM

## 2025-07-28 PROCEDURE — 83735 ASSAY OF MAGNESIUM: CPT | Performed by: NURSE PRACTITIONER

## 2025-07-28 PROCEDURE — 83690 ASSAY OF LIPASE: CPT | Performed by: NURSE PRACTITIONER

## 2025-07-28 PROCEDURE — 85025 COMPLETE CBC W/AUTO DIFF WBC: CPT | Performed by: NURSE PRACTITIONER

## 2025-07-28 PROCEDURE — 80053 COMPREHEN METABOLIC PANEL: CPT | Performed by: NURSE PRACTITIONER

## 2025-07-28 PROCEDURE — 1200000002 HC GENERAL ROOM WITH TELEMETRY DAILY

## 2025-07-28 PROCEDURE — 36415 COLL VENOUS BLD VENIPUNCTURE: CPT | Performed by: NURSE PRACTITIONER

## 2025-07-28 RX ORDER — METOCLOPRAMIDE HYDROCHLORIDE 5 MG/ML
10 INJECTION INTRAMUSCULAR; INTRAVENOUS EVERY 6 HOURS PRN
Status: DISCONTINUED | OUTPATIENT
Start: 2025-07-28 | End: 2025-07-31 | Stop reason: HOSPADM

## 2025-07-28 RX ORDER — ONDANSETRON HYDROCHLORIDE 2 MG/ML
4 INJECTION, SOLUTION INTRAVENOUS EVERY 6 HOURS PRN
Status: DISCONTINUED | OUTPATIENT
Start: 2025-07-28 | End: 2025-07-31 | Stop reason: HOSPADM

## 2025-07-28 RX ORDER — METOPROLOL SUCCINATE 25 MG/1
12.5 TABLET, EXTENDED RELEASE ORAL DAILY
Status: DISCONTINUED | OUTPATIENT
Start: 2025-07-28 | End: 2025-07-31 | Stop reason: HOSPADM

## 2025-07-28 RX ORDER — HYDROXYCHLOROQUINE SULFATE 200 MG/1
200 TABLET, FILM COATED ORAL DAILY
Status: DISCONTINUED | OUTPATIENT
Start: 2025-07-28 | End: 2025-07-31 | Stop reason: HOSPADM

## 2025-07-28 RX ORDER — LANOLIN ALCOHOL/MO/W.PET/CERES
200 CREAM (GRAM) TOPICAL NIGHTLY
Status: DISCONTINUED | OUTPATIENT
Start: 2025-07-28 | End: 2025-07-31 | Stop reason: HOSPADM

## 2025-07-28 RX ADMIN — METOCLOPRAMIDE 10 MG: 5 INJECTION, SOLUTION INTRAMUSCULAR; INTRAVENOUS at 12:04

## 2025-07-28 RX ADMIN — METOCLOPRAMIDE 10 MG: 5 INJECTION, SOLUTION INTRAMUSCULAR; INTRAVENOUS at 00:58

## 2025-07-28 RX ADMIN — Medication 0.5 TABLET: at 21:18

## 2025-07-28 RX ADMIN — PANTOPRAZOLE SODIUM 40 MG: 40 INJECTION, POWDER, FOR SOLUTION INTRAVENOUS at 21:17

## 2025-07-28 RX ADMIN — ONDANSETRON 4 MG: 2 INJECTION, SOLUTION INTRAMUSCULAR; INTRAVENOUS at 18:48

## 2025-07-28 RX ADMIN — METOPROLOL SUCCINATE 12.5 MG: 25 TABLET, EXTENDED RELEASE ORAL at 15:15

## 2025-07-28 RX ADMIN — ONDANSETRON 4 MG: 2 INJECTION, SOLUTION INTRAMUSCULAR; INTRAVENOUS at 08:42

## 2025-07-28 RX ADMIN — METOCLOPRAMIDE 10 MG: 5 INJECTION, SOLUTION INTRAMUSCULAR; INTRAVENOUS at 06:24

## 2025-07-28 RX ADMIN — ONDANSETRON 4 MG: 2 INJECTION, SOLUTION INTRAMUSCULAR; INTRAVENOUS at 04:43

## 2025-07-28 RX ADMIN — FAMOTIDINE 20 MG: 10 INJECTION, SOLUTION INTRAVENOUS at 21:17

## 2025-07-28 RX ADMIN — PANTOPRAZOLE SODIUM 40 MG: 40 INJECTION, POWDER, FOR SOLUTION INTRAVENOUS at 08:42

## 2025-07-28 RX ADMIN — HYDROXYCHLOROQUINE SULFATE 200 MG: 200 TABLET, FILM COATED ORAL at 08:43

## 2025-07-28 RX ADMIN — CEFTRIAXONE 1 G: 1 INJECTION, SOLUTION INTRAVENOUS at 21:17

## 2025-07-28 RX ADMIN — METOCLOPRAMIDE 10 MG: 5 INJECTION, SOLUTION INTRAMUSCULAR; INTRAVENOUS at 21:18

## 2025-07-28 RX ADMIN — FAMOTIDINE 20 MG: 10 INJECTION, SOLUTION INTRAVENOUS at 08:42

## 2025-07-28 RX ADMIN — DEXTROSE AND SODIUM CHLORIDE 100 ML/HR: 5; .9 INJECTION, SOLUTION INTRAVENOUS at 06:24

## 2025-07-28 ASSESSMENT — COGNITIVE AND FUNCTIONAL STATUS - GENERAL
PATIENT BASELINE BEDBOUND: NO
MOBILITY SCORE: 24
MOBILITY SCORE: 24
DAILY ACTIVITIY SCORE: 24
MOBILITY SCORE: 24

## 2025-07-28 ASSESSMENT — PAIN - FUNCTIONAL ASSESSMENT
PAIN_FUNCTIONAL_ASSESSMENT: 0-10

## 2025-07-28 ASSESSMENT — ACTIVITIES OF DAILY LIVING (ADL): LACK_OF_TRANSPORTATION: NO

## 2025-07-28 ASSESSMENT — PAIN SCALES - GENERAL
PAINLEVEL_OUTOF10: 0 - NO PAIN

## 2025-07-28 NOTE — PROGRESS NOTES
Transitional Care Coordination Progress Note:  Plan per Medical/Surgical team: treatment of abd pain, N/V, gastritis, UTI, constipation, hypoglycemia, hyponatremia with IV ATB, IV fluids, protonix, pepcid, reglan, zofran   Status: Observation day 1  Payor source: [place]  Discharge disposition: Home alone  Potential Barriers: glucose 163,   ADOD: 7/29/2025   SHARLA Jimenez RN, BSN Transitional Care Coordinator ED# [phone]      07/28/25 0702   Discharge Planning   Living Arrangements Alone   Support Systems Friends/neighbors   Assistance Needed IV ATB, IV fluids, protonix, pepcid, reglan, zofran   Type of Residence Private residence   Number of Stairs to Enter Residence 0   Number of Stairs Within Residence 0   Do you have animals or pets at home? No   Home or Post Acute Services None   Expected Discharge Disposition Home   Does the patient need discharge transport arranged? No   Has discharge transport been arranged? No   Financial Resource Strain   How hard is it for you to pay for the very basics like food, housing, medical care, and heating? Not very   Housing Stability   In the last 12 months, was there a time when you were not able to pay the mortgage or rent on time? N   In the past 12 months, how many times have you moved where you were living? 0   At any time in the past 12 months, were you homeless or living in a shelter (including now)? N   Transportation Needs   In the past 12 months, has lack of transportation kept you from medical appointments or from getting medications? no   In the past 12 months, has lack of transportation kept you from meetings, work, or from getting things needed for daily living? No   Stroke Family Assessment   Stroke Family Assessment Needed No   Intensity of Service   Intensity of Service 0-30 min

## 2025-07-28 NOTE — PROGRESS NOTES
Subjective:  Patient was not able to eat her food and felt nauseated, she mentioned she has a virtual appointment with GI tomorrow.     Patient denies recent fever, chills, chest pain, palpations, leg edema, SOB, cough, abd pain, urinary sx, bloody vomit or stools, headaches, lightheadedness or syncope, weakness, or trauma/travel/sick contacts.      Vitals (Last 24 Hours):  Heart Rate:  []   Temp:  [35.9 °C (96.6 °F)-36.4 °C (97.5 °F)]   Resp:  [16-20]   BP: ()/(64-85)   Weight:  [50 kg (110 lb 3.2 oz)]   SpO2:  [97 %-100 %]       I have reviewed imaging reports and labs from this visit    PHYSICAL EXAM:  Constitutional: NAD, alert and cooperative  Eyes: no icterus  ENMT: mucous membranes moist, no lesions  Head/Neck: supple  Respiratory/Thorax: CTA bilaterally, non-labored breathing, no cough, on RA  Cardiovascular: RRR, no murmurs heard  Gastrointestinal: ND/S/NT  : no [place], no SP/flank discomfort  Musculoskeletal: no joint swelling, ROM intact  Extremities: no edema  Neurological: non-focal  Skin: warm and dry  Psych: calm, stable mood     MEDS:  Scheduled meds  cefTRIAXone, 1 g, intravenous, q24h  famotidine, 20 mg, intravenous, q12h JESUS  hydroxychloroquine, 200 mg, oral, Daily  magnesium oxide, 200 mg of magnesium oxide, oral, Nightly  metoprolol succinate XL, 12.5 mg, oral, Daily  pantoprazole, 40 mg, intravenous, BID    Continuous meds  Continuous Medications[1]    PRN meds  PRN Medications[2]      ASSESSMENT/PLAN:  Essence Almodovar is a 25 y.o. female presented with Intractable nausea and vomiting as the principal problem.     #Intractable nausea and vomiting, unknown cause  #Gastritis   #Mildly elevated lipase 106->149  - CTAP 7/27 some possible mild distal gastric wall thickening, could be    due to gastritis although nonspecific.    Incidental 1 cm left hepatic lesion has features which are    suggestive of hemangioma. No evidence of underlying liver disease.    Mildly prominent dense colonic  stool, likely containing previously    ingested radiopaque medication (such as Pepto-Bismol). Correlate    clinically. No obstruction or definite acute process of the bowel.  - Patient had EGD on 5/30 - Erosive gastropathy with no bleeding and no      stigmata of recent bleeding. Biopsied. Patient was recommended 6 weeks of Prilosec  - Patient does have follow up GI virtual appointment tomorrow with [facility]  -IVF: D5W in NS at 100ml/hr  -Protonix 40mg BID  -Will schedule zofran 4mg Q6, reglan 10mg q6, and famotidine 20mg IV  -Trial benadryl 25mg IV x1 made her nausea feel worse  -Provided 1x decadron 4mg IV   -Unable to tolerate compazine  -UA concerning fro UTI, so will treat that  -Check Urine drug screen positive for Opioids, Patient got Morphine in ED  -Check TSH 1.22       N/V likely from Eating disorder  Hx of Anorexia Nervosa  Hx of Visual and  Auditory Hallucinations  - Consult place for Psychiatry  - Did speak to Patient's PCP, according to her Patient's  psychiatric problems which are almost certainly contributing to her physical   symptoms. She has previously admitted to visual and auditory hallucinations. She has had extensive testing by multiple medical specialists, but likely did not had a thorough psych evaluation    #Constipation  -Consider dulcolax supp tomorrow if no BM and still unable to begin a PO bowel regimen  -Has been constipated for the past few days and she attributes it to poor po intake      #Hypokalemia  - resolved K 3.3->4.6     #Hypoglycemia  -Added dextrose to the IVF  -Check glucose Q6hrs x3     #Concern for UTI   -ceftriaxone 1gm IV Q 24 hrs  -got bactrim in the ED, but unable to tolerate much po at this time  -await culture     #Prophylaxis  -GI ppx: Protonix, bowel regimen  -VTE ppx: Ambulate       Chronic Medical Condition Plan  #POTS  #Lupus   -Resumed home Metop at 12.5mg due to low BP     Other comorbidities as above  -continue medications as ordered and adjust based on clinical  course     VTE / GI prophylaxis   -Low risk for DVT, SCD ordered,  PPI, bowel regimen in place     Discharge planning  - Patient to be discharged home once able to tolerate PO diet. Patient still seems very weak and has not been able to tolerate PO intake    Discussed with Dr. Lugo and the interdisciplinary team   Patient accepted by Dr. Forte. Hospitalist team.     Viridiana Early, APRN-CNP         [1] D5 % and 0.9 % sodium chloride, 100 mL/hr, Last Rate: 100 mL/hr (07/28/25 1456)    [2] PRN medications: acetaminophen, melatonin, metoclopramide, ondansetron, sennosides-docusate sodium

## 2025-07-28 NOTE — HOSPITAL COURSE
Has anyone considered a psych eval for Essence? She does have medical history including Lupus and POTS, but she also has psychiatric problems which are almost certainly contributing to her physical symptoms. She has previously admitted to visual and auditory hallucinations. She has had extensive testing by multiple medical specialists, but has not had a thorough psych eval to the best of my knowledge.       She does not bring up the psych stuff herself. She really, really likes medical attention, even when exam findings are unimpressive. She is employed by [employer] and is frequently sent to ER for eval due to symptoms that occur during her shift. Since she started seeing me in 10/2024, she will fixate on one primary problem at a time. She will seek care from every specialist and have every test possible before moving on to the next primary ailment that requires thorough evaluation, but there is always the underlying c/o dizziness, abdominal/epigastric pain, and nausea/bilious vomiting. She requests Zofran from every provider and I think is using it routinely. I just reordered it for her 2 weeks ago and see she got it ordered at discharge from [facility] [employer] [facility] [employer] yesterday. She goes to all of the health systems, and I think there's a lot more to her mental health story than she's letting us know.

## 2025-07-28 NOTE — CARE PLAN
The patient's goals for the shift include to sleep    The clinical goals for the shift include to maintain safety monitor and manage nausea and vomiting      Problem: Safety - Adult  Goal: Free from fall injury  Outcome: Progressing

## 2025-07-28 NOTE — CARE PLAN
The patient's goals for the shift include to sleep    The clinical goals for the shift include to maintain safety monitor and manage nausea and vomiting      Problem: Pain - Adult  Goal: Verbalizes/displays adequate comfort level or baseline comfort level  Outcome: Progressing     Problem: Safety - Adult  Goal: Free from fall injury  Outcome: Progressing     Problem: Discharge Planning  Goal: Discharge to home or other facility with appropriate resources  Outcome: Progressing     Problem: Chronic Conditions and Co-morbidities  Goal: Patient's chronic conditions and co-morbidity symptoms are monitored and maintained or improved  Outcome: Progressing     Problem: Nutrition  Goal: Nutrient intake appropriate for maintaining nutritional needs  Outcome: Progressing

## 2025-07-28 NOTE — PROGRESS NOTES
07/28/25 0702   ACS Disability Status   Are you deaf or do you have serious difficulty hearing? N   Are you blind or do you have serious difficulty seeing, even when wearing glasses? N   Because of a physical, mental, or emotional condition, do you have serious difficulty concentrating, remembering, or making decisions? (5 years old or older) N  (hx of POTS & lupus)   Do you have serious difficulty walking or climbing stairs? N   Do you have serious difficulty dressing or bathing? N   Because of a physical, mental, or emotional condition, do you have serious difficulty doing errands alone such as visiting the doctor? N

## 2025-07-29 LAB
ATRIAL RATE: 85 BPM
P AXIS: 68 DEGREES
P OFFSET: 201 MS
P ONSET: 155 MS
PR INTERVAL: 130 MS
Q ONSET: 220 MS
QRS COUNT: 14 BEATS
QRS DURATION: 80 MS
QT INTERVAL: 376 MS
QTC CALCULATION(BAZETT): 447 MS
QTC FREDERICIA: 422 MS
R AXIS: 78 DEGREES
T AXIS: 57 DEGREES
T OFFSET: 408 MS
VENTRICULAR RATE: 85 BPM

## 2025-07-29 PROCEDURE — 99233 SBSQ HOSP IP/OBS HIGH 50: CPT | Performed by: STUDENT IN AN ORGANIZED HEALTH CARE EDUCATION/TRAINING PROGRAM

## 2025-07-29 PROCEDURE — 1200000002 HC GENERAL ROOM WITH TELEMETRY DAILY

## 2025-07-29 PROCEDURE — 2500000001 HC RX 250 WO HCPCS SELF ADMINISTERED DRUGS (ALT 637 FOR MEDICARE OP): Performed by: STUDENT IN AN ORGANIZED HEALTH CARE EDUCATION/TRAINING PROGRAM

## 2025-07-29 PROCEDURE — 2500000004 HC RX 250 GENERAL PHARMACY W/ HCPCS (ALT 636 FOR OP/ED): Performed by: STUDENT IN AN ORGANIZED HEALTH CARE EDUCATION/TRAINING PROGRAM

## 2025-07-29 PROCEDURE — 2500000004 HC RX 250 GENERAL PHARMACY W/ HCPCS (ALT 636 FOR OP/ED): Performed by: INTERNAL MEDICINE

## 2025-07-29 PROCEDURE — 2500000001 HC RX 250 WO HCPCS SELF ADMINISTERED DRUGS (ALT 637 FOR MEDICARE OP): Performed by: INTERNAL MEDICINE

## 2025-07-29 PROCEDURE — 2500000004 HC RX 250 GENERAL PHARMACY W/ HCPCS (ALT 636 FOR OP/ED): Performed by: NURSE PRACTITIONER

## 2025-07-29 RX ORDER — SUCRALFATE 1 G/1
1 TABLET ORAL
Status: DISCONTINUED | OUTPATIENT
Start: 2025-07-29 | End: 2025-07-31 | Stop reason: HOSPADM

## 2025-07-29 RX ORDER — AMITRIPTYLINE HYDROCHLORIDE 25 MG/1
25 TABLET, FILM COATED ORAL NIGHTLY
Status: DISCONTINUED | OUTPATIENT
Start: 2025-07-29 | End: 2025-07-31 | Stop reason: HOSPADM

## 2025-07-29 RX ORDER — RIBOFLAVIN (VITAMIN B2) 100 MG
200 TABLET ORAL 2 TIMES DAILY
Status: DISCONTINUED | OUTPATIENT
Start: 2025-07-30 | End: 2025-07-31 | Stop reason: HOSPADM

## 2025-07-29 RX ADMIN — PANTOPRAZOLE SODIUM 40 MG: 40 INJECTION, POWDER, FOR SOLUTION INTRAVENOUS at 20:32

## 2025-07-29 RX ADMIN — SUCRALFATE 1 G: 1 TABLET ORAL at 17:38

## 2025-07-29 RX ADMIN — METOPROLOL SUCCINATE 12.5 MG: 25 TABLET, EXTENDED RELEASE ORAL at 09:27

## 2025-07-29 RX ADMIN — PANTOPRAZOLE SODIUM 40 MG: 40 INJECTION, POWDER, FOR SOLUTION INTRAVENOUS at 09:05

## 2025-07-29 RX ADMIN — METOCLOPRAMIDE 10 MG: 5 INJECTION, SOLUTION INTRAMUSCULAR; INTRAVENOUS at 13:32

## 2025-07-29 RX ADMIN — SUCRALFATE 1 G: 1 TABLET ORAL at 20:31

## 2025-07-29 RX ADMIN — Medication 0.5 TABLET: at 20:31

## 2025-07-29 RX ADMIN — ONDANSETRON 4 MG: 2 INJECTION, SOLUTION INTRAMUSCULAR; INTRAVENOUS at 18:14

## 2025-07-29 RX ADMIN — HYDROXYCHLOROQUINE SULFATE 200 MG: 200 TABLET, FILM COATED ORAL at 09:27

## 2025-07-29 RX ADMIN — AMITRIPTYLINE HYDROCHLORIDE 25 MG: 25 TABLET, FILM COATED ORAL at 20:31

## 2025-07-29 RX ADMIN — FAMOTIDINE 20 MG: 10 INJECTION, SOLUTION INTRAVENOUS at 09:05

## 2025-07-29 RX ADMIN — FAMOTIDINE 20 MG: 10 INJECTION, SOLUTION INTRAVENOUS at 20:31

## 2025-07-29 RX ADMIN — ONDANSETRON 4 MG: 2 INJECTION, SOLUTION INTRAMUSCULAR; INTRAVENOUS at 12:09

## 2025-07-29 ASSESSMENT — COGNITIVE AND FUNCTIONAL STATUS - GENERAL
DAILY ACTIVITIY SCORE: 24
MOBILITY SCORE: 24

## 2025-07-29 ASSESSMENT — PAIN - FUNCTIONAL ASSESSMENT
PAIN_FUNCTIONAL_ASSESSMENT: 0-10
PAIN_FUNCTIONAL_ASSESSMENT: 0-10

## 2025-07-29 ASSESSMENT — PAIN SCALES - GENERAL
PAINLEVEL_OUTOF10: 0 - NO PAIN
PAINLEVEL_OUTOF10: 0 - NO PAIN

## 2025-07-29 NOTE — PROGRESS NOTES
Speech-Language Pathology Clinical Swallow Evaluation and Discharge    Patient Name: Essence Almodovar  MRN: [id number]  : 2000  Today's Date: 25  Start Time: 1745  Stop Time: 1805  Time Calculation (min): 20 min      ASSESSMENT  Impressions:  WFL oral phase and no suspected pharyngeal phase dysphagia based on clinical swallow evaluation. Suspect pt's globus sensation is likely due to nausea/vomiting symptoms. Pt reports that she is allergic to barium, therefore esophagram and MBSS are not warranted.    Pt was educated re: nature of globus sensation, swallow anatomy/physiology, and suggestions to optimize esophageal swallow function. No further skilled SLP needs are identified. will DC. Please re-consult if concerns persist or status worsens.    PLAN  Recommendations:  Is MBSS recommended? No; no pharyngeal dysphagia suspected.  Solid consistency: Per medical team  Liquid consistency: Thin (IDDSI 0)  Medication administration: As best tolerated    Compensatory swallow strategies:  - Upright positioning for PO intake  - Slow rate of intake  - As able, attempt larger bolus size with continued slow rate, one strong swallow per bolus, and alternate solids with liquids as needed    Recommended frequency/duration:  Skilled SLP services recommended: No  Discharge recommendation: Home with no further SLP         SUBJECTIVE    PMHx relevant to rehab: POTS, Lupus,  GERD, Subclinical Hyperthyroidism, Epigastric pain, bilious vomiting of unknown origin    Chief complaint: Pt was admitted on 25 due to   Chief Complaint   Patient presents with    Abdominal Pain    Nausea       Relevant imaging results:  CT abdomen/pelvis 25: IMPRESSION:  1.  Limited assessment of stomach due to underdistention, however  there is some possible mild distal gastric wall thickening, could be  due to gastritis although nonspecific.  2. Incidental 1 cm left hepatic lesion has features which are  suggestive of hemangioma. No evidence  of underlying liver disease.  3. Mildly prominent dense colonic stool, likely containing previously  ingested radiopaque medication (such as Pepto-Bismol). Correlate  clinically. No obstruction or definite acute process of the bowel.    General Visit Information:  SLP Received On: 07/28/25  Patient Class: Inpatient  Living Environment: Home  Ordering Physician: Dr. Lugo  Reason for Referral: Swallow evaluation; globus sensation  Prior to Session Communication: Bedside nurse    Pt reported hx of GERD, with symptoms worsening at times in correlation with her POTS symptoms.    Date of Onset: 07/27/25  Date of Order: 07/28/25  [employer] Diet: Regular/thin  Current Diet : Clear liquid    Status at time of evaluation:  Pain Assessment  Pain Assessment: 0-10  0-10 (Numeric) Pain Score:  (did not rate)  Pain Type: Acute pain  Pain Location: Abdomen    Pt was alert, pleasant, cooperative, and engaged for session.  Orientation: Ox4  Ability to follow functional commands: WFL  Nutritional status: Decreased appetite (acute)    Respiratory status: Room air  Baseline Vocal Quality: Normal  Volitional Cough: Strong  Volitional Swallow: Within Functional Limits  Patient positioning: Upright in bed      OBJECTIVE  Clinical swallow evaluation completed and consisted of interview, oral motor assessment, and limited PO trials (jello x4 small bites, thin water x4 sips).    ORAL PHASE: Natural dentition in good condition. Oral mucosa were pink, moist, and free of obvious lesions. Lingual strength and ROM were WFL bilaterally. A/P transit and oral clearance were prompt and complete.    PHARYNGEAL PHASE: Laryngeal elevation was visualized or palpated with all trials, however adequacy of hyolaryngeal elevation/excursion cannot be determined at bedside. No immediate or delayed s/sx aspiration/penetration were observed with any consistencies. Pt took very small boluses and alternated jello with water, which she reports mostly prevents globus  sensation.    Was 3oz challenge administered: Unable to complete due to pt reporting large quantities trigger nausea/vomiting.      Treatment/Education:  Results and recommendations were relayed to: Patient and Bedside nurse  Education provided: Yes   Learner: Patient   Barriers to learning: None   Method of teaching: Verbal  Topic: results of assessment and recommended swallow strategies   Outcome of teaching: Pt/family demonstrated good understanding

## 2025-07-29 NOTE — PROGRESS NOTES
Essence Almodovar is a 25 y.o. female on day 1 of admission presenting with Intractable nausea and vomiting.    Subjective   Pt says vomiting has improved. She continues to be nauseous but the meds are helping.        Objective     Physical Exam  Vitals and nursing note reviewed.   Constitutional:       General: She is not in acute distress.     Appearance: Normal appearance. She is not ill-appearing or toxic-appearing.   HENT:      Head: Normocephalic and atraumatic.      Nose: Nose normal.      Mouth/Throat:      Mouth: Mucous membranes are moist.     Eyes:      Extraocular Movements: Extraocular movements intact.      Conjunctiva/sclera: Conjunctivae normal.      Pupils: Pupils are equal, round, and reactive to light.       Cardiovascular:      Rate and Rhythm: Normal rate and regular rhythm.      Heart sounds: No murmur heard.     No gallop.   Pulmonary:      Effort: Pulmonary effort is normal. No respiratory distress.      Breath sounds: Normal breath sounds. No wheezing, rhonchi or rales.   Abdominal:      General: Abdomen is flat. Bowel sounds are normal. There is no distension.      Palpations: Abdomen is soft. There is no mass.      Tenderness: There is no abdominal tenderness.     Musculoskeletal:         General: No swelling or tenderness. Normal range of motion.      Cervical back: Normal range of motion and neck supple.     Skin:     General: Skin is warm and dry.     Neurological:      General: No focal deficit present.      Mental Status: She is alert and oriented to person, place, and time. Mental status is at baseline.     Psychiatric:         Mood and Affect: Mood normal.         Behavior: Behavior normal.         Thought Content: Thought content normal.         Judgment: Judgment normal.         Last Recorded Vitals:  /70 (BP Location: Left arm, Patient Position: Lying)   Pulse 74   Temp 36.8 °C (98.2 °F) (Temporal)   Resp 18   Ht (!) 1.524 m (5')   Wt 50 kg (110 lb 3.2 oz)   SpO2 99%    BMI 21.52 kg/m²      Scheduled medications:  Scheduled Medications[1]  Continuous medications:  Continuous Medications[2]  PRN medications:  PRN Medications[3]     Relevant Results:  Results for orders placed or performed during the hospital encounter of 07/27/25 (from the past 24 hours)   POCT GLUCOSE   Result Value Ref Range    POCT Glucose 113 (H) 74 - 99 mg/dL       Imaging  CT abdomen pelvis w IV contrast  Result Date: 7/27/2025  1.  Limited assessment of stomach due to underdistention, however there is some possible mild distal gastric wall thickening, could be due to gastritis although nonspecific. 2. Incidental 1 cm left hepatic lesion has features which are suggestive of hemangioma. No evidence of underlying liver disease. 3. Mildly prominent dense colonic stool, likely containing previously ingested radiopaque medication (such as Pepto-Bismol). Correlate clinically. No obstruction or definite acute process of the bowel.       Signed by: Maral Art 7/27/2025 4:11 PM Dictation workstation:   [id number]    US right upper quadrant  Result Date: 7/27/2025  Essentially unremarkable exam without definite gallstones.   MACRO: None   Signed by: Usha Wilson 7/27/2025 2:13 PM Dictation workstation:   [id number]      Cardiology, Vascular, and Other Imaging  ECG 12 lead  Result Date: 7/28/2025  Normal sinus rhythm ST & T wave abnormality, consider anterior ischemia Abnormal ECG When compared with ECG of 25-JUL-2025 14:08, (unconfirmed) No significant change was found                       Assessment/Plan   Assessment & Plan  Intractable nausea and vomiting    Epigastric pain    Essence Almodovar is a 25 y.o. female presented with Intractable nausea and vomiting as the principal problem.     Intractable nausea and vomiting, unknown cause, could be side effect of plaquenil vs Gastritis   Mildly elevated lipase 106->149  - CTAP 7/27 some possible mild distal gastric wall thickening, could be due to gastritis although  nonspecific.  Incidental 1 cm left hepatic lesion has features which are suggestive of hemangioma. No evidence of underlying liver disease.  - Patient had EGD on 5/30 - Erosive gastropathy with no bleeding and no stigmata of recent bleeding. Biopsied. Patient was recommended 6 weeks of Prilosec  - IVF  - Protonix 40mg BID  - zofran PRN  - reglan PRN  -Trial benadryl 25mg IV x1 made her nausea feel worse  -Provided 1x decadron 4mg IV   -Unable to tolerate compazine  - CLD, ADAT  - hold plaquenil  - GI consult  - pt did have a pt had a virtual visit with her [facility] (Felisha Liriano PA-C) and she recommended that the pt start carafate 1g QID, omperazole 40mg BID, Coenyzme q10 200mg BID, riboflavin 200mg BID and amitriptyline 25mg at bedtime. also colonoscopy and HIDA outpatient or inpatient  - will trial meds suggested except for coenyzme q as we don't carry med     N/V could also be from Eating disorder  Hx of Anorexia Nervosa  Hx of Visual and Auditory Hallucinations  - Consult place for Psychiatry  - Did speak to Patient's PCP, according to her Patient's psychiatric problems which are almost certainly contributing to her physical   symptoms. She has previously admitted to visual and auditory hallucinations. She has had extensive testing by multiple medical specialists, but likely did not had a thorough psych evaluation  - psych suggested to hold plaquenil to see if it helps with pt's symptoms     Constipation  -Has been constipated for the past few days and she attributes it to poor po intake   - bowel regimen     Hypokalemia  resolved     Hypoglycemia  resolved  - now on D5NS     Normocytic anemia  - monitor    UTI is ruled out by UCx  -dc ceftriaxone     POTS  Lupus   -Resumed home Metop at 12.5mg due to low BP  - hold plaquenil     DVT ppx: SCD  Dispo: monitor clinically         Mita Reyes MD  Hospitalist         [1] famotidine, 20 mg, intravenous, q12h JESUS  [Held by provider] hydroxychloroquine,  200 mg, oral, Daily  magnesium oxide, 200 mg of magnesium oxide, oral, Nightly  metoprolol succinate XL, 12.5 mg, oral, Daily  pantoprazole, 40 mg, intravenous, BID  [2]    [3] PRN medications: acetaminophen, melatonin, metoclopramide, ondansetron, sennosides-docusate sodium

## 2025-07-29 NOTE — SIGNIFICANT EVENT
Iatrogenic pos tox for opioids - given morphine in ED several hours prior to urine specimen.   [place] negative for any controlled substance.     Review of pt meds  Plaquenil common side effects are nausea/vomiting/abd pain.   Please trial hold or dose reduction.

## 2025-07-29 NOTE — CARE PLAN
The patient's goals for the shift include to sleep    The clinical goals for the shift include Pt nausea will be controlled during shift      Problem: Pain - Adult  Goal: Verbalizes/displays adequate comfort level or baseline comfort level  Outcome: Progressing     Problem: Safety - Adult  Goal: Free from fall injury  Outcome: Progressing     Problem: Discharge Planning  Goal: Discharge to home or other facility with appropriate resources  Outcome: Progressing     Problem: Chronic Conditions and Co-morbidities  Goal: Patient's chronic conditions and co-morbidity symptoms are monitored and maintained or improved  Outcome: Progressing     Problem: Nutrition  Goal: Nutrient intake appropriate for maintaining nutritional needs  Outcome: Progressing     Problem: Safety - Adult  Goal: Free from fall injury  Outcome: Progressing     Problem: Discharge Planning  Goal: Discharge to home or other facility with appropriate resources  Outcome: Progressing     Problem: Chronic Conditions and Co-morbidities  Goal: Patient's chronic conditions and co-morbidity symptoms are monitored and maintained or improved  Outcome: Progressing     Problem: Nutrition  Goal: Nutrient intake appropriate for maintaining nutritional needs  Outcome: Progressing

## 2025-07-29 NOTE — CARE PLAN
The patient's goals for the shift include to sleep    The clinical goals for the shift include remain nausea less frequently    Over the shift, the patient did make progress toward the following goals.     Problem: Nutrition  Goal: Nutrient intake appropriate for maintaining nutritional needs  Outcome: Progressing     Problem: Safety - Adult  Goal: Free from fall injury  Outcome: Progressing

## 2025-07-30 LAB
ALBUMIN SERPL BCP-MCNC: 3.8 G/DL (ref 3.4–5)
ALP SERPL-CCNC: 33 U/L (ref 33–110)
ALT SERPL W P-5'-P-CCNC: 6 U/L (ref 7–45)
ANION GAP SERPL CALC-SCNC: 11 MMOL/L (ref 10–20)
AST SERPL W P-5'-P-CCNC: 14 U/L (ref 9–39)
ATRIAL RATE: 68 BPM
ATRIAL RATE: 92 BPM
BASOPHILS # BLD AUTO: 0.04 X10*3/UL (ref 0–0.1)
BASOPHILS NFR BLD AUTO: 0.5 %
BILIRUB SERPL-MCNC: 0.4 MG/DL (ref 0–1.2)
BUN SERPL-MCNC: 7 MG/DL (ref 6–23)
CALCIUM SERPL-MCNC: 8.5 MG/DL (ref 8.6–10.3)
CHLORIDE SERPL-SCNC: 104 MMOL/L (ref 98–107)
CO2 SERPL-SCNC: 24 MMOL/L (ref 21–32)
CREAT SERPL-MCNC: 0.68 MG/DL (ref 0.5–1.05)
EGFRCR SERPLBLD CKD-EPI 2021: >90 ML/MIN/1.73M*2
EOSINOPHIL # BLD AUTO: 0.11 X10*3/UL (ref 0–0.7)
EOSINOPHIL NFR BLD AUTO: 1.3 %
ERYTHROCYTE [DISTWIDTH] IN BLOOD BY AUTOMATED COUNT: 11.7 % (ref 11.5–14.5)
GLUCOSE SERPL-MCNC: 63 MG/DL (ref 74–99)
HCT VFR BLD AUTO: 30.1 % (ref 36–46)
HGB BLD-MCNC: 10.6 G/DL (ref 12–16)
IMM GRANULOCYTES # BLD AUTO: 0.03 X10*3/UL (ref 0–0.7)
IMM GRANULOCYTES NFR BLD AUTO: 0.4 % (ref 0–0.9)
LYMPHOCYTES # BLD AUTO: 3.9 X10*3/UL (ref 1.2–4.8)
LYMPHOCYTES NFR BLD AUTO: 46.2 %
MCH RBC QN AUTO: 30.2 PG (ref 26–34)
MCHC RBC AUTO-ENTMCNC: 35.2 G/DL (ref 32–36)
MCV RBC AUTO: 86 FL (ref 80–100)
MONOCYTES # BLD AUTO: 0.65 X10*3/UL (ref 0.1–1)
MONOCYTES NFR BLD AUTO: 7.7 %
NEUTROPHILS # BLD AUTO: 3.72 X10*3/UL (ref 1.2–7.7)
NEUTROPHILS NFR BLD AUTO: 43.9 %
NRBC BLD-RTO: 0 /100 WBCS (ref 0–0)
P AXIS: 66 DEGREES
P AXIS: 77 DEGREES
P OFFSET: 200 MS
P OFFSET: 203 MS
P ONSET: 145 MS
P ONSET: 154 MS
PLATELET # BLD AUTO: 301 X10*3/UL (ref 150–450)
POTASSIUM SERPL-SCNC: 3.3 MMOL/L (ref 3.5–5.3)
PR INTERVAL: 130 MS
PR INTERVAL: 146 MS
PROT SERPL-MCNC: 6.4 G/DL (ref 6.4–8.2)
Q ONSET: 218 MS
Q ONSET: 219 MS
QRS COUNT: 12 BEATS
QRS COUNT: 15 BEATS
QRS DURATION: 80 MS
QRS DURATION: 84 MS
QT INTERVAL: 362 MS
QT INTERVAL: 400 MS
QTC CALCULATION(BAZETT): 425 MS
QTC CALCULATION(BAZETT): 447 MS
QTC FREDERICIA: 417 MS
QTC FREDERICIA: 417 MS
R AXIS: 82 DEGREES
R AXIS: 84 DEGREES
RBC # BLD AUTO: 3.51 X10*6/UL (ref 4–5.2)
SODIUM SERPL-SCNC: 136 MMOL/L (ref 136–145)
T AXIS: 66 DEGREES
T AXIS: 70 DEGREES
T OFFSET: 400 MS
T OFFSET: 418 MS
VENTRICULAR RATE: 68 BPM
VENTRICULAR RATE: 92 BPM
WBC # BLD AUTO: 8.5 X10*3/UL (ref 4.4–11.3)

## 2025-07-30 PROCEDURE — 99232 SBSQ HOSP IP/OBS MODERATE 35: CPT | Performed by: INTERNAL MEDICINE

## 2025-07-30 PROCEDURE — 2500000001 HC RX 250 WO HCPCS SELF ADMINISTERED DRUGS (ALT 637 FOR MEDICARE OP): Performed by: INTERNAL MEDICINE

## 2025-07-30 PROCEDURE — 2500000001 HC RX 250 WO HCPCS SELF ADMINISTERED DRUGS (ALT 637 FOR MEDICARE OP): Performed by: STUDENT IN AN ORGANIZED HEALTH CARE EDUCATION/TRAINING PROGRAM

## 2025-07-30 PROCEDURE — 2500000004 HC RX 250 GENERAL PHARMACY W/ HCPCS (ALT 636 FOR OP/ED): Performed by: STUDENT IN AN ORGANIZED HEALTH CARE EDUCATION/TRAINING PROGRAM

## 2025-07-30 PROCEDURE — 36415 COLL VENOUS BLD VENIPUNCTURE: CPT | Performed by: STUDENT IN AN ORGANIZED HEALTH CARE EDUCATION/TRAINING PROGRAM

## 2025-07-30 PROCEDURE — 2500000004 HC RX 250 GENERAL PHARMACY W/ HCPCS (ALT 636 FOR OP/ED): Performed by: INTERNAL MEDICINE

## 2025-07-30 PROCEDURE — 2500000004 HC RX 250 GENERAL PHARMACY W/ HCPCS (ALT 636 FOR OP/ED): Performed by: NURSE PRACTITIONER

## 2025-07-30 PROCEDURE — 99222 1ST HOSP IP/OBS MODERATE 55: CPT | Performed by: INTERNAL MEDICINE

## 2025-07-30 PROCEDURE — 85025 COMPLETE CBC W/AUTO DIFF WBC: CPT | Performed by: STUDENT IN AN ORGANIZED HEALTH CARE EDUCATION/TRAINING PROGRAM

## 2025-07-30 PROCEDURE — 1200000002 HC GENERAL ROOM WITH TELEMETRY DAILY

## 2025-07-30 PROCEDURE — 80053 COMPREHEN METABOLIC PANEL: CPT | Performed by: STUDENT IN AN ORGANIZED HEALTH CARE EDUCATION/TRAINING PROGRAM

## 2025-07-30 RX ORDER — POTASSIUM CHLORIDE 1.5 G/1.58G
20 POWDER, FOR SOLUTION ORAL ONCE
Status: COMPLETED | OUTPATIENT
Start: 2025-07-30 | End: 2025-07-30

## 2025-07-30 RX ADMIN — METOPROLOL SUCCINATE 12.5 MG: 25 TABLET, EXTENDED RELEASE ORAL at 09:10

## 2025-07-30 RX ADMIN — ONDANSETRON 4 MG: 2 INJECTION, SOLUTION INTRAMUSCULAR; INTRAVENOUS at 20:29

## 2025-07-30 RX ADMIN — PANTOPRAZOLE SODIUM 40 MG: 40 INJECTION, POWDER, FOR SOLUTION INTRAVENOUS at 08:17

## 2025-07-30 RX ADMIN — FAMOTIDINE 20 MG: 10 INJECTION, SOLUTION INTRAVENOUS at 20:29

## 2025-07-30 RX ADMIN — Medication 0.5 TABLET: at 22:01

## 2025-07-30 RX ADMIN — SUCRALFATE 1 G: 1 TABLET ORAL at 06:23

## 2025-07-30 RX ADMIN — METOCLOPRAMIDE 10 MG: 5 INJECTION, SOLUTION INTRAMUSCULAR; INTRAVENOUS at 16:56

## 2025-07-30 RX ADMIN — SUCRALFATE 1 G: 1 TABLET ORAL at 20:29

## 2025-07-30 RX ADMIN — FAMOTIDINE 20 MG: 10 INJECTION, SOLUTION INTRAVENOUS at 09:29

## 2025-07-30 RX ADMIN — ONDANSETRON 4 MG: 2 INJECTION, SOLUTION INTRAMUSCULAR; INTRAVENOUS at 08:22

## 2025-07-30 RX ADMIN — Medication 200 MG: at 09:27

## 2025-07-30 RX ADMIN — ONDANSETRON 4 MG: 2 INJECTION, SOLUTION INTRAMUSCULAR; INTRAVENOUS at 00:38

## 2025-07-30 RX ADMIN — PANTOPRAZOLE SODIUM 40 MG: 40 INJECTION, POWDER, FOR SOLUTION INTRAVENOUS at 20:29

## 2025-07-30 RX ADMIN — POTASSIUM CHLORIDE 20 MEQ: 1.5 POWDER, FOR SOLUTION ORAL at 09:10

## 2025-07-30 RX ADMIN — SUCRALFATE 1 G: 1 TABLET ORAL at 12:11

## 2025-07-30 RX ADMIN — AMITRIPTYLINE HYDROCHLORIDE 25 MG: 25 TABLET, FILM COATED ORAL at 22:01

## 2025-07-30 RX ADMIN — Medication 200 MG: at 22:02

## 2025-07-30 RX ADMIN — ONDANSETRON 4 MG: 2 INJECTION, SOLUTION INTRAMUSCULAR; INTRAVENOUS at 14:48

## 2025-07-30 ASSESSMENT — PAIN SCALES - GENERAL
PAINLEVEL_OUTOF10: 0 - NO PAIN
PAINLEVEL_OUTOF10: 0 - NO PAIN

## 2025-07-30 ASSESSMENT — COGNITIVE AND FUNCTIONAL STATUS - GENERAL
MOBILITY SCORE: 24
DAILY ACTIVITIY SCORE: 24

## 2025-07-30 ASSESSMENT — PAIN - FUNCTIONAL ASSESSMENT
PAIN_FUNCTIONAL_ASSESSMENT: 0-10
PAIN_FUNCTIONAL_ASSESSMENT: 0-10

## 2025-07-30 NOTE — CONSULTS
Reason For Consult  Likely eating disorder per Primary. Chronic N/V abdominal pain    History Of Present Illness  Essence Almodovar is a 25 y.o. female, SLE/POTS/EDS, pt representative in hospital, h.o. ADHD and possibly eating disorder, admitted 7/27 after discharge one day earlier for recurrent N/V/abd pain that has been recurring for months.    Pt diagnosed with lupus last year after seroconverting, however symptoms have been going on for several years prior.   Gastrointestinal and hypermobility/joint symptoms documented since college, including bloody diarrhea.   Pt has intermittently been on NSAIDS for pain, steroids and is now on hydroxychloroquine x at least 2 years.   Followed by Rheum and GI outpt x years.   Documentation of nausea/abd pain without vomiting-  worse with eating - since at least 2020.    Urine tox IATROGENIC POS OPIOIDS - administered morphine several hours prior to specimen.   OARRS no meds x 2 yrs.      Past Medical History  She has a past medical history of ADHD (09/02/2020), ADHD (attention deficit hyperactivity disorder), Allergic, Eating disorder, Eczema, Headache, Irregular heart beat (09/02/2020), Scoliosis, and Visual impairment.    Surgical History  She has a past surgical history that includes [place] tooth extraction.     Social History  She reports that she has never smoked. She has never used smokeless tobacco. She reports that she does not currently use alcohol. She reports that she does not use drugs.    Family History  Crohns  Family History[1]     Allergies  Pineapple, Amoxicillin, Azithromycin, and Compazine [prochlorperazine]     Physical Exam  Physical Exam     Last Recorded Vitals  Blood pressure 137/80, pulse 81, temperature 36.1 °C (97 °F), temperature source Temporal, resp. rate 18, height (!) 1.524 m (5'), weight 50 kg (110 lb 3.2 oz), SpO2 98%.    Relevant Results  Scheduled medications  amitriptyline, 25 mg, oral, Nightly  famotidine, 20 mg, intravenous, q12h  JESUS  [Held by provider] hydroxychloroquine, 200 mg, oral, Daily  magnesium oxide, 200 mg of magnesium oxide, oral, Nightly  metoprolol succinate XL, 12.5 mg, oral, Daily  pantoprazole, 40 mg, intravenous, BID  riboflavin, 200 mg, oral, BID  sucralfate, 1 g, oral, Before meals & nightly  Continuous medications  Continuous Medications[2]  PRN medications  PRN Medications[3]     Results for orders placed or performed during the hospital encounter of 07/27/25 (from the past 96 hours)   hCG, Urine, Qualitative   Result Value Ref Range    HCG, Urine NEGATIVE NEGATIVE   CBC and Auto Differential   Result Value Ref Range    WBC 8.0 4.4 - 11.3 x10*3/uL    nRBC 0.0 0.0 - 0.0 /100 WBCs    RBC 4.04 4.00 - 5.20 x10*6/uL    Hemoglobin 12.4 12.0 - 16.0 g/dL    Hematocrit 36.2 36.0 - 46.0 %    MCV 90 80 - 100 fL    MCH 30.7 26.0 - 34.0 pg    MCHC 34.3 32.0 - 36.0 g/dL    RDW 11.6 11.5 - 14.5 %    Platelets 318 150 - 450 x10*3/uL    Neutrophils % 69.3 40.0 - 80.0 %    Immature Granulocytes %, Automated 0.3 0.0 - 0.9 %    Lymphocytes % 23.7 13.0 - 44.0 %    Monocytes % 5.8 2.0 - 10.0 %    Eosinophils % 0.1 0.0 - 6.0 %    Basophils % 0.8 0.0 - 2.0 %    Neutrophils Absolute 5.54 1.20 - 7.70 x10*3/uL    Immature Granulocytes Absolute, Automated 0.02 0.00 - 0.70 x10*3/uL    Lymphocytes Absolute 1.89 1.20 - 4.80 x10*3/uL    Monocytes Absolute 0.46 0.10 - 1.00 x10*3/uL    Eosinophils Absolute 0.01 0.00 - 0.70 x10*3/uL    Basophils Absolute 0.06 0.00 - 0.10 x10*3/uL   Magnesium   Result Value Ref Range    Magnesium 2.15 1.60 - 2.40 mg/dL   Phosphorus   Result Value Ref Range    Phosphorus 3.9 2.5 - 4.9 mg/dL   Comprehensive metabolic panel   Result Value Ref Range    Glucose 63 (L) 74 - 99 mg/dL    Sodium 136 136 - 145 mmol/L    Potassium 3.3 (L) 3.5 - 5.3 mmol/L    Chloride 101 98 - 107 mmol/L    Bicarbonate 18 (L) 21 - 32 mmol/L    Anion Gap 20 10 - 20 mmol/L    Urea Nitrogen 11 6 - 23 mg/dL    Creatinine 0.71 0.50 - 1.05 mg/dL    eGFR  >90 >60 mL/min/1.73m*2    Calcium 9.2 8.6 - 10.3 mg/dL    Albumin 4.8 3.4 - 5.0 g/dL    Alkaline Phosphatase 44 33 - 110 U/L    Total Protein 7.9 6.4 - 8.2 g/dL    AST 19 9 - 39 U/L    Bilirubin, Total 0.6 0.0 - 1.2 mg/dL    ALT 9 7 - 45 U/L   Lactate   Result Value Ref Range    Lactate 1.1 0.4 - 2.0 mmol/L   Troponin I, High Sensitivity, Initial   Result Value Ref Range    Troponin I, High Sensitivity <3 0 - 13 ng/L   Urinalysis with Reflex Culture and Microscopic   Result Value Ref Range    Color, Urine Yellow Light-Yellow, Yellow, Dark-Yellow    Appearance, Urine Turbid (N) Clear    Specific Gravity, Urine 1.030 1.005 - 1.035    pH, Urine 5.5 5.0, 5.5, 6.0, 6.5, 7.0, 7.5, 8.0    Protein, Urine 70 (1+) (A) NEGATIVE, 10 (TRACE), 20 (TRACE) mg/dL    Glucose, Urine Normal Normal mg/dL    Blood, Urine 1.0 (3+) (A) NEGATIVE mg/dL    Ketones, Urine OVER (4+) (A) NEGATIVE mg/dL    Bilirubin, Urine NEGATIVE NEGATIVE mg/dL    Urobilinogen, Urine Normal Normal mg/dL    Nitrite, Urine NEGATIVE NEGATIVE    Leukocyte Esterase, Urine 250 Zach/uL (A) NEGATIVE   Microscopic Only, Urine   Result Value Ref Range    WBC, Urine 11-20 (A) 1-5, NONE /HPF    RBC, Urine 11-20 (A) NONE, 1-2, 3-5 /HPF    Squamous Epithelial Cells, Urine 1-9 (SPARSE) Reference range not established. /HPF    Bacteria, Urine 1+ (A) NONE SEEN /HPF    Mucus, Urine 1+ Reference range not established. /LPF    Amorphous Crystals, Urine 1+ NONE, 1+, 2+ /HPF   Urine Culture    Specimen: Clean Catch/Voided; Urine   Result Value Ref Range    Urine Culture       Clinically insignificant growth based on current clinical standards.   ECG 12 lead   Result Value Ref Range    Ventricular Rate 92 BPM    Atrial Rate 92 BPM    NM Interval 130 ms    QRS Duration 84 ms    QT Interval 362 ms    QTC Calculation(Bazett) 447 ms    P Axis 77 degrees    R Axis 82 degrees    T Axis 66 degrees    QRS Count 15 beats    Q Onset 219 ms    P Onset 154 ms    P Offset 203 ms    T Offset 400 ms     QTC Fredericia 417 ms   Troponin, High Sensitivity, 1 Hour   Result Value Ref Range    Troponin I, High Sensitivity <3 0 - 13 ng/L   Lipase   Result Value Ref Range    Lipase 106 (H) 9 - 82 U/L   TSH   Result Value Ref Range    Thyroid Stimulating Hormone 1.22 0.44 - 3.98 mIU/L   Drug Screen, Urine   Result Value Ref Range    Amphetamine Screen, Urine Presumptive Negative Presumptive Negative    Barbiturate Screen, Urine Presumptive Negative Presumptive Negative    Benzodiazepines Screen, Urine Presumptive Negative Presumptive Negative    Cannabinoid Screen, Urine Presumptive Negative Presumptive Negative    Cocaine Metabolite Screen, Urine Presumptive Negative Presumptive Negative    Fentanyl Screen, Urine Presumptive Negative Presumptive Negative    Opiate Screen, Urine Presumptive Positive (A) Presumptive Negative    Oxycodone Screen, Urine Presumptive Negative Presumptive Negative    PCP Screen, Urine Presumptive Negative Presumptive Negative    Methadone Screen, Urine Presumptive Negative Presumptive Negative   POCT GLUCOSE   Result Value Ref Range    POCT Glucose 113 (H) 74 - 99 mg/dL   Lipase   Result Value Ref Range    Lipase 149 (H) 9 - 82 U/L   Comprehensive metabolic panel   Result Value Ref Range    Glucose 163 (H) 74 - 99 mg/dL    Sodium 133 (L) 136 - 145 mmol/L    Potassium 4.6 3.5 - 5.3 mmol/L    Chloride 104 98 - 107 mmol/L    Bicarbonate 21 21 - 32 mmol/L    Anion Gap 13 10 - 20 mmol/L    Urea Nitrogen 5 (L) 6 - 23 mg/dL    Creatinine 0.65 0.50 - 1.05 mg/dL    eGFR >90 >60 mL/min/1.73m*2    Calcium 8.9 8.6 - 10.3 mg/dL    Albumin 4.1 3.4 - 5.0 g/dL    Alkaline Phosphatase 38 33 - 110 U/L    Total Protein 6.9 6.4 - 8.2 g/dL    AST 16 9 - 39 U/L    Bilirubin, Total 0.4 0.0 - 1.2 mg/dL    ALT 7 7 - 45 U/L   CBC and Auto Differential   Result Value Ref Range    WBC 7.8 4.4 - 11.3 x10*3/uL    nRBC 0.0 0.0 - 0.0 /100 WBCs    RBC 3.79 (L) 4.00 - 5.20 x10*6/uL    Hemoglobin 11.5 (L) 12.0 - 16.0 g/dL     Hematocrit 32.8 (L) 36.0 - 46.0 %    MCV 87 80 - 100 fL    MCH 30.3 26.0 - 34.0 pg    MCHC 35.1 32.0 - 36.0 g/dL    RDW 11.6 11.5 - 14.5 %    Platelets 319 150 - 450 x10*3/uL    Neutrophils % 85.1 40.0 - 80.0 %    Immature Granulocytes %, Automated 0.5 0.0 - 0.9 %    Lymphocytes % 12.9 13.0 - 44.0 %    Monocytes % 1.2 2.0 - 10.0 %    Eosinophils % 0.0 0.0 - 6.0 %    Basophils % 0.3 0.0 - 2.0 %    Neutrophils Absolute 6.63 1.20 - 7.70 x10*3/uL    Immature Granulocytes Absolute, Automated 0.04 0.00 - 0.70 x10*3/uL    Lymphocytes Absolute 1.00 (L) 1.20 - 4.80 x10*3/uL    Monocytes Absolute 0.09 (L) 0.10 - 1.00 x10*3/uL    Eosinophils Absolute 0.00 0.00 - 0.70 x10*3/uL    Basophils Absolute 0.02 0.00 - 0.10 x10*3/uL   Magnesium   Result Value Ref Range    Magnesium 1.99 1.60 - 2.40 mg/dL   POCT GLUCOSE   Result Value Ref Range    POCT Glucose 157 (H) 74 - 99 mg/dL   POCT GLUCOSE   Result Value Ref Range    POCT Glucose 157 (H) 74 - 99 mg/dL   POCT GLUCOSE   Result Value Ref Range    POCT Glucose 141 (H) 74 - 99 mg/dL   POCT GLUCOSE   Result Value Ref Range    POCT Glucose 113 (H) 74 - 99 mg/dL   CBC and Auto Differential   Result Value Ref Range    WBC 8.5 4.4 - 11.3 x10*3/uL    nRBC 0.0 0.0 - 0.0 /100 WBCs    RBC 3.51 (L) 4.00 - 5.20 x10*6/uL    Hemoglobin 10.6 (L) 12.0 - 16.0 g/dL    Hematocrit 30.1 (L) 36.0 - 46.0 %    MCV 86 80 - 100 fL    MCH 30.2 26.0 - 34.0 pg    MCHC 35.2 32.0 - 36.0 g/dL    RDW 11.7 11.5 - 14.5 %    Platelets 301 150 - 450 x10*3/uL    Neutrophils % 43.9 40.0 - 80.0 %    Immature Granulocytes %, Automated 0.4 0.0 - 0.9 %    Lymphocytes % 46.2 13.0 - 44.0 %    Monocytes % 7.7 2.0 - 10.0 %    Eosinophils % 1.3 0.0 - 6.0 %    Basophils % 0.5 0.0 - 2.0 %    Neutrophils Absolute 3.72 1.20 - 7.70 x10*3/uL    Immature Granulocytes Absolute, Automated 0.03 0.00 - 0.70 x10*3/uL    Lymphocytes Absolute 3.90 1.20 - 4.80 x10*3/uL    Monocytes Absolute 0.65 0.10 - 1.00 x10*3/uL    Eosinophils Absolute 0.11  0.00 - 0.70 x10*3/uL    Basophils Absolute 0.04 0.00 - 0.10 x10*3/uL   Comprehensive Metabolic Panel   Result Value Ref Range    Glucose 63 (L) 74 - 99 mg/dL    Sodium 136 136 - 145 mmol/L    Potassium 3.3 (L) 3.5 - 5.3 mmol/L    Chloride 104 98 - 107 mmol/L    Bicarbonate 24 21 - 32 mmol/L    Anion Gap 11 10 - 20 mmol/L    Urea Nitrogen 7 6 - 23 mg/dL    Creatinine 0.68 0.50 - 1.05 mg/dL    eGFR >90 >60 mL/min/1.73m*2    Calcium 8.5 (L) 8.6 - 10.3 mg/dL    Albumin 3.8 3.4 - 5.0 g/dL    Alkaline Phosphatase 33 33 - 110 U/L    Total Protein 6.4 6.4 - 8.2 g/dL    AST 14 9 - 39 U/L    Bilirubin, Total 0.4 0.0 - 1.2 mg/dL    ALT 6 (L) 7 - 45 U/L   CBC and Auto Differential   Result Value Ref Range    WBC 6.3 4.4 - 11.3 x10*3/uL    nRBC 0.0 0.0 - 0.0 /100 WBCs    RBC 3.85 (L) 4.00 - 5.20 x10*6/uL    Hemoglobin 11.6 (L) 12.0 - 16.0 g/dL    Hematocrit 33.1 (L) 36.0 - 46.0 %    MCV 86 80 - 100 fL    MCH 30.1 26.0 - 34.0 pg    MCHC 35.0 32.0 - 36.0 g/dL    RDW 11.6 11.5 - 14.5 %    Platelets 322 150 - 450 x10*3/uL    Neutrophils % 43.2 40.0 - 80.0 %    Immature Granulocytes %, Automated 0.3 0.0 - 0.9 %    Lymphocytes % 46.3 13.0 - 44.0 %    Monocytes % 7.5 2.0 - 10.0 %    Eosinophils % 2.1 0.0 - 6.0 %    Basophils % 0.6 0.0 - 2.0 %    Neutrophils Absolute 2.71 1.20 - 7.70 x10*3/uL    Immature Granulocytes Absolute, Automated 0.02 0.00 - 0.70 x10*3/uL    Lymphocytes Absolute 2.91 1.20 - 4.80 x10*3/uL    Monocytes Absolute 0.47 0.10 - 1.00 x10*3/uL    Eosinophils Absolute 0.13 0.00 - 0.70 x10*3/uL    Basophils Absolute 0.04 0.00 - 0.10 x10*3/uL          Assessment/Plan     IMP:  No evidence of eating disorder or body dysmorphia currently  No evidence of schizophreniform or mood disorder at this time  Hallucinations most likely secondary to brain inflammation  Erosive gastropathy may be secondary to intractable N/V which may be caused by taking medication on empty stomach. Vomiting appears to have started with plaquenil,  "however N/pain have been reported and worked up x years.   Lupus, POTS, Hypermobility syndrome    PLAN:  Add on B12  Low dose thiamine replacement (not ETOH)  Currently on amitriptyline 25 mg hs but has POTS and this will make her orthostatic instability worse.   Can use antihistamine/allergy pill instead which may help  \"The terminal elimination half-life of Plaquenil (hydroxychloroquine) is more than 40 days, typically reported as 40 to 50 days.   This long half-life is due to the drug's extensive tissue uptake and accumulation rather than slow excretion. Studies in healthy individuals have shown even longer half-lives in plasma following a single dose, possibly up to 123.5 days. \"  Pt instructed to keep plaquenil on hold until her N/V symptoms improve - and to trial restart with a full stomach of food when she feels well enough.   Will likely need GI medication for some time based on above half life of hydroxychloroquine.  Please monitor B12, magnesium due to PPIs.     Hallucinations related to lupus may last for several months until the medication is fully effective.   Last retina exam 3/11 - will need to be repeated periodically if soft visual hallucinations continue  She will need psychiatric medication only if they worsen, awaken her from sleep, intrude in her ability to think clearly.   Pt may benefit from anti-inflammatory diet eventually.     I spent 60 minutes in the professional and overall care of this patient.      Lise Menendez MD    Virtual or Telephone Consent    An interactive audio and video telecommunication system which permits real time communications between the patient (at the originating site) and provider (at the distant site) was utilized to provide this telehealth service.   Verbal consent was requested and obtained from Essence Almodovar on this date, 07/30/25 for a telehealth visit and the patient's location was confirmed at the time of the visit.               [1]  Family History  Problem " Relation Name Age of Onset   • No Known Problems Mother     • No Known Problems Father     [2]     [3]  PRN medications: acetaminophen, melatonin, metoclopramide, ondansetron, sennosides-docusate sodium

## 2025-07-30 NOTE — PROGRESS NOTES
Essence Almodovar is a 25 y.o. female on day 2 of admission presenting with Intractable nausea and vomiting.    Subjective   Discussed with GI states do not feel it is related to Plaquenil.  Discussed with psychiatry feels that is related to Plaquenil.   Psychiatry is also concerned with retinal toxicity from the Plaquenil.  Although better today advance to full liquid diet    Objective     Physical Exam  Vitals reviewed.   Constitutional:       Appearance: Normal appearance.   HENT:      Head: Normocephalic.      Right Ear: Tympanic membrane normal.      Nose: Nose normal.      Mouth/Throat:      Mouth: Mucous membranes are moist.     Cardiovascular:      Rate and Rhythm: Normal rate and regular rhythm.   Pulmonary:      Effort: Pulmonary effort is normal.   Abdominal:      General: Abdomen is flat.     Skin:     Capillary Refill: Capillary refill takes less than 2 seconds.     Neurological:      General: No focal deficit present.      Mental Status: She is alert.         Last Recorded Vitals  Blood pressure 137/80, pulse 81, temperature 36.1 °C (97 °F), temperature source Temporal, resp. rate 18, height (!) 1.524 m (5'), weight 50 kg (110 lb 3.2 oz), SpO2 98%.  Intake/Output last 3 Shifts:  No intake/output data recorded.    Relevant Results  Results for orders placed or performed during the hospital encounter of 07/27/25 (from the past 24 hours)   CBC and Auto Differential   Result Value Ref Range    WBC 8.5 4.4 - 11.3 x10*3/uL    nRBC 0.0 0.0 - 0.0 /100 WBCs    RBC 3.51 (L) 4.00 - 5.20 x10*6/uL    Hemoglobin 10.6 (L) 12.0 - 16.0 g/dL    Hematocrit 30.1 (L) 36.0 - 46.0 %    MCV 86 80 - 100 fL    MCH 30.2 26.0 - 34.0 pg    MCHC 35.2 32.0 - 36.0 g/dL    RDW 11.7 11.5 - 14.5 %    Platelets 301 150 - 450 x10*3/uL    Neutrophils % 43.9 40.0 - 80.0 %    Immature Granulocytes %, Automated 0.4 0.0 - 0.9 %    Lymphocytes % 46.2 13.0 - 44.0 %    Monocytes % 7.7 2.0 - 10.0 %    Eosinophils % 1.3 0.0 - 6.0 %    Basophils % 0.5  0.0 - 2.0 %    Neutrophils Absolute 3.72 1.20 - 7.70 x10*3/uL    Immature Granulocytes Absolute, Automated 0.03 0.00 - 0.70 x10*3/uL    Lymphocytes Absolute 3.90 1.20 - 4.80 x10*3/uL    Monocytes Absolute 0.65 0.10 - 1.00 x10*3/uL    Eosinophils Absolute 0.11 0.00 - 0.70 x10*3/uL    Basophils Absolute 0.04 0.00 - 0.10 x10*3/uL   Comprehensive Metabolic Panel   Result Value Ref Range    Glucose 63 (L) 74 - 99 mg/dL    Sodium 136 136 - 145 mmol/L    Potassium 3.3 (L) 3.5 - 5.3 mmol/L    Chloride 104 98 - 107 mmol/L    Bicarbonate 24 21 - 32 mmol/L    Anion Gap 11 10 - 20 mmol/L    Urea Nitrogen 7 6 - 23 mg/dL    Creatinine 0.68 0.50 - 1.05 mg/dL    eGFR >90 >60 mL/min/1.73m*2    Calcium 8.5 (L) 8.6 - 10.3 mg/dL    Albumin 3.8 3.4 - 5.0 g/dL    Alkaline Phosphatase 33 33 - 110 U/L    Total Protein 6.4 6.4 - 8.2 g/dL    AST 14 9 - 39 U/L    Bilirubin, Total 0.4 0.0 - 1.2 mg/dL    ALT 6 (L) 7 - 45 U/L       Imaging Results  Ct abd/pelvis:  IMPRESSION:  1.  Limited assessment of stomach due to underdistention, however  there is some possible mild distal gastric wall thickening, could be  due to gastritis although nonspecific.  2. Incidental 1 cm left hepatic lesion has features which are  suggestive of hemangioma. No evidence of underlying liver disease.  3. Mildly prominent dense colonic stool, likely containing previously  ingested radiopaque medication (such as Pepto-Bismol). Correlate  clinically. No obstruction or definite acute process of the bowel.        Medications:  Scheduled Medications[1]   PRN Medications[2]     Assessment/Plan   1.  Intractable nausea vomiting  -psych is concerned with Plaquenil toxicity as patient also may have retinal toxicity from Plaquenil based on her symptoms.  - Seen by GI they do not feel it is from the Plaquenil board functional.  But will follow-up with psychiatry recommendations advance diet to full liquids  - Continue Protonix, riboflavin, amitriptyline, coenzyme Q at the time of  discharge    2.  History of anorexia nervosa  - stable      DVT Prophylaxis:  MAXIME Little MD  [facility]           [1] amitriptyline, 25 mg, oral, Nightly  famotidine, 20 mg, intravenous, q12h JESUS  [Held by provider] hydroxychloroquine, 200 mg, oral, Daily  magnesium oxide, 200 mg of magnesium oxide, oral, Nightly  metoprolol succinate XL, 12.5 mg, oral, Daily  pantoprazole, 40 mg, intravenous, BID  riboflavin, 200 mg, oral, BID  sucralfate, 1 g, oral, Before meals & nightly    [2] PRN medications: acetaminophen, melatonin, metoclopramide, ondansetron, sennosides-docusate sodium

## 2025-07-30 NOTE — PROGRESS NOTES
07/30/25 1343   Discharge Planning   Expected Discharge Disposition Home     Pt day 2 of admission presenting with Intractable nausea and vomiting. Holding plaquenil. Poss dc tomorrow.

## 2025-07-30 NOTE — CARE PLAN
The patient's goals for the shift include to sleep    The clinical goals for the shift include to be nauseous less frequently    Over the shift, the patient did make progress toward the following goals.     Problem: Nutrition  Goal: Nutrient intake appropriate for maintaining nutritional needs  Outcome: Progressing

## 2025-07-30 NOTE — CARE PLAN
The patient's goals for the shift include to sleep    The clinical goals for the shift include remain nausea less frequently      Problem: Safety - Adult  Goal: Free from fall injury  Outcome: Progressing     Problem: Pain - Adult  Goal: Verbalizes/displays adequate comfort level or baseline comfort level  Outcome: Progressing     Problem: Nutrition  Goal: Nutrient intake appropriate for maintaining nutritional needs  Outcome: Progressing

## 2025-07-30 NOTE — CONSULTS
"DEPARTMENT OF MEDICINE  DIVISION OF GASTROENTEROLOGY    CONSULT REASON:  Intractable N/V    HPI  Essence Almodovar is a 25-year-old female with a past medical history of lupus, anorexia nervosa, GERD, POTS who was admitted for intractable nausea.  She reports that she has had nausea for several months, initially started in April.  However a couple of weeks ago, had significantly worsening nausea, this was associated with vomiting for couple of days.  Reports that she got better after this for couple days, however the nausea returned.  Reports that she was working 7/25 and had a rapid response called on her as she was shaking very badly and had horrible nausea (she works here at [employer]).  No loss of consciousness.  No vomiting.  She was admitted 7/25-7/26 for intractable nausea and presyncopal episode. Imaging and labs from reviewed. She did improve during that hospitalization and was able to tolerate PO so was discharged home.    Today, patient reports that she has been tolerating clear liquid diet okay, however reports that any solid food has still been worsening her nausea.  She also reports associated epigastric pain that she rates as 3-4 out of 10, somewhat right-sided.  Describes it as sharp and achy in nature.  Reports that she only started noticing this on Friday/Saturday. Denies diarrhea, constipation, bloating, distention.  Reports that she is passing gas, had normal bowel movement yesterday.  Denies hematochezia, hematemesis.  Has not had vomiting since the initial episode several weeks ago.  At that time, describes her vomit as green, \"stomach acid\".  She does also report increase in burping since this has been ongoing.  She does have acid reflux, worse the last couple weeks compared to normal.    Denies any alcohol, marijuana, recreational drug use.    She does follow with GI at [facility] for the nausea and chronic constipation.  She had EGD done in May of this year.  She was supposed to have " colonoscopy at the same time however she did not tolerate the prep.    12 point review of systems negative except those listed in the HPI.         SUBJECTIVE:    ALLERGIES PAST MEDICAL HISTORY PAST SURGICAL SURGERY FAMILY HISTORY SOCIAL HISTORY   Allergies[1] Medical History[2] Surgical History[3] Family History[4] Social History[5]           MEDS:      HOME MEDS SCHEDULED MEDS PRN IV MEDS   Current Outpatient Medications   Medication Instructions    cetirizine (ZYRTEC) 10 mg, oral, Daily    fluticasone (Flonase) 50 mcg/actuation nasal spray 1 spray, Each Nostril, 2 times daily, Shake gently. Before first use, prime pump. After use, clean tip and replace cap.    hydroxychloroquine (Plaquenil) 200 mg tablet 1 tablet, Daily    magnesium glycinate 100 mg tablet 1 tablet, Nightly    metoclopramide (REGLAN) 5 mg, oral, 4 times daily    metoprolol succinate XL (TOPROL-XL) 25 mg, oral, Daily, Do not crush or chew.    ondansetron (ZOFRAN) 4 mg, oral, Every 8 hours PRN    pantoprazole (PROTONIX) 40 mg, oral, Daily, Do not crush, chew, or split.    Zafemy 150-35 mcg/24 hr Use continuously    Scheduled Medications[6] PRN Medications[7] Continuous Medications[8]               OBJECTIVE:  Physical Exam  Constitutional:       General: She is not in acute distress.     Appearance: Normal appearance.   HENT:      Head: Normocephalic and atraumatic.      Mouth/Throat:      Mouth: Mucous membranes are moist.     Eyes:      Extraocular Movements: Extraocular movements intact.      Conjunctiva/sclera: Conjunctivae normal.       Cardiovascular:      Rate and Rhythm: Normal rate and regular rhythm.      Pulses: Normal pulses.      Heart sounds: Normal heart sounds. No murmur heard.  Pulmonary:      Effort: Pulmonary effort is normal. No respiratory distress.      Breath sounds: Normal breath sounds.   Abdominal:      General: Abdomen is flat. Bowel sounds are normal. There is no distension.      Palpations: Abdomen is soft. There is no  mass.      Tenderness: There is abdominal tenderness in the epigastric area and left upper quadrant.      Hernia: No hernia is present.     Musculoskeletal:      Right lower leg: No edema.      Left lower leg: No edema.     Skin:     General: Skin is warm and dry.     Neurological:      General: No focal deficit present.      Mental Status: She is alert and oriented to person, place, and time.            VITALS:  Vitals:    07/29/25 2005 07/30/25 0013 07/30/25 0423 07/30/25 0700   BP: 107/70 110/67 94/57 104/67   BP Location: Left arm Left arm Left arm Left arm   Patient Position: Sitting Lying Lying Lying   Pulse: 77   87   Resp: 16   16   Temp: 36.8 °C (98.2 °F) 36.7 °C (98.1 °F) 36.8 °C (98.2 °F) 35.8 °C (96.4 °F)   TempSrc: Temporal Temporal Temporal Temporal   SpO2: 100% 99% 96% 99%   Weight:       Height:             LABS:     CBC:  Results from last 72 hours   Lab Units 07/30/25  0451 07/28/25  0508 07/27/25  1259   WBC AUTO x10*3/uL 8.5 7.8 8.0   RBC AUTO x10*6/uL 3.51* 3.79* 4.04   HEMOGLOBIN g/dL 10.6* 11.5* 12.4   HEMATOCRIT % 30.1* 32.8* 36.2   MCV fL 86 87 90   MCH pg 30.2 30.3 30.7   MCHC g/dL 35.2 35.1 34.3   RDW % 11.7 11.6 11.6   PLATELETS AUTO x10*3/uL 301 319 318     CMP:  Results from last 72 hours   Lab Units 07/30/25  0452 07/28/25  0508 07/27/25  1259   SODIUM mmol/L 136 133* 136   POTASSIUM mmol/L 3.3* 4.6 3.3*   CHLORIDE mmol/L 104 104 101   CO2 mmol/L 24 21 18*   ANION GAP mmol/L 11 13 20   BUN mg/dL 7 5* 11   CREATININE mg/dL 0.68 0.65 0.71   EGFR mL/min/1.73m*2 >90 >90 >90   GLUCOSE mg/dL 63* 163* 63*     Results from last 72 hours   Lab Units 07/30/25  0452 07/28/25  0508 07/27/25  1357 07/27/25  1259   ALBUMIN g/dL 3.8 4.1  --  4.8   ALK PHOS U/L 33 38  --  44   ALT U/L 6* 7  --  9   AST U/L 14 16  --  19   BILIRUBIN TOTAL mg/dL 0.4 0.4  --  0.6   LIPASE U/L  --  149* 106*  --      Calcium/Phos:   Results from last 72 hours   Lab Units 07/30/25  0452 07/28/25  0508 07/27/25  1250    CALCIUM mg/dL 8.5* 8.9 9.2   PHOSPHORUS mg/dL  --   --  3.9      COAG:     CRP:   Lab Results   Component Value Date    CRP 0.58 12/18/2024       ENDO:  Recent Labs     07/27/25  1357 07/10/25  1125 05/16/25  1605 12/18/24  1622   TSH 1.22 1.07 0.34* 0.57      CARDIAC:   Results from last 72 hours   Lab Units 07/27/25  1357 07/27/25  1259   TROPHS ng/L <3 <3       No data recorded      IMAGING:     CT abdomen pelvis w IV contrast  Result Date: 7/27/2025  1.  Limited assessment of stomach due to underdistention, however there is some possible mild distal gastric wall thickening, could be due to gastritis although nonspecific. 2. Incidental 1 cm left hepatic lesion has features which are suggestive of hemangioma. No evidence of underlying liver disease. 3. Mildly prominent dense colonic stool, likely containing previously ingested radiopaque medication (such as Pepto-Bismol). Correlate clinically. No obstruction or definite acute process of the bowel.       Signed by: Maral Art 7/27/2025 4:11 PM Dictation workstation:   [id number]    US right upper quadrant  Result Date: 7/27/2025  Essentially unremarkable exam without definite gallstones.   MACRO: None   Signed by: Usha Wilson 7/27/2025 2:13 PM Dictation workstation:   [id number]    NM GASTRIC EMPTYING SOLID  Result Date: 7/24/2025  IMPRESSION: Normal gastric emptying rate for the solid meal. : MANDA   Transcribe Date/Time: Jul 24 2025  2:24P Dictated by : CAROL MARIN MD This examination was interpreted and the report reviewed and electronically signed by: CAROL MARIN MD on Jul 24 2025  2:24PM  EST    CT abdomen pelvis w IV contrast  Result Date: 7/23/2025  IMPRESSION: No acute process in the abdomen or pelvis. : MANDA   Transcribe Date/Time: Jul 23 2025  3:13P Dictated by : VASHTI WOLFE MD This examination was interpreted and the report reviewed and electronically signed by: VASHTI WOLFE MD on Jul 23 2025  3:17PM   EST    Previous Endoscopy  EGD 5/30   - Impression: Erosive gastropathy with no bleeding and no stigmata of recent bleeding.   Biopsies:  - Stomach, biopsy: Gastric antral and oxyntic mucosa with mild chronic inflammation and reactive gastropathy. No evidence of H. pylori microorganisms on H&E sections.   - Esophagus, distal, biopsy: Reactive squamous mucosa and gastric cardia-type mucosa, negative for intestinal metaplasia      ASSESSMENT & PLAN  Essence Almodovar is a 25 y.o. female with PMHX POTS, lupus, anorexia nervosa, GERD, subclinical hyperthyroidism, who presented to the hospital for intractable nausea.    #Intractable N/V, rule out cyclic vomiting syndrome, could be multifactorial: Functional GI disorder/related to underlying POTS  #GERD  - Continue cyclical vomiting tx w/ amitriptyline, riboflavin, can add Coenzyme Q10 on discharge  - Continue PPI  - Continue anti-emetics and supportive care  - Recommend high-fiber diet, fiber supplements, small and frequent meals. Discussed w/ patient  - Advance diet as tolerated  - Can consider prucalopride for constipation in future  - Follow-up outpatient with GI team at [facility]/  as per patient's preference    Silvia Lunsford DO  IM PGY-2   I saw and evaluated the patient. I personally obtained the key and critical portions of the history and physical exam or was physically present for key and critical portions performed by the resident. I reviewed the resident's documentation and discussed the patient with the resident. I agree with the resident's medical decision making as documented in the note.          [1]   Allergies  Allergen Reactions    Pineapple Anaphylaxis, Unknown and Swelling    Amoxicillin Hives     Patient does not want testing    Azithromycin Diarrhea     Pt reports severe diarrhea and yellowing of her skin with the last course of azithromycin that she took 1/10/2025    Compazine [Prochlorperazine] Hallucinations     Very drowsy as well     [2]   Past Medical  History:  Diagnosis Date    ADHD 09/02/2020    ADHD (attention deficit hyperactivity disorder)     Allergic     Eating disorder     Eczema     Headache     Irregular heart beat 09/02/2020    Scoliosis     Visual impairment    [3]   Past Surgical History:  Procedure Laterality Date    WISDOM TOOTH EXTRACTION     [4]   Family History  Problem Relation Name Age of Onset    No Known Problems Mother      No Known Problems Father     [5]   Social History  Tobacco Use    Smoking status: Never    Smokeless tobacco: Never   Vaping Use    Vaping status: Never Used   Substance Use Topics    Alcohol use: Not Currently     Comment: Occasional / Every other Month.    Drug use: Never   [6] amitriptyline, 25 mg, oral, Nightly  famotidine, 20 mg, intravenous, q12h JESUS  [Held by provider] hydroxychloroquine, 200 mg, oral, Daily  magnesium oxide, 200 mg of magnesium oxide, oral, Nightly  metoprolol succinate XL, 12.5 mg, oral, Daily  pantoprazole, 40 mg, intravenous, BID  riboflavin, 200 mg, oral, BID  sucralfate, 1 g, oral, Before meals & nightly  [7] PRN medications: acetaminophen, melatonin, metoclopramide, ondansetron, sennosides-docusate sodium  [8]

## 2025-07-31 VITALS
TEMPERATURE: 98.4 F | SYSTOLIC BLOOD PRESSURE: 114 MMHG | OXYGEN SATURATION: 100 % | RESPIRATION RATE: 16 BRPM | HEART RATE: 69 BPM | BODY MASS INDEX: 21.64 KG/M2 | DIASTOLIC BLOOD PRESSURE: 76 MMHG | HEIGHT: 60 IN | WEIGHT: 110.2 LBS

## 2025-07-31 LAB
ALBUMIN SERPL BCP-MCNC: 4 G/DL (ref 3.4–5)
ALP SERPL-CCNC: 35 U/L (ref 33–110)
ALT SERPL W P-5'-P-CCNC: 6 U/L (ref 7–45)
ANION GAP SERPL CALC-SCNC: 14 MMOL/L (ref 10–20)
AST SERPL W P-5'-P-CCNC: 14 U/L (ref 9–39)
BASOPHILS # BLD AUTO: 0.04 X10*3/UL (ref 0–0.1)
BASOPHILS NFR BLD AUTO: 0.6 %
BILIRUB SERPL-MCNC: 0.5 MG/DL (ref 0–1.2)
BUN SERPL-MCNC: 7 MG/DL (ref 6–23)
CALCIUM SERPL-MCNC: 8.8 MG/DL (ref 8.6–10.3)
CHLORIDE SERPL-SCNC: 102 MMOL/L (ref 98–107)
CO2 SERPL-SCNC: 24 MMOL/L (ref 21–32)
CREAT SERPL-MCNC: 0.69 MG/DL (ref 0.5–1.05)
EGFRCR SERPLBLD CKD-EPI 2021: >90 ML/MIN/1.73M*2
EOSINOPHIL # BLD AUTO: 0.13 X10*3/UL (ref 0–0.7)
EOSINOPHIL NFR BLD AUTO: 2.1 %
ERYTHROCYTE [DISTWIDTH] IN BLOOD BY AUTOMATED COUNT: 11.6 % (ref 11.5–14.5)
GLUCOSE SERPL-MCNC: 63 MG/DL (ref 74–99)
HCT VFR BLD AUTO: 33.1 % (ref 36–46)
HGB BLD-MCNC: 11.6 G/DL (ref 12–16)
IMM GRANULOCYTES # BLD AUTO: 0.02 X10*3/UL (ref 0–0.7)
IMM GRANULOCYTES NFR BLD AUTO: 0.3 % (ref 0–0.9)
LYMPHOCYTES # BLD AUTO: 2.91 X10*3/UL (ref 1.2–4.8)
LYMPHOCYTES NFR BLD AUTO: 46.3 %
MCH RBC QN AUTO: 30.1 PG (ref 26–34)
MCHC RBC AUTO-ENTMCNC: 35 G/DL (ref 32–36)
MCV RBC AUTO: 86 FL (ref 80–100)
MONOCYTES # BLD AUTO: 0.47 X10*3/UL (ref 0.1–1)
MONOCYTES NFR BLD AUTO: 7.5 %
NEUTROPHILS # BLD AUTO: 2.71 X10*3/UL (ref 1.2–7.7)
NEUTROPHILS NFR BLD AUTO: 43.2 %
NRBC BLD-RTO: 0 /100 WBCS (ref 0–0)
PLATELET # BLD AUTO: 322 X10*3/UL (ref 150–450)
POTASSIUM SERPL-SCNC: 3.4 MMOL/L (ref 3.5–5.3)
PROT SERPL-MCNC: 6.8 G/DL (ref 6.4–8.2)
RBC # BLD AUTO: 3.85 X10*6/UL (ref 4–5.2)
SODIUM SERPL-SCNC: 137 MMOL/L (ref 136–145)
WBC # BLD AUTO: 6.3 X10*3/UL (ref 4.4–11.3)

## 2025-07-31 PROCEDURE — 2500000001 HC RX 250 WO HCPCS SELF ADMINISTERED DRUGS (ALT 637 FOR MEDICARE OP): Performed by: INTERNAL MEDICINE

## 2025-07-31 PROCEDURE — 84075 ASSAY ALKALINE PHOSPHATASE: CPT | Performed by: STUDENT IN AN ORGANIZED HEALTH CARE EDUCATION/TRAINING PROGRAM

## 2025-07-31 PROCEDURE — 2500000004 HC RX 250 GENERAL PHARMACY W/ HCPCS (ALT 636 FOR OP/ED): Performed by: NURSE PRACTITIONER

## 2025-07-31 PROCEDURE — 99239 HOSP IP/OBS DSCHRG MGMT >30: CPT | Performed by: INTERNAL MEDICINE

## 2025-07-31 PROCEDURE — 2500000001 HC RX 250 WO HCPCS SELF ADMINISTERED DRUGS (ALT 637 FOR MEDICARE OP): Performed by: STUDENT IN AN ORGANIZED HEALTH CARE EDUCATION/TRAINING PROGRAM

## 2025-07-31 PROCEDURE — 2500000004 HC RX 250 GENERAL PHARMACY W/ HCPCS (ALT 636 FOR OP/ED): Performed by: STUDENT IN AN ORGANIZED HEALTH CARE EDUCATION/TRAINING PROGRAM

## 2025-07-31 PROCEDURE — 85025 COMPLETE CBC W/AUTO DIFF WBC: CPT | Performed by: STUDENT IN AN ORGANIZED HEALTH CARE EDUCATION/TRAINING PROGRAM

## 2025-07-31 PROCEDURE — 36415 COLL VENOUS BLD VENIPUNCTURE: CPT | Performed by: STUDENT IN AN ORGANIZED HEALTH CARE EDUCATION/TRAINING PROGRAM

## 2025-07-31 PROCEDURE — 2500000004 HC RX 250 GENERAL PHARMACY W/ HCPCS (ALT 636 FOR OP/ED): Performed by: PSYCHIATRY & NEUROLOGY

## 2025-07-31 RX ORDER — THIAMINE HYDROCHLORIDE 100 MG/ML
100 INJECTION, SOLUTION INTRAMUSCULAR; INTRAVENOUS DAILY
Status: DISCONTINUED | OUTPATIENT
Start: 2025-07-31 | End: 2025-07-31 | Stop reason: HOSPADM

## 2025-07-31 RX ORDER — AMITRIPTYLINE HYDROCHLORIDE 25 MG/1
25 TABLET, FILM COATED ORAL NIGHTLY
Qty: 30 TABLET | Refills: 0 | Status: SHIPPED | OUTPATIENT
Start: 2025-07-31 | End: 2025-08-30

## 2025-07-31 RX ORDER — RIBOFLAVIN (VITAMIN B2) 100 MG
200 TABLET ORAL 2 TIMES DAILY
Qty: 120 TABLET | Refills: 0 | Status: SHIPPED | OUTPATIENT
Start: 2025-07-31 | End: 2025-08-30

## 2025-07-31 RX ORDER — ASPIRIN 325 MG
50 TABLET, DELAYED RELEASE (ENTERIC COATED) ORAL DAILY
Qty: 30 CAPSULE | Refills: 0 | Status: SHIPPED | OUTPATIENT
Start: 2025-07-31 | End: 2025-08-30

## 2025-07-31 RX ORDER — SUCRALFATE 1 G/1
1 TABLET ORAL
Qty: 120 TABLET | Refills: 0 | Status: SHIPPED | OUTPATIENT
Start: 2025-07-31 | End: 2025-08-30

## 2025-07-31 RX ADMIN — SUCRALFATE 1 G: 1 TABLET ORAL at 06:16

## 2025-07-31 RX ADMIN — PANTOPRAZOLE SODIUM 40 MG: 40 INJECTION, POWDER, FOR SOLUTION INTRAVENOUS at 09:28

## 2025-07-31 RX ADMIN — ONDANSETRON 4 MG: 2 INJECTION, SOLUTION INTRAMUSCULAR; INTRAVENOUS at 06:57

## 2025-07-31 RX ADMIN — Medication 200 MG: at 10:10

## 2025-07-31 RX ADMIN — METOPROLOL SUCCINATE 12.5 MG: 25 TABLET, EXTENDED RELEASE ORAL at 10:06

## 2025-07-31 RX ADMIN — FAMOTIDINE 20 MG: 10 INJECTION, SOLUTION INTRAVENOUS at 09:28

## 2025-07-31 RX ADMIN — THIAMINE HYDROCHLORIDE 100 MG: 100 INJECTION, SOLUTION INTRAMUSCULAR; INTRAVENOUS at 09:28

## 2025-07-31 ASSESSMENT — COGNITIVE AND FUNCTIONAL STATUS - GENERAL
MOBILITY SCORE: 23
CLIMB 3 TO 5 STEPS WITH RAILING: A LITTLE
DAILY ACTIVITIY SCORE: 24

## 2025-07-31 ASSESSMENT — PAIN SCALES - GENERAL: PAINLEVEL_OUTOF10: 0 - NO PAIN

## 2025-07-31 ASSESSMENT — PAIN - FUNCTIONAL ASSESSMENT: PAIN_FUNCTIONAL_ASSESSMENT: 0-10

## 2025-07-31 NOTE — CARE PLAN
The patient's goals for the shift include to sleep    The clinical goals for the shift include Patient will remain safe.    Over the shift, the patient did make progress toward the following goals.   Problem: Safety - Adult  Goal: Free from fall injury  Outcome: Progressing     Problem: Discharge Planning  Goal: Discharge to home or other facility with appropriate resources  Outcome: Progressing     Problem: Nutrition  Goal: Nutrient intake appropriate for maintaining nutritional needs  Outcome: Progressing     Problem: Chronic Conditions and Co-morbidities  Goal: Patient's chronic conditions and co-morbidity symptoms are monitored and maintained or improved  Outcome: Progressing

## 2025-07-31 NOTE — CARE PLAN
The patient's goals for the shift include relief of nausea.    The clinical goals for the shift include patient will remain safe.    Care plan in progress. Will continue to monitor.

## 2025-07-31 NOTE — DISCHARGE SUMMARY
Discharge Diagnosis  Intractable nausea and vomiting secondary to Plaquenil toxicity  Lupus, holding Plaquenil for now until seen by rheum  POTS           Issues Requiring Follow-Up  Cardiology, rheumatology, pcp      Discharge Meds     Medication List      PAUSE taking these medications     hydroxychloroquine 200 mg tablet; Wait to take this until your doctor or   other care provider tells you to start again.; Until discussed with your   rheumatologist  ; Commonly known as: Plaquenil     START taking these medications     amitriptyline 25 mg tablet; Commonly known as: Elavil; Take 1 tablet (25   mg) by mouth once daily at bedtime.   co-enzyme Q-10 50 mg capsule; Take 1 capsule (50 mg) by mouth once   daily.   riboflavin 100 mg tablet tablet; Commonly known as: vitamin B2; Take 2   tablets (200 mg) by mouth 2 times a day.   sucralfate 1 gram tablet; Commonly known as: Carafate; Take 1 tablet (1   g) by mouth 4 times a day before meals.     CHANGE how you take these medications     cetirizine 10 mg tablet; Commonly known as: ZyrTEC; Take 1 tablet (10   mg) by mouth once daily.; What changed: when to take this, reasons to take   this   fluticasone 50 mcg/actuation nasal spray; Commonly known as: Flonase;   Administer 1 spray into each nostril 2 times a day. Shake gently. Before   first use, prime pump. After use, clean tip and replace cap.; What   changed: when to take this, reasons to take this   Zafemy 150-35 mcg/24 hr; Generic drug: norelgestromin-ethin.estradioL;   Use continuously; What changed: how much to take, how to take this, when   to take this, additional instructions     CONTINUE taking these medications     magnesium glycinate 100 mg tablet   metoclopramide 5 mg tablet; Commonly known as: Reglan; Take 1 tablet (5   mg) by mouth 4 times a day for 5 days.   metoprolol succinate XL 25 mg 24 hr tablet; Commonly known as:   Toprol-XL; Take 1 tablet (25 mg) by mouth once daily. Do not crush or   chew.    ondansetron 4 mg tablet; Commonly known as: Zofran; Take 1 tablet (4 mg)   by mouth every 8 hours if needed for nausea or vomiting.   pantoprazole 40 mg EC tablet; Commonly known as: ProtoNix; Take 1 tablet   (40 mg) by mouth once daily. Do not crush, chew, or split.       Test Results Pending At Discharge  Pending Labs       Order Current Status    Extra Urine Gray Tube Collected (07/27/25 1300)    Urinalysis with Reflex Culture and Microscopic In process            Hospital Course  Patient is a very pleasant 25-year-old female presented to the hospital with complaints of intractable nausea vomiting.  She states she has been dealing with nausea vomiting for about a year now and she has been on Plaquenil for a year.  Was seen by GI they did not feel Plaquenil was contributing to patient's symptoms it was more functional therefore recommended to continue with the recommendations from [facility].  Patient was placed on Carafate amitriptyline on a PPI coenzyme Q at the time of discharge.  But psych strongly felt it was from Plaquenil therefore I did place Plaquenil on hold discussed with the patient since she has gotten better off of it on the last 3 days to please discussed with rheumatologist in regards to the further steps.  Patient also states was taking Plaquenil on an empty stomach and not with food or milk which may have contributed as well.  Patient was stable at the time of discharge.    Time spent more than 30 minutes on discharge    Pertinent Physical Exam At Time of Discharge  Physical Exam  Vitals reviewed.   Constitutional:       Appearance: Normal appearance.   HENT:      Head: Normocephalic.      Right Ear: Tympanic membrane normal.      Nose: Nose normal.      Mouth/Throat:      Mouth: Mucous membranes are moist.     Cardiovascular:      Rate and Rhythm: Normal rate and regular rhythm.   Pulmonary:      Effort: Pulmonary effort is normal.   Abdominal:      General: Abdomen is flat.     Skin:      Capillary Refill: Capillary refill takes less than 2 seconds.     Neurological:      General: No focal deficit present.      Mental Status: She is alert.         Outpatient Follow-Up  Future Appointments   Date Time Provider Department Center   8/5/2025 11:40 AM Leona Peterson MD [id number] [place]   8/13/2025  8:00 AM Adriana Trammell MD [id number] [place]         Alanna Little MD

## 2025-07-31 NOTE — PROGRESS NOTES
07/31/25 0932   Discharge Planning   Home or Post Acute Services None   Expected Discharge Disposition Home     Pt has dc order in. Will dc [facility].

## 2025-08-01 ENCOUNTER — PATIENT OUTREACH (OUTPATIENT)
Dept: PRIMARY CARE | Facility: CLINIC | Age: 25
End: 2025-08-01
Payer: COMMERCIAL

## 2025-08-01 NOTE — PROGRESS NOTES
Discharge Facility:[facility] [place]  Discharge Diagnosis:  Intractable nausea and vomiting secondary to Plaquenil toxicity  Lupus, holding Plaquenil for now until seen by rheum  POTS    Admission Date:7/27/2025  Discharge Date: 7/31/2025    PCP Appointment Date:8/5/2025  Specialist Appointment Date:   8/13/2025 Neuro/[facility]  8/22/2025 Rheum/[facility]  9/2/2025 GI/[place]  Cardio TBD  Hospital Encounter and Summary Linked: Yes  ED to Hosp-Admission (Discharged) with Alanna Little MD; Efrem Henry MD (07/27/2025)   See discharge assessment below for further details  Wrap Up  Wrap Up Additional Comments: -- (Essence states she is doing better, still w/some nausea. She is taking meds as directed and will fu with PCP, Rheum, Cardio) (8/1/2025 12:02 PM)    Engagement  Call Start Time: 1200 (8/1/2025 12:02 PM)    Medications  Medications reviewed with patient/caregiver?: Yes (8/1/2025 12:02 PM)  Is the patient having any side effects they believe may be caused by any medication additions or changes?: No (8/1/2025 12:02 PM)  Does the patient have all medications ordered at discharge?: Yes (8/1/2025 12:02 PM)  Care Management Interventions: No intervention needed (8/1/2025 12:02 PM)  Prescription Comments: -- (Elavil 25 mg q hs, B2 100 mg 2 bid, Carafate 1 grm qid w/meals, Co Q 10 50 mg qd  To HOLD Plaquenil until advisement from Rheumatology) (8/1/2025 12:02 PM)  Is the patient taking all medications as directed (includes completed medication regime)?: Yes (8/1/2025 12:02 PM)  Medication Comments: -- (Essence states she will get all the prescriptions today and begin taking and verbalized understanding.) (8/1/2025 12:02 PM)    Appointments  Does the patient have a primary care provider?: Yes (8/1/2025 12:02 PM)  Care Management Interventions: Verified appointment date/time/provider (8/5/2025 Leona Peterson) (8/1/2025 12:02 PM)  Has the patient kept scheduled appointments due by today?: Yes (8/1/2025 12:02 PM)  Care Management Interventions:  Advised patient to keep appointment (8/1/2025 12:02 PM)    Self Management  What is the home health agency?: -- (na) (8/1/2025 12:02 PM)  What Durable Medical Equipment (DME) was ordered?: -- (na) (8/1/2025 12:02 PM)    Patient Teaching  Does the patient have access to their discharge instructions?: Yes (8/1/2025 12:02 PM)  Care Management Interventions: Reviewed instructions with patient (8/1/2025 12:02 PM)  What is the patient's perception of their health status since discharge?: Improving (8/1/2025 12:02 PM)  Is the patient/caregiver able to teach back the hierarchy of who to call/visit for symptoms/problems? PCP, Specialist, Home Health nurse, Urgent Care, ED, 911: Yes (8/1/2025 12:02 PM)

## 2025-08-05 ENCOUNTER — APPOINTMENT (OUTPATIENT)
Dept: PRIMARY CARE | Facility: CLINIC | Age: 25
End: 2025-08-05
Payer: COMMERCIAL

## 2025-08-05 VITALS
SYSTOLIC BLOOD PRESSURE: 113 MMHG | WEIGHT: 101.6 LBS | TEMPERATURE: 97.3 F | OXYGEN SATURATION: 99 % | BODY MASS INDEX: 19.84 KG/M2 | HEART RATE: 103 BPM | RESPIRATION RATE: 16 BRPM | DIASTOLIC BLOOD PRESSURE: 80 MMHG

## 2025-08-05 DIAGNOSIS — R11.2 INTRACTABLE NAUSEA AND VOMITING: Primary | ICD-10-CM

## 2025-08-05 PROCEDURE — 99213 OFFICE O/P EST LOW 20 MIN: CPT | Performed by: FAMILY MEDICINE

## 2025-08-05 NOTE — PROGRESS NOTES
Subjective   Patient ID: Essence Almodovar is a 25 y.o. female who presents for Follow-up (Return to work).  HPI    Patient presents to the office today to follow-up 2 separate incidences of nausea and vomiting.  Overall she is feeling much better now tolerating current medications that she was prescribed while in the hospital including pantoprazole Zofran and Carafate.  She does have an upcoming appoint with gastroenterology in September.  She was initially seen on July 14 and those notes are reviewed.  She missed work from July 11 through July 16 and was told that she had stomach virus.      She began vomiting again on July 24 and presented to the emergency department and missed work 7/25-8/5/2025.  She was hospitalized from July 25-31.  He was diagnosed with gastritis and a urinary tract infection.  So medication changes were made and she was discharged home.      She currently works at  as a patient liaison      Review of Systems    Objective   Physical Exam  Constitutional:       Appearance: Normal appearance.     Cardiovascular:      Rate and Rhythm: Normal rate and regular rhythm.   Pulmonary:      Effort: Pulmonary effort is normal.      Breath sounds: Normal breath sounds.   Abdominal:      General: Abdomen is flat. Bowel sounds are normal.      Palpations: Abdomen is soft.     Skin:     General: Skin is warm and dry.     Neurological:      Mental Status: She is alert.     Psychiatric:         Mood and Affect: Mood normal.         Behavior: Behavior normal.         Assessment & Plan  Intractable nausea and vomiting  Continue with zofran, carafate, and protonix, keep appt with GI in sept    FMLA and RTW forms completed.       Follow-up as needed

## 2025-08-05 NOTE — ASSESSMENT & PLAN NOTE
Continue with zofran, carafate, and protonix, keep appt with GI in sept    FMLA and RTW forms completed.

## 2025-08-07 ENCOUNTER — PATIENT OUTREACH (OUTPATIENT)
Dept: PRIMARY CARE | Facility: CLINIC | Age: 25
End: 2025-08-07
Payer: COMMERCIAL

## 2025-08-07 NOTE — PROGRESS NOTES
Confirmation of at least 2 patient identifiers.    Completed telephonic follow-up with patient after recent visit with GI, PCP office    Spoke to patient during outreach call.    Patient reports feeling: Improved    Patient has questions or concerns about medications: No    Have all prescribed medications been filled? Yes    Patient has necessary resources to manage their care? Yes    Patient has questions or concerns? No    Next care management follow-up approximately within one month.  Care  information provided to patient.  Essence is doing well, no concerns at this time.

## 2025-08-13 ENCOUNTER — OFFICE VISIT (OUTPATIENT)
Dept: NEUROLOGY | Facility: CLINIC | Age: 25
End: 2025-08-13
Payer: COMMERCIAL

## 2025-08-13 ENCOUNTER — HOSPITAL ENCOUNTER (OUTPATIENT)
Dept: RADIOLOGY | Facility: CLINIC | Age: 25
Discharge: HOME | End: 2025-08-13
Payer: COMMERCIAL

## 2025-08-13 VITALS
SYSTOLIC BLOOD PRESSURE: 105 MMHG | DIASTOLIC BLOOD PRESSURE: 67 MMHG | HEART RATE: 72 BPM | BODY MASS INDEX: 19.73 KG/M2 | WEIGHT: 101 LBS

## 2025-08-13 DIAGNOSIS — M32.9 SYSTEMIC LUPUS ERYTHEMATOSUS, UNSPECIFIED SLE TYPE, UNSPECIFIED ORGAN INVOLVEMENT STATUS (MULTI): ICD-10-CM

## 2025-08-13 DIAGNOSIS — M54.2 CERVICALGIA: ICD-10-CM

## 2025-08-13 DIAGNOSIS — G90.A POTS (POSTURAL ORTHOSTATIC TACHYCARDIA SYNDROME): ICD-10-CM

## 2025-08-13 DIAGNOSIS — G62.9 NEUROPATHY: Primary | ICD-10-CM

## 2025-08-13 DIAGNOSIS — M24.9 HYPERMOBILE JOINTS: ICD-10-CM

## 2025-08-13 PROCEDURE — 99417 PROLNG OP E/M EACH 15 MIN: CPT | Performed by: SPECIALIST

## 2025-08-13 PROCEDURE — 72050 X-RAY EXAM NECK SPINE 4/5VWS: CPT | Performed by: RADIOLOGY

## 2025-08-13 PROCEDURE — 99202 OFFICE O/P NEW SF 15 MIN: CPT | Performed by: SPECIALIST

## 2025-08-13 PROCEDURE — 72050 X-RAY EXAM NECK SPINE 4/5VWS: CPT

## 2025-08-13 PROCEDURE — 99215 OFFICE O/P EST HI 40 MIN: CPT | Performed by: SPECIALIST

## 2025-08-13 ASSESSMENT — ANXIETY QUESTIONNAIRES
6. BECOMING EASILY ANNOYED OR IRRITABLE: NOT AT ALL
4. TROUBLE RELAXING: NOT AT ALL
5. BEING SO RESTLESS THAT IT IS HARD TO SIT STILL: NOT AT ALL
IF YOU CHECKED OFF ANY PROBLEMS ON THIS QUESTIONNAIRE, HOW DIFFICULT HAVE THESE PROBLEMS MADE IT FOR YOU TO DO YOUR WORK, TAKE CARE OF THINGS AT HOME, OR GET ALONG WITH OTHER PEOPLE: NOT DIFFICULT AT ALL
2. NOT BEING ABLE TO STOP OR CONTROL WORRYING: NOT AT ALL
3. WORRYING TOO MUCH ABOUT DIFFERENT THINGS: NOT AT ALL
7. FEELING AFRAID AS IF SOMETHING AWFUL MIGHT HAPPEN: NOT AT ALL
1. FEELING NERVOUS, ANXIOUS, OR ON EDGE: NOT AT ALL
GAD7 TOTAL SCORE: 0

## 2025-08-13 ASSESSMENT — ENCOUNTER SYMPTOMS
OCCASIONAL FEELINGS OF UNSTEADINESS: 0
LOSS OF SENSATION IN FEET: 0
DEPRESSION: 0

## 2025-08-13 ASSESSMENT — PATIENT HEALTH QUESTIONNAIRE - PHQ9
SUM OF ALL RESPONSES TO PHQ9 QUESTIONS 1 AND 2: 0
1. LITTLE INTEREST OR PLEASURE IN DOING THINGS: NOT AT ALL
2. FEELING DOWN, DEPRESSED OR HOPELESS: NOT AT ALL

## 2025-08-19 ENCOUNTER — OFFICE VISIT (OUTPATIENT)
Dept: DERMATOLOGY | Facility: CLINIC | Age: 25
End: 2025-08-19
Payer: COMMERCIAL

## 2025-08-19 DIAGNOSIS — L30.9 DERMATITIS: Primary | ICD-10-CM

## 2025-08-19 DIAGNOSIS — L98.9 DERMATOLOGIC PROBLEM: ICD-10-CM

## 2025-08-19 PROCEDURE — 99204 OFFICE O/P NEW MOD 45 MIN: CPT | Performed by: DERMATOLOGY

## 2025-08-19 PROCEDURE — 11104 PUNCH BX SKIN SINGLE LESION: CPT | Performed by: DERMATOLOGY

## 2025-08-19 PROCEDURE — 1036F TOBACCO NON-USER: CPT | Performed by: DERMATOLOGY

## 2025-08-19 RX ORDER — CLOBETASOL PROPIONATE 0.5 MG/G
CREAM TOPICAL
Qty: 60 G | Refills: 0 | Status: SHIPPED | OUTPATIENT
Start: 2025-08-19

## 2025-08-19 RX ORDER — MOMETASONE FUROATE 1 MG/G
CREAM TOPICAL
Qty: 45 G | Refills: 0 | Status: SHIPPED | OUTPATIENT
Start: 2025-08-19

## 2025-08-21 LAB
LABORATORY COMMENT REPORT: NORMAL
PATH REPORT.FINAL DX SPEC: NORMAL
PATH REPORT.GROSS SPEC: NORMAL
PATH REPORT.MICROSCOPIC SPEC OTHER STN: NORMAL
PATH REPORT.RELEVANT HX SPEC: NORMAL
PATH REPORT.TOTAL CANCER: NORMAL

## 2025-08-21 RX ORDER — TRIAMCINOLONE ACETONIDE 1 MG/G
CREAM TOPICAL
Qty: 45 G | Refills: 0 | Status: SHIPPED | OUTPATIENT
Start: 2025-08-21

## 2025-08-21 RX ORDER — BETAMETHASONE DIPROPIONATE 0.5 MG/G
CREAM TOPICAL
Qty: 45 G | Refills: 1 | Status: SHIPPED | OUTPATIENT
Start: 2025-08-21

## 2025-09-03 ENCOUNTER — APPOINTMENT (OUTPATIENT)
Dept: DERMATOLOGY | Facility: CLINIC | Age: 25
End: 2025-09-03
Payer: COMMERCIAL

## 2025-09-04 ENCOUNTER — SPECIALTY PHARMACY (OUTPATIENT)
Dept: PHARMACY | Facility: CLINIC | Age: 25
End: 2025-09-04